# Patient Record
Sex: MALE | Race: WHITE | NOT HISPANIC OR LATINO | Employment: FULL TIME | URBAN - NONMETROPOLITAN AREA
[De-identification: names, ages, dates, MRNs, and addresses within clinical notes are randomized per-mention and may not be internally consistent; named-entity substitution may affect disease eponyms.]

---

## 2017-03-07 RX ORDER — ASPIRIN 81 MG/1
81 TABLET, CHEWABLE ORAL DAILY
COMMUNITY

## 2017-03-07 RX ORDER — INSULIN GLARGINE 100 [IU]/ML
35 INJECTION, SOLUTION SUBCUTANEOUS DAILY
COMMUNITY
End: 2017-07-24 | Stop reason: SDUPTHER

## 2017-03-07 RX ORDER — MAGNESIUM OXIDE 400 MG/1
400 TABLET ORAL 2 TIMES DAILY
COMMUNITY

## 2017-03-07 RX ORDER — ALLOPURINOL 100 MG/1
100 TABLET ORAL DAILY
Status: ON HOLD | COMMUNITY
End: 2017-03-14

## 2017-03-14 ENCOUNTER — ANESTHESIA EVENT (OUTPATIENT)
Dept: GASTROENTEROLOGY | Facility: HOSPITAL | Age: 57
End: 2017-03-14

## 2017-03-14 ENCOUNTER — ANESTHESIA (OUTPATIENT)
Dept: GASTROENTEROLOGY | Facility: HOSPITAL | Age: 57
End: 2017-03-14

## 2017-03-14 ENCOUNTER — HOSPITAL ENCOUNTER (OUTPATIENT)
Facility: HOSPITAL | Age: 57
Setting detail: HOSPITAL OUTPATIENT SURGERY
Discharge: HOME | End: 2017-03-14
Attending: INTERNAL MEDICINE | Admitting: INTERNAL MEDICINE

## 2017-03-14 VITALS
WEIGHT: 207.89 LBS | DIASTOLIC BLOOD PRESSURE: 65 MMHG | BODY MASS INDEX: 26.68 KG/M2 | OXYGEN SATURATION: 96 % | HEART RATE: 77 BPM | HEIGHT: 74 IN | RESPIRATION RATE: 20 BRPM | SYSTOLIC BLOOD PRESSURE: 105 MMHG | TEMPERATURE: 97.8 F

## 2017-03-14 DIAGNOSIS — K63.5 BENIGN COLON POLYP: ICD-10-CM

## 2017-03-14 LAB — GLUCOSE BLDC GLUCOMTR-MCNC: 193 MG/DL (ref 70–130)

## 2017-03-14 PROCEDURE — 25010000002 MIDAZOLAM PER 1 MG: Performed by: NURSE ANESTHETIST, CERTIFIED REGISTERED

## 2017-03-14 PROCEDURE — 25010000002 PROPOFOL 10 MG/ML EMULSION: Performed by: NURSE ANESTHETIST, CERTIFIED REGISTERED

## 2017-03-14 PROCEDURE — 82962 GLUCOSE BLOOD TEST: CPT

## 2017-03-14 PROCEDURE — 88305 TISSUE EXAM BY PATHOLOGIST: CPT | Performed by: PATHOLOGY

## 2017-03-14 PROCEDURE — 25010000002 FENTANYL CITRATE (PF) 100 MCG/2ML SOLUTION: Performed by: NURSE ANESTHETIST, CERTIFIED REGISTERED

## 2017-03-14 PROCEDURE — 88305 TISSUE EXAM BY PATHOLOGIST: CPT | Performed by: INTERNAL MEDICINE

## 2017-03-14 RX ORDER — MIDAZOLAM HYDROCHLORIDE 1 MG/ML
INJECTION INTRAMUSCULAR; INTRAVENOUS AS NEEDED
Status: DISCONTINUED | OUTPATIENT
Start: 2017-03-14 | End: 2017-03-14 | Stop reason: SURG

## 2017-03-14 RX ORDER — DEXAMETHASONE SODIUM PHOSPHATE 4 MG/ML
8 INJECTION, SOLUTION INTRA-ARTICULAR; INTRALESIONAL; INTRAMUSCULAR; INTRAVENOUS; SOFT TISSUE ONCE AS NEEDED
Status: DISCONTINUED | OUTPATIENT
Start: 2017-03-14 | End: 2017-03-14 | Stop reason: HOSPADM

## 2017-03-14 RX ORDER — SIMVASTATIN 10 MG
10 TABLET ORAL NIGHTLY
COMMUNITY
End: 2022-07-06 | Stop reason: SDUPTHER

## 2017-03-14 RX ORDER — FENTANYL CITRATE 50 UG/ML
INJECTION, SOLUTION INTRAMUSCULAR; INTRAVENOUS AS NEEDED
Status: DISCONTINUED | OUTPATIENT
Start: 2017-03-14 | End: 2017-03-14 | Stop reason: SURG

## 2017-03-14 RX ORDER — PROMETHAZINE HYDROCHLORIDE 25 MG/1
25 TABLET ORAL ONCE AS NEEDED
Status: DISCONTINUED | OUTPATIENT
Start: 2017-03-14 | End: 2017-03-14 | Stop reason: HOSPADM

## 2017-03-14 RX ORDER — PROPOFOL 10 MG/ML
VIAL (ML) INTRAVENOUS AS NEEDED
Status: DISCONTINUED | OUTPATIENT
Start: 2017-03-14 | End: 2017-03-14 | Stop reason: SURG

## 2017-03-14 RX ORDER — PROMETHAZINE HYDROCHLORIDE 25 MG/ML
12.5 INJECTION, SOLUTION INTRAMUSCULAR; INTRAVENOUS ONCE AS NEEDED
Status: DISCONTINUED | OUTPATIENT
Start: 2017-03-14 | End: 2017-03-14 | Stop reason: HOSPADM

## 2017-03-14 RX ORDER — ONDANSETRON 2 MG/ML
4 INJECTION INTRAMUSCULAR; INTRAVENOUS ONCE AS NEEDED
Status: DISCONTINUED | OUTPATIENT
Start: 2017-03-14 | End: 2017-03-14 | Stop reason: HOSPADM

## 2017-03-14 RX ORDER — PROMETHAZINE HYDROCHLORIDE 25 MG/1
25 SUPPOSITORY RECTAL ONCE AS NEEDED
Status: DISCONTINUED | OUTPATIENT
Start: 2017-03-14 | End: 2017-03-14 | Stop reason: HOSPADM

## 2017-03-14 RX ORDER — INSULIN GLARGINE 100 [IU]/ML
40 INJECTION, SOLUTION SUBCUTANEOUS NIGHTLY
COMMUNITY
End: 2017-07-24 | Stop reason: SDUPTHER

## 2017-03-14 RX ORDER — DEXTROSE AND SODIUM CHLORIDE 5; .45 G/100ML; G/100ML
30 INJECTION, SOLUTION INTRAVENOUS CONTINUOUS
Status: DISCONTINUED | OUTPATIENT
Start: 2017-03-14 | End: 2017-03-14 | Stop reason: HOSPADM

## 2017-03-14 RX ADMIN — PROPOFOL 50 MG: 10 INJECTION, EMULSION INTRAVENOUS at 08:55

## 2017-03-14 RX ADMIN — PROPOFOL 100 MG: 10 INJECTION, EMULSION INTRAVENOUS at 09:00

## 2017-03-14 RX ADMIN — PROPOFOL 50 MG: 10 INJECTION, EMULSION INTRAVENOUS at 09:05

## 2017-03-14 RX ADMIN — PROPOFOL 50 MG: 10 INJECTION, EMULSION INTRAVENOUS at 08:52

## 2017-03-14 RX ADMIN — MIDAZOLAM 2 MG: 1 INJECTION INTRAMUSCULAR; INTRAVENOUS at 08:52

## 2017-03-14 RX ADMIN — DEXTROSE AND SODIUM CHLORIDE 30 ML/HR: 5; 450 INJECTION, SOLUTION INTRAVENOUS at 08:40

## 2017-03-14 RX ADMIN — FENTANYL CITRATE 50 MCG: 50 INJECTION, SOLUTION INTRAMUSCULAR; INTRAVENOUS at 08:52

## 2017-03-14 NOTE — ANESTHESIA PREPROCEDURE EVALUATION
Anesthesia Evaluation     NPO Status: > 8 hours   Airway   Mallampati: II  TM distance: >3 FB  no difficulty expected  Dental    (+) edentulous    Pulmonary - normal exam   (+) a smoker Current,   Cardiovascular - normal exam  Exercise tolerance: good (4-7 METS)    (+) dysrhythmias,       Neuro/Psych- negative ROS  GI/Hepatic/Renal/Endo    (+)  diabetes mellitus type 2 poorly controlled,     Musculoskeletal     Abdominal    Substance History      OB/GYN          Other                                  Anesthesia Plan    ASA 3     MAC     Anesthetic plan and risks discussed with patient.    Plan discussed with CRNA.

## 2017-03-14 NOTE — PLAN OF CARE
Problem: Patient Care Overview (Adult)  Goal: Plan of Care Review    03/14/17 0911   Coping/Psychosocial Response Interventions   Plan Of Care Reviewed With patient   Patient Care Overview   Progress no change   Outcome Evaluation   Outcome Summary/Follow up Plan pt alert         Problem: GI Endoscopy (Adult)  Goal: Signs and Symptoms of Listed Potential Problems Will be Absent or Manageable (GI Endoscopy)    03/14/17 0911   GI Endoscopy   Problems Assessed (GI Endoscopy) all   Problems Present (GI Endoscopy) none

## 2017-03-14 NOTE — H&P
Arie Forde DO,[facility]  Gastroenterology  Hepatology  Endoscopy  Board Certified in Internal Medicine and gastroenterology  [address], [address]  [place]. [address]  - [phone]   F - [phone]     GASTROENTEROLOGY HISTORY AND PHYSICAL  NOTE   ARIE FORDE DO.         SUBJECTIVE:   3/14/2017    Name: Sandoval Navarro  DOD: 1960      Chief Complaint:       Subjective : Personal history of colon polyps 5 years ago    Patient is 56 y.o. male presents with desire for surveillance colonoscopy.  Personal history of colon polyps with last colonoscopy 5 years ago.  No subjective or objective symptoms.  No family history of colon cancer.      ROS/HISTORY/ CURRENT MEDICATIONS/OBJECTIVE/VS/PE:   Review of Systems:   Review of Systems    History:     Past Medical History   Diagnosis Date   • Diabetes mellitus    • Gout    • Hyperlipemia      Past Surgical History   Procedure Laterality Date   • Hand surgery     • Nose surgery     • Back surgery     • Cholecystectomy     • Pancreas surgery     • Splenectomy     • Leg surgery Right      History reviewed. No pertinent family history.  Social History   Substance Use Topics   • Smoking status: Current Every Day Smoker     Packs/day: 1.00     Types: Cigarettes   • Smokeless tobacco: None   • Alcohol use Yes     Prescriptions Prior to Admission   Medication Sig Dispense Refill Last Dose   • aspirin 81 MG chewable tablet Chew 81 mg Daily.   3/11/2017   • diltiaZEM (CARDIZEM) 60 MG tablet Take  by mouth 2 (Two) Times a Day. Patient will bring    3/13/2017 at Unknown time   • gabapentin (NEURONTIN) 100 MG capsule Take 100 mg by mouth Daily.   3/13/2017 at Unknown time   • insulin glargine (LANTUS) 100 UNIT/ML injection Inject 35 Units under the skin Daily.   3/13/2017 at Unknown time   • insulin glargine (LANTUS) 100 UNIT/ML injection Inject 40 Units under the skin Every Night.   3/13/2017 at Unknown time   • insulin lispro (humaLOG) 100 UNIT/ML  injection Inject  under the skin 3 (Three) Times a Day Before Meals. Sliding scale   3/13/2017 at Unknown time   • magnesium oxide (MAG-OX) 400 MG tablet Take 400 mg by mouth 2 (Two) Times a Day.   3/13/2017 at Unknown time   • simvastatin (ZOCOR) 10 MG tablet Take 10 mg by mouth Every Night.   3/13/2017 at Unknown time     Allergies:  Review of patient's allergies indicates no known allergies.    I have reviewed the patients medical history, surgical history and family history in the available medical record system.     Current Medications:     Current Facility-Administered Medications   Medication Dose Route Frequency Provider Last Rate Last Dose   • dextrose 5 % and sodium chloride 0.45 % infusion  30 mL/hr Intravenous Continuous Cachorro Perez,  30 mL/hr at 03/14/17 0840 30 mL/hr at 03/14/17 0840       Objective     Physical Exam:   Temp:  [98.6 °F (37 °C)] 98.6 °F (37 °C)  Heart Rate:  [80] 80  Resp:  [18] 18  BP: (127)/(76) 127/76    Physical Exam:  General Appearance:    Alert, cooperative, in no acute distress   Head:    Normocephalic, without obvious abnormality, atraumatic   Eyes:            Lids and lashes normal, conjunctivae and sclerae normal, no   icterus, no pallor, corneas clear, PERRLA   Ears:    Ears appear intact with no abnormalities noted   Throat:   No oral lesions, no thrush, oral mucosa moist   Neck:   No adenopathy, supple, trachea midline, no thyromegaly, no     carotid bruit, no JVD   Back:     No kyphosis present, no scoliosis present, no skin lesions,       erythema or scars, no tenderness to percussion or                   palpation,   range of motion normal   Lungs:     Clear to auscultation,respirations regular, even and                   unlabored    Heart:    Regular rhythm and normal rate, normal S1 and S2, no            murmur, no gallop, no rub, no click   Breast Exam:    Deferred   Abdomen:     Normal bowel sounds, no masses, no organomegaly, soft        non-tender,  non-distended, no guarding, no rebound                 tenderness   Genitalia:    Deferred   Extremities:   Moves all extremities well, no edema, no cyanosis, no              redness   Pulses:   Pulses palpable and equal bilaterally   Skin:   No bleeding, bruising or rash   Lymph nodes:   No palpable adenopathy   Neurologic:   Cranial nerves 2 - 12 grossly intact, sensation intact, DTR        present and equal bilaterally      Results Review:     Lab Results   Component Value Date    WBC 10.4 (H) 07/02/2016    WBC 13.6 (H) 07/01/2016    WBC 15.5 (H) 09/19/2014    HGB 13.6 (L) 07/02/2016    HGB 14.5 07/01/2016    HGB 14.0 09/19/2014    HCT 39.4 07/02/2016    HCT 41.7 07/01/2016    HCT 41.2 09/19/2014     07/02/2016     07/01/2016     09/19/2014             No results found for: LIPASE  Lab Results   Component Value Date    INR 0.9 07/02/2016    INR 1.0 09/19/2014          Radiology Review:  Imaging Results (last 72 hours)     ** No results found for the last 72 hours. **           I reviewed the patient's new clinical results.  I reviewed the patient's new imaging results and agree with the interpretation.     ASSESSMENT/PLAN:   ASSESSMENT:   1.  Personal history of colon polyps    PLAN:   1.  Colonoscopy, surveillance    Risk and benefits associated with the procedure are reviewed with the patient.  He wishes to proceed      Cachorro Perez DO  03/14/17  8:47 AM

## 2017-03-14 NOTE — PLAN OF CARE
Problem: Patient Care Overview (Adult)  Goal: Plan of Care Review  Outcome: Outcome(s) achieved Date Met:  03/14/17 03/14/17 0941   Coping/Psychosocial Response Interventions   Plan Of Care Reviewed With patient   Patient Care Overview   Progress no change   Outcome Evaluation   Outcome Summary/Follow up Plan ready for d/c         Problem: GI Endoscopy (Adult)  Goal: Signs and Symptoms of Listed Potential Problems Will be Absent or Manageable (GI Endoscopy)  Outcome: Outcome(s) achieved Date Met:  03/14/17 03/14/17 0941   GI Endoscopy   Problems Assessed (GI Endoscopy) all   Problems Present (GI Endoscopy) none

## 2017-03-14 NOTE — ANESTHESIA POSTPROCEDURE EVALUATION
Patient: Sandoval Navarro    Procedure Summary     Date Anesthesia Start Anesthesia Stop Room / Location    03/14/17 0851 0910 [facility] ENDOSCOPY 2 / [facility] ENDOSCOPY       Procedure Diagnosis Surgeon Provider    COLONOSCOPY (N/A ) Benign colon polyp  (BENIGN COLON POLYPS) DO Chelsey Cox CRNA          Anesthesia Type: MAC  Last vitals  /76 (03/14/17 0823)    Temp 98.6 °F (37 °C) (03/14/17 0823)    Pulse 80 (03/14/17 0823)   Resp 18 (03/14/17 0823)    SpO2 98 % (03/14/17 0823)      Post Anesthesia Care and Evaluation    Patient location during evaluation: bedside  Patient participation: complete - patient participated  Level of consciousness: awake  Pain score: 0  Pain management: adequate  Airway patency: patent  Anesthetic complications: No anesthetic complications  PONV Status: none  Cardiovascular status: acceptable and stable  Respiratory status: acceptable and spontaneous ventilation  Hydration status: acceptable

## 2017-03-15 LAB
LAB AP CASE REPORT: NORMAL
Lab: NORMAL
PATH REPORT.FINAL DX SPEC: NORMAL
PATH REPORT.GROSS SPEC: NORMAL

## 2017-04-24 ENCOUNTER — LAB (OUTPATIENT)
Dept: ONCOLOGY | Facility: HOSPITAL | Age: 57
End: 2017-04-24

## 2017-04-24 ENCOUNTER — CONSULT (OUTPATIENT)
Dept: ONCOLOGY | Facility: CLINIC | Age: 57
End: 2017-04-24

## 2017-04-24 VITALS
TEMPERATURE: 98.9 F | HEIGHT: 74 IN | WEIGHT: 211.9 LBS | HEART RATE: 71 BPM | SYSTOLIC BLOOD PRESSURE: 132 MMHG | BODY MASS INDEX: 27.2 KG/M2 | DIASTOLIC BLOOD PRESSURE: 74 MMHG

## 2017-04-24 DIAGNOSIS — D72.829 LEUKOCYTOSIS, UNSPECIFIED TYPE: Primary | ICD-10-CM

## 2017-04-24 DIAGNOSIS — D72.829 LEUKOCYTOSIS, UNSPECIFIED TYPE: ICD-10-CM

## 2017-04-24 LAB
ALBUMIN SERPL-MCNC: 5 G/DL (ref 3.4–4.8)
ALBUMIN/GLOB SERPL: 1.5 G/DL (ref 1.1–1.8)
ALP SERPL-CCNC: 95 U/L (ref 38–126)
ALT SERPL W P-5'-P-CCNC: 35 U/L (ref 21–72)
ANION GAP SERPL CALCULATED.3IONS-SCNC: 13 MMOL/L (ref 5–15)
AST SERPL-CCNC: 28 U/L (ref 17–59)
BASOPHILS # BLD AUTO: 0.04 10*3/MM3 (ref 0–0.2)
BASOPHILS NFR BLD AUTO: 0.4 % (ref 0–2)
BILIRUB SERPL-MCNC: 0.5 MG/DL (ref 0.2–1.3)
BUN BLD-MCNC: 12 MG/DL (ref 7–21)
BUN/CREAT SERPL: 15.2 (ref 7–25)
CALCIUM SPEC-SCNC: 9.8 MG/DL (ref 8.4–10.2)
CHLORIDE SERPL-SCNC: 98 MMOL/L (ref 95–110)
CO2 SERPL-SCNC: 27 MMOL/L (ref 22–31)
CREAT BLD-MCNC: 0.79 MG/DL (ref 0.7–1.3)
DEPRECATED RDW RBC AUTO: 41.1 FL (ref 35.1–43.9)
EOSINOPHIL # BLD AUTO: 0.23 10*3/MM3 (ref 0–0.7)
EOSINOPHIL NFR BLD AUTO: 2.2 % (ref 0–7)
ERYTHROCYTE [DISTWIDTH] IN BLOOD BY AUTOMATED COUNT: 13 % (ref 11.5–14.5)
GFR SERPL CREATININE-BSD FRML MDRD: 101 ML/MIN/1.73 (ref 56–130)
GLOBULIN UR ELPH-MCNC: 3.4 GM/DL (ref 2.3–3.5)
GLUCOSE BLD-MCNC: 178 MG/DL (ref 60–100)
HCT VFR BLD AUTO: 43.7 % (ref 39–49)
HGB BLD-MCNC: 14.7 G/DL (ref 13.7–17.3)
IMM GRANULOCYTES # BLD: 0.02 10*3/MM3 (ref 0–0.02)
IMM GRANULOCYTES NFR BLD: 0.2 % (ref 0–0.5)
LDH SERPL-CCNC: 534 U/L (ref 313–618)
LYMPHOCYTES # BLD AUTO: 4.48 10*3/MM3 (ref 0.6–4.2)
LYMPHOCYTES NFR BLD AUTO: 43.8 % (ref 10–50)
MCH RBC QN AUTO: 28.9 PG (ref 26.5–34)
MCHC RBC AUTO-ENTMCNC: 33.6 G/DL (ref 31.5–36.3)
MCV RBC AUTO: 85.9 FL (ref 80–98)
MONOCYTES # BLD AUTO: 0.65 10*3/MM3 (ref 0–0.9)
MONOCYTES NFR BLD AUTO: 6.3 % (ref 0–12)
NEUTROPHILS # BLD AUTO: 4.82 10*3/MM3 (ref 2–8.6)
NEUTROPHILS NFR BLD AUTO: 47.1 % (ref 37–80)
PLATELET # BLD AUTO: 258 10*3/MM3 (ref 150–450)
PMV BLD AUTO: 10.4 FL (ref 8–12)
POTASSIUM BLD-SCNC: 4.2 MMOL/L (ref 3.5–5.1)
PROT SERPL-MCNC: 8.4 G/DL (ref 6.3–8.6)
RBC # BLD AUTO: 5.09 10*6/MM3 (ref 4.37–5.74)
SODIUM BLD-SCNC: 138 MMOL/L (ref 137–145)
WBC NRBC COR # BLD: 10.24 10*3/MM3 (ref 3.2–9.8)

## 2017-04-24 PROCEDURE — 99203 OFFICE O/P NEW LOW 30 MIN: CPT | Performed by: INTERNAL MEDICINE

## 2017-04-24 PROCEDURE — 36415 COLL VENOUS BLD VENIPUNCTURE: CPT | Performed by: INTERNAL MEDICINE

## 2017-04-24 PROCEDURE — G0463 HOSPITAL OUTPT CLINIC VISIT: HCPCS | Performed by: INTERNAL MEDICINE

## 2017-04-24 PROCEDURE — 83615 LACTATE (LD) (LDH) ENZYME: CPT | Performed by: INTERNAL MEDICINE

## 2017-04-24 PROCEDURE — 80053 COMPREHEN METABOLIC PANEL: CPT | Performed by: INTERNAL MEDICINE

## 2017-04-24 PROCEDURE — 99406 BEHAV CHNG SMOKING 3-10 MIN: CPT | Performed by: INTERNAL MEDICINE

## 2017-04-24 PROCEDURE — 85025 COMPLETE CBC W/AUTO DIFF WBC: CPT | Performed by: INTERNAL MEDICINE

## 2017-04-24 NOTE — PROGRESS NOTES
DATE OF CONSULT: 4/24/2017    REQUESTING SOURCE:  Guanako Atkins MD      REASON FOR CONSULTATION:  Leukocytosis       HISTORY OF PRESENT ILLNESS:   56-year-old male with a past medical history significant for dyslipidemia, gout, type 2 diabetes mellitus history of splenectomy is being referred to [facility] for evaluation of leukocytosis.  Patient states he has known about elevated white blood cell count for last 1 year.  Denies any fevers or chills.  Complains of chronic night sweats.  Denies any recent weight loss.  Denies any abnormal swollen and anywhere in the body.    PAST MEDICAL HISTORY:    Past Medical History:   Diagnosis Date   • Diabetes mellitus    • Gout    • Hyperlipemia        PAST SURGICAL HISTORY:  Past Surgical History:   Procedure Laterality Date   • BACK SURGERY     • CHOLECYSTECTOMY     • COLONOSCOPY N/A 3/14/2017    Procedure: COLONOSCOPY;  Surgeon: Cachorro Perez DO;  Location: [facility] ENDOSCOPY;  Service:    • HAND SURGERY     • LEG SURGERY Right    • NOSE SURGERY     • PANCREAS SURGERY     • SPLENECTOMY         ALLERGIES:    No Known Allergies    SOCIAL HISTORY:   Social History   Substance Use Topics   • Smoking status: Current Every Day Smoker     Packs/day: 1.00     Types: Cigarettes   • Smokeless tobacco: None   • Alcohol use Yes       CURRENT MEDICATIONS:    Current Outpatient Prescriptions   Medication Sig Dispense Refill   • aspirin 81 MG chewable tablet Chew 81 mg Daily.     • diltiaZEM (CARDIZEM) 60 MG tablet Take  by mouth 2 (Two) Times a Day. Patient will bring      • gabapentin (NEURONTIN) 100 MG capsule Take 100 mg by mouth Daily.     • insulin glargine (LANTUS) 100 UNIT/ML injection Inject 35 Units under the skin Daily.     • insulin glargine (LANTUS) 100 UNIT/ML injection Inject 40 Units under the skin Every Night.     • insulin lispro (humaLOG) 100 UNIT/ML injection Inject  under the skin 3 (Three) Times a Day Before Meals. Sliding scale     • magnesium oxide  (MAG-OX) 400 MG tablet Take 400 mg by mouth 2 (Two) Times a Day.     • simvastatin (ZOCOR) 10 MG tablet Take 10 mg by mouth Every Night.       No current facility-administered medications for this visit.             FAMILY HISTORY:    Patient states his mother and father  in their 80s, denies any family history of cancer or blood disorder or blood clot.          REVIEW OF SYSTEMS:      CONSTITUTIONAL:  Positive for chronic night sweats.  Denies any fever, chills or weight loss.     HEENT:  No epistaxis, mouth sores or difficulty swallowing.    RESPIRATORY:  No new shortness of breath. No new cough or hemoptysis.    CARDIOVASCULAR:  No chest pain or palpitations.    GASTROINTESTINAL:  No abdominal pain nausea, vomiting or blood in the stool.    GENITOURINARY: No Dysuria or Hematuria.    MUSCULOSKELETAL:  Complains of chronic back pain.    LYMPHATICS:  Denies any abnormal swollen glands anywhere in the body.    NEUROLOGICAL : No tingling or numbness. No headache or dizziness. No seizures or balance problems.    SKIN: No new skin lesions.          PHYSICAL EXAMINATION:      VITAL SIGNS:  Temp:  [98.9 °F (37.2 °C)] 98.9 °F (37.2 °C)  Heart Rate:  [71] 71  BP: (132)/(74) 132/74    GENERAL:  Not in any distress.    HEENT:  Normocephalic, Atraumatic.Eyes  Shows mild pallor. No icterus. Extraocular Movements Intact. No Facial Asymmetry noted.    NECK:  No adenopathy. NO JVD.    RESPIRATORY:  Fair air entry bilateral. No rhonchi or wheezing.    CARDIOVASCULAR:  S1, S2. Regular rate and rhythm. No murmur or gallop appreciated.    ABDOMEN:  Soft,  nontender. Bowel sounds present in all four quadrants.  No organomegaly appreciated.  Well-healed surgical scar present.    EXTREMITIES:  No edema.No Calf Tenderness.    NEUROLOGIC:  Alert, awake and oriented ×3.  No  Motor or sensory deficit appreciated. Cranial Nerves 2-12 grossly intact.        DIAGNOSTIC DATA:    WBC   Date Value Ref Range Status   2016 10.4 (H) 3.2 -  9.8 x1000/uL Final     RBC   Date Value Ref Range Status   07/02/2016 4.66 4.37 - 5.74 chace/mm3 Final     Hemoglobin   Date Value Ref Range Status   07/02/2016 13.6 (L) 13.7 - 17.3 gm/dl Final     Hematocrit   Date Value Ref Range Status   07/02/2016 39.4 39.0 - 49.0 % Final     MCV   Date Value Ref Range Status   07/02/2016 84.5 80.0 - 98.0 fl Final     MCH   Date Value Ref Range Status   07/02/2016 29.2 26.0 - 34.0 pg Final     MCHC   Date Value Ref Range Status   07/02/2016 34.5 31.5 - 36.3 gm/dl Final     RDW   Date Value Ref Range Status   07/02/2016 12.8 11.5 - 14.5 % Final     MPV   Date Value Ref Range Status   07/02/2016 11.4 8.0 - 12.0 fl Final     Platelets   Date Value Ref Range Status   07/02/2016 236 150 - 450 x1000/mm3 Final     Neutrophil Rel %   Date Value Ref Range Status   07/02/2016 44.1 37.0 - 80.0 % Final     Lymphocyte Rel %   Date Value Ref Range Status   07/02/2016 43.1 10.0 - 50.0 % Final     Monocyte Rel %   Date Value Ref Range Status   07/02/2016 8.5 0.0 - 12.0 % Final     Eosinophil Rel %   Date Value Ref Range Status   07/02/2016 3.0 0.0 - 7.0 % Final     Basophil Rel %   Date Value Ref Range Status   07/02/2016 0.9 0.0 - 2.0 % Final     Immature Granulocyte Rel %   Date Value Ref Range Status   07/02/2016 0.40 0.00 - 0.50 % Final     Neutrophils Absolute   Date Value Ref Range Status   07/02/2016 4.60 2.00 - 8.60 x1000/uL Final     Lymphocytes Absolute   Date Value Ref Range Status   07/02/2016 4.49 (H) 0.60 - 4.20 x1000/uL Final     Monocytes Absolute   Date Value Ref Range Status   07/02/2016 0.89 0.00 - 0.90 x1000/uL Final     Eosinophils Absolute   Date Value Ref Range Status   07/02/2016 0.31 0.00 - 0.70 x1000/uL Final     Basophils Absolute   Date Value Ref Range Status   07/02/2016 0.09 0.00 - 0.20 x1000/uL Final     Immature Granulocytes Absolute   Date Value Ref Range Status   07/02/2016 0.040 (H) 0.005 - 0.022 x1000/uL Final     Glucose   Date Value Ref Range Status    07/02/2016 260 (H) 60 - 100 mg/dl Final     Sodium   Date Value Ref Range Status   07/02/2016 137 137 - 145 mmol/L Final     Potassium   Date Value Ref Range Status   07/02/2016 4.1 3.5 - 5.1 mmol/L Final     CO2   Date Value Ref Range Status   07/02/2016 25 22 - 31 mmol/L Final     Chloride   Date Value Ref Range Status   07/02/2016 100 95 - 110 mmol/L Final     Anion Gap   Date Value Ref Range Status   07/02/2016 12.0 5.0 - 15.0 mmol/L Final     Creatinine   Date Value Ref Range Status   07/02/2016 0.7 0.7 - 1.3 mg/dl Final     BUN   Date Value Ref Range Status   07/02/2016 16 7 - 21 mg/dl Final     Calcium   Date Value Ref Range Status   07/02/2016 8.8 8.4 - 10.2 mg/dl Final     Alkaline Phosphatase   Date Value Ref Range Status   07/02/2016 61 38 - 126 U/L Final     Total Protein   Date Value Ref Range Status   07/02/2016 6.3 6.3 - 8.6 gm/dl Final     ALT (SGPT)   Date Value Ref Range Status   07/02/2016 28 21 - 72 U/L Final     AST (SGOT)   Date Value Ref Range Status   07/02/2016 15 (L) 17 - 59 U/L Final     Total Bilirubin   Date Value Ref Range Status   07/02/2016 0.6 0.2 - 1.3 mg/dl Final     Albumin   Date Value Ref Range Status   07/02/2016 3.5 3.4 - 4.8 gm/dl Final         ASSESSMENT AND PLAN:      1.  Chronic leukocytosis: Differential diagnosis at this point includes secondary to splenectomy versus reactive causes like inflammation.  Unlikely bone marrow pathology like myeloproliferative neoplasm or lymphoma.  At this point we will monitored with CBC.  We will do CBC with differential along with CMP and LDH today.  If CBC shows very elevated white blood cell count then we will send out peripheral blood flow cytometry as well as JAK2 mutation which was discussed with patient.  Patient does have a chronic nicotine addiction and he smokes about pack per day for last 42 years.  It was explained to patient smoking can also cause elevation in white blood cell count.  He was counseled about the importance  of smoking cessation today.    2.  Diabetes mellitus    3.  Dyslipidemia    4.  Health maintenance: Patient unfortunately continues to smoke about pack per day, he was counseled about smoking cessation.  About 3 minutes were spent for smoking cessation counseling today.  He had a colonoscopy done recently which shows tubular adenoma.  He remains full code.      Thank you for this consultation.        Marcio Ortiz MD  4/24/2017  1:56 PM          EMR Dragon/Transcription disclaimer:   Much of this encounter note is an electronic transcription/translation of spoken language to printed text. The electronic translation of spoken language may permit erroneous, or at times, nonsensical words or phrases to be inadvertently transcribed; Although I have reviewed the note for such errors, some may still exist.

## 2017-07-24 ENCOUNTER — LAB (OUTPATIENT)
Dept: ONCOLOGY | Facility: HOSPITAL | Age: 57
End: 2017-07-24

## 2017-07-24 ENCOUNTER — OFFICE VISIT (OUTPATIENT)
Dept: ONCOLOGY | Facility: CLINIC | Age: 57
End: 2017-07-24

## 2017-07-24 VITALS
BODY MASS INDEX: 28 KG/M2 | SYSTOLIC BLOOD PRESSURE: 137 MMHG | WEIGHT: 218.1 LBS | TEMPERATURE: 98.1 F | DIASTOLIC BLOOD PRESSURE: 81 MMHG | HEART RATE: 97 BPM

## 2017-07-24 DIAGNOSIS — D72.829 LEUKOCYTOSIS, UNSPECIFIED TYPE: Primary | ICD-10-CM

## 2017-07-24 DIAGNOSIS — D72.829 LEUKOCYTOSIS, UNSPECIFIED TYPE: ICD-10-CM

## 2017-07-24 LAB
ALBUMIN SERPL-MCNC: 4.5 G/DL (ref 3.4–4.8)
ALBUMIN/GLOB SERPL: 1.3 G/DL (ref 1.1–1.8)
ALP SERPL-CCNC: 90 U/L (ref 38–126)
ALT SERPL W P-5'-P-CCNC: 38 U/L (ref 21–72)
ANION GAP SERPL CALCULATED.3IONS-SCNC: 11 MMOL/L (ref 5–15)
AST SERPL-CCNC: 24 U/L (ref 17–59)
BASOPHILS # BLD AUTO: 0.04 10*3/MM3 (ref 0–0.2)
BASOPHILS NFR BLD AUTO: 0.3 % (ref 0–2)
BILIRUB SERPL-MCNC: 0.5 MG/DL (ref 0.2–1.3)
BUN BLD-MCNC: 19 MG/DL (ref 7–21)
BUN/CREAT SERPL: 20.4 (ref 7–25)
CALCIUM SPEC-SCNC: 9.4 MG/DL (ref 8.4–10.2)
CHLORIDE SERPL-SCNC: 100 MMOL/L (ref 95–110)
CO2 SERPL-SCNC: 24 MMOL/L (ref 22–31)
CREAT BLD-MCNC: 0.93 MG/DL (ref 0.7–1.3)
DEPRECATED RDW RBC AUTO: 39.7 FL (ref 35.1–43.9)
EOSINOPHIL # BLD AUTO: 0.17 10*3/MM3 (ref 0–0.7)
EOSINOPHIL NFR BLD AUTO: 1.2 % (ref 0–7)
ERYTHROCYTE [DISTWIDTH] IN BLOOD BY AUTOMATED COUNT: 13 % (ref 11.5–14.5)
GFR SERPL CREATININE-BSD FRML MDRD: 83 ML/MIN/1.73 (ref 60–130)
GLOBULIN UR ELPH-MCNC: 3.5 GM/DL (ref 2.3–3.5)
GLUCOSE BLD-MCNC: 96 MG/DL (ref 60–100)
HCT VFR BLD AUTO: 44 % (ref 39–49)
HGB BLD-MCNC: 15.2 G/DL (ref 13.7–17.3)
IMM GRANULOCYTES # BLD: 0.03 10*3/MM3 (ref 0–0.02)
IMM GRANULOCYTES NFR BLD: 0.2 % (ref 0–0.5)
LDH SERPL-CCNC: 478 U/L (ref 313–618)
LYMPHOCYTES # BLD AUTO: 4.16 10*3/MM3 (ref 0.6–4.2)
LYMPHOCYTES NFR BLD AUTO: 28.7 % (ref 10–50)
MCH RBC QN AUTO: 29.3 PG (ref 26.5–34)
MCHC RBC AUTO-ENTMCNC: 34.5 G/DL (ref 31.5–36.3)
MCV RBC AUTO: 84.9 FL (ref 80–98)
MONOCYTES # BLD AUTO: 1.08 10*3/MM3 (ref 0–0.9)
MONOCYTES NFR BLD AUTO: 7.4 % (ref 0–12)
NEUTROPHILS # BLD AUTO: 9.04 10*3/MM3 (ref 2–8.6)
NEUTROPHILS NFR BLD AUTO: 62.2 % (ref 37–80)
PLATELET # BLD AUTO: 240 10*3/MM3 (ref 150–450)
PMV BLD AUTO: 10.9 FL (ref 8–12)
POTASSIUM BLD-SCNC: 4 MMOL/L (ref 3.5–5.1)
PROT SERPL-MCNC: 8 G/DL (ref 6.3–8.6)
RBC # BLD AUTO: 5.18 10*6/MM3 (ref 4.37–5.74)
SODIUM BLD-SCNC: 135 MMOL/L (ref 137–145)
WBC NRBC COR # BLD: 14.52 10*3/MM3 (ref 3.2–9.8)

## 2017-07-24 PROCEDURE — 80053 COMPREHEN METABOLIC PANEL: CPT

## 2017-07-24 PROCEDURE — G0463 HOSPITAL OUTPT CLINIC VISIT: HCPCS | Performed by: INTERNAL MEDICINE

## 2017-07-24 PROCEDURE — 85025 COMPLETE CBC W/AUTO DIFF WBC: CPT

## 2017-07-24 PROCEDURE — 83615 LACTATE (LD) (LDH) ENZYME: CPT

## 2017-07-24 PROCEDURE — 99214 OFFICE O/P EST MOD 30 MIN: CPT | Performed by: INTERNAL MEDICINE

## 2017-07-24 RX ORDER — INSULIN GLARGINE 100 [IU]/ML
53 INJECTION, SOLUTION SUBCUTANEOUS DAILY
Refills: 5 | Status: ON HOLD | COMMUNITY
Start: 2017-06-14 | End: 2023-04-05 | Stop reason: SDUPTHER

## 2017-07-24 RX ORDER — NICOTINE 21 MG/24HR
1 PATCH, TRANSDERMAL 24 HOURS TRANSDERMAL EVERY 24 HOURS
Qty: 15 PATCH | Refills: 2 | Status: SHIPPED | OUTPATIENT
Start: 2017-07-24 | End: 2022-07-06

## 2017-07-24 NOTE — PROGRESS NOTES
DATE OF VISIT: 7/24/2017    REASON FOR VISIT: leukocytosis    HISTORY OF PRESENT ILLNESS:    56-year-old male with a past medical history significant for dyslipidemia, gout, type 2 diabetes mellitus history of splenectomy was seen initially in consultation on April 24, 2017 is here for follow-up visit today.  Denies any abnormal swollen and anywhere in the body.  Denies any fever or chills or night sweats.  Denies any blood in the stool or urine.      PAST MEDICAL HISTORY:    Past Medical History:   Diagnosis Date   • Diabetes mellitus    • Gout    • Hyperlipemia        SOCIAL HISTORY:    Social History   Substance Use Topics   • Smoking status: Current Every Day Smoker     Packs/day: 1.00     Types: Cigarettes   • Smokeless tobacco: None   • Alcohol use Yes       Surgical History :  Past Surgical History:   Procedure Laterality Date   • BACK SURGERY     • CHOLECYSTECTOMY     • COLONOSCOPY N/A 3/14/2017    Procedure: COLONOSCOPY;  Surgeon: Cachorro Perez DO;  Location: [facility] ENDOSCOPY;  Service:    • HAND SURGERY     • LEG SURGERY Right    • NOSE SURGERY     • PANCREAS SURGERY     • SPLENECTOMY         ALLERGIES:    No Known Allergies    REVIEW OF SYSTEMS:      CONSTITUTIONAL:  No fever, chills, or night sweats.     HEENT:  No epistaxis, mouth sores, or difficulty swallowing.    RESPIRATORY:  No new shortness of breath or cough at present.    CARDIOVASCULAR:  No chest pain or palpitations.    GASTROINTESTINAL:  No abdominal pain, nausea, vomiting, or blood in the stool.    GENITOURINARY:  No dysuria or hematuria.    MUSCULOSKELETAL:  positive for chronic back pain. Complains of nodularity on left forearm.    NEUROLOGICAL:  No tingling or numbness. No new headache or dizziness.     LYMPHATICS:  Denies any abnormal swollen and anywhere in the body.    SKIN:  Denies any new skin rash.        PHYSICAL EXAMINATION:      VITAL SIGNS:  /81  Pulse 97  Temp 98.1 °F (36.7 °C)  Wt 218 lb 1.6 oz (98.9 kg)  BMI 28  kg/m2    GENERAL:  Not in any distress.    HEENT:  Normocephalic, Atraumatic.Mild Conjunctival pallor. No icterus. Extraocular Movements Intact. No Facial Asymmetry noted.    NECK:  No adenopathy. No JVD.    RESPIRATORY:  Fair air entry bilateral. No rhonchi or wheezing.    CARDIOVASCULAR:  S1, S2. Regular rate and rhythm. No murmur or gallop appreciated.    ABDOMEN:  Soft, obese, nontender. Bowel sounds present in all four quadrants.  No organomegaly appreciated.    EXTREMITIES:  No edema.No Calf Tenderness.positive for nodularity on left forearm.    NEUROLOGIC:  Alert, awake and oriented ×3.  No  Motor or sensory deficit appreciated. Cranial Nerves 2-12 grossly intact.        DIAGNOSTIC DATA:    Glucose   Date Value Ref Range Status   07/24/2017 96 60 - 100 mg/dL Final     Sodium   Date Value Ref Range Status   07/24/2017 135 (L) 137 - 145 mmol/L Final     Potassium   Date Value Ref Range Status   07/24/2017 4.0 3.5 - 5.1 mmol/L Final     CO2   Date Value Ref Range Status   07/24/2017 24.0 22.0 - 31.0 mmol/L Final     Chloride   Date Value Ref Range Status   07/24/2017 100 95 - 110 mmol/L Final     Anion Gap   Date Value Ref Range Status   07/24/2017 11.0 5.0 - 15.0 mmol/L Final     Creatinine   Date Value Ref Range Status   07/24/2017 0.93 0.70 - 1.30 mg/dL Final     BUN   Date Value Ref Range Status   07/24/2017 19 7 - 21 mg/dL Final     BUN/Creatinine Ratio   Date Value Ref Range Status   07/24/2017 20.4 7.0 - 25.0 Final     Calcium   Date Value Ref Range Status   07/24/2017 9.4 8.4 - 10.2 mg/dL Final     eGFR Non  Amer   Date Value Ref Range Status   07/24/2017 83 >60 mL/min/1.73 Final     Alkaline Phosphatase   Date Value Ref Range Status   07/24/2017 90 38 - 126 U/L Final     Total Protein   Date Value Ref Range Status   07/24/2017 8.0 6.3 - 8.6 g/dL Final     ALT (SGPT)   Date Value Ref Range Status   07/24/2017 38 21 - 72 U/L Final     AST (SGOT)   Date Value Ref Range Status   07/24/2017 24 17 - 59  U/L Final     Total Bilirubin   Date Value Ref Range Status   07/24/2017 0.5 0.2 - 1.3 mg/dL Final     Albumin   Date Value Ref Range Status   07/24/2017 4.50 3.40 - 4.80 g/dL Final     Globulin   Date Value Ref Range Status   07/24/2017 3.5 2.3 - 3.5 gm/dL Final     A/G Ratio   Date Value Ref Range Status   07/24/2017 1.3 1.1 - 1.8 g/dL Final     Lab Results   Component Value Date    WBC 14.52 (H) 07/24/2017    HGB 15.2 07/24/2017    HCT 44.0 07/24/2017    MCV 84.9 07/24/2017     07/24/2017     Lab Results   Component Value Date    NEUTROABS 9.04 (H) 07/24/2017         ASSESSMENT AND PLAN:      1.  Chronic leukocytosis: Differential diagnosis at this point includes secondary to splenectomy versus reactive causes like inflammation.  Unlikely bone marrow pathology like myeloproliferative neoplasm or lymphoma.  At this point we will monitored with CBC.  Patient does have a chronic nicotine addiction and he smokes about pack per day for last 42 years.  It was explained to patient smoking can also cause elevation in white blood cell count.  since his white blood cell count is elevated since last time we get a JAK2 mutation as well as peripheral blood flow cytometry done prior to next clinic visit in 3 months.     2.  Diabetes mellitus     3.  Dyslipidemia     4.  Health maintenance: Patient unfortunately continues to smoke about pack per day, he was counseled about smoking cessation.  About 3 minutes were spent for smoking cessation counseling today. patient is requesting a prescription for nicotine patch.  Prescription has been sent to his pharmacy today. He had a colonoscopy done in 03/17 which shows tubular adenoma.  He remains full code.     Marcio Ortiz MD  7/24/2017  3:04 PM        EMR Dragon/Transcription disclaimer:   Much of this encounter note is an electronic transcription/translation of spoken language to printed text. The electronic translation of spoken language may permit erroneous, or at times,  nonsensical words or phrases to be inadvertently transcribed; Although I have reviewed the note for such errors, some may still exist.

## 2017-10-16 ENCOUNTER — LAB (OUTPATIENT)
Dept: ONCOLOGY | Facility: HOSPITAL | Age: 57
End: 2017-10-16

## 2017-10-16 DIAGNOSIS — D72.829 LEUKOCYTOSIS, UNSPECIFIED TYPE: ICD-10-CM

## 2017-10-16 LAB
ALBUMIN SERPL-MCNC: 4.1 G/DL (ref 3.4–4.8)
ALBUMIN/GLOB SERPL: 1.5 G/DL (ref 1.1–1.8)
ALP SERPL-CCNC: 62 U/L (ref 38–126)
ALT SERPL W P-5'-P-CCNC: 33 U/L (ref 21–72)
ANION GAP SERPL CALCULATED.3IONS-SCNC: 9 MMOL/L (ref 5–15)
AST SERPL-CCNC: 20 U/L (ref 17–59)
BASOPHILS # BLD AUTO: 0.06 10*3/MM3 (ref 0–0.2)
BASOPHILS NFR BLD AUTO: 0.5 % (ref 0–2)
BILIRUB SERPL-MCNC: 0.5 MG/DL (ref 0.2–1.3)
BUN BLD-MCNC: 15 MG/DL (ref 7–21)
BUN/CREAT SERPL: 18.8 (ref 7–25)
CALCIUM SPEC-SCNC: 9.1 MG/DL (ref 8.4–10.2)
CHLORIDE SERPL-SCNC: 100 MMOL/L (ref 95–110)
CO2 SERPL-SCNC: 27 MMOL/L (ref 22–31)
CREAT BLD-MCNC: 0.8 MG/DL (ref 0.7–1.3)
DEPRECATED RDW RBC AUTO: 44 FL (ref 35.1–43.9)
EOSINOPHIL # BLD AUTO: 0.23 10*3/MM3 (ref 0–0.7)
EOSINOPHIL NFR BLD AUTO: 1.8 % (ref 0–7)
ERYTHROCYTE [DISTWIDTH] IN BLOOD BY AUTOMATED COUNT: 13.9 % (ref 11.5–14.5)
GFR SERPL CREATININE-BSD FRML MDRD: 99 ML/MIN/1.73 (ref 56–130)
GLOBULIN UR ELPH-MCNC: 2.7 GM/DL (ref 2.3–3.5)
GLUCOSE BLD-MCNC: 243 MG/DL (ref 60–100)
HCT VFR BLD AUTO: 41.1 % (ref 39–49)
HGB BLD-MCNC: 13.5 G/DL (ref 13.7–17.3)
IMM GRANULOCYTES # BLD: 0.02 10*3/MM3 (ref 0–0.02)
IMM GRANULOCYTES NFR BLD: 0.2 % (ref 0–0.5)
LYMPHOCYTES # BLD AUTO: 4.43 10*3/MM3 (ref 0.6–4.2)
LYMPHOCYTES NFR BLD AUTO: 34.9 % (ref 10–50)
MCH RBC QN AUTO: 28.5 PG (ref 26.5–34)
MCHC RBC AUTO-ENTMCNC: 32.8 G/DL (ref 31.5–36.3)
MCV RBC AUTO: 86.7 FL (ref 80–98)
MONOCYTES # BLD AUTO: 1.03 10*3/MM3 (ref 0–0.9)
MONOCYTES NFR BLD AUTO: 8.1 % (ref 0–12)
NEUTROPHILS # BLD AUTO: 6.93 10*3/MM3 (ref 2–8.6)
NEUTROPHILS NFR BLD AUTO: 54.5 % (ref 37–80)
PLATELET # BLD AUTO: 232 10*3/MM3 (ref 150–450)
PMV BLD AUTO: 12.3 FL (ref 8–12)
POTASSIUM BLD-SCNC: 4.3 MMOL/L (ref 3.5–5.1)
PROT SERPL-MCNC: 6.8 G/DL (ref 6.3–8.6)
RBC # BLD AUTO: 4.74 10*6/MM3 (ref 4.37–5.74)
SODIUM BLD-SCNC: 136 MMOL/L (ref 137–145)
WBC NRBC COR # BLD: 12.7 10*3/MM3 (ref 3.2–9.8)

## 2017-10-16 PROCEDURE — 88185 FLOWCYTOMETRY/TC ADD-ON: CPT

## 2017-10-16 PROCEDURE — 36415 COLL VENOUS BLD VENIPUNCTURE: CPT | Performed by: INTERNAL MEDICINE

## 2017-10-16 PROCEDURE — 88184 FLOWCYTOMETRY/ TC 1 MARKER: CPT

## 2017-10-16 PROCEDURE — 81270 JAK2 GENE: CPT

## 2017-10-16 PROCEDURE — 85025 COMPLETE CBC W/AUTO DIFF WBC: CPT

## 2017-10-16 PROCEDURE — 88189 FLOWCYTOMETRY/READ 16 & >: CPT

## 2017-10-16 PROCEDURE — 80053 COMPREHEN METABOLIC PANEL: CPT

## 2017-10-17 LAB — REF LAB TEST METHOD: NORMAL

## 2017-10-23 ENCOUNTER — OFFICE VISIT (OUTPATIENT)
Dept: ONCOLOGY | Facility: CLINIC | Age: 57
End: 2017-10-23

## 2017-10-23 VITALS
HEART RATE: 62 BPM | DIASTOLIC BLOOD PRESSURE: 73 MMHG | WEIGHT: 208 LBS | SYSTOLIC BLOOD PRESSURE: 133 MMHG | TEMPERATURE: 98.6 F | BODY MASS INDEX: 26.71 KG/M2 | RESPIRATION RATE: 18 BRPM

## 2017-10-23 DIAGNOSIS — D72.829 LEUKOCYTOSIS, UNSPECIFIED TYPE: Primary | ICD-10-CM

## 2017-10-23 PROCEDURE — 99406 BEHAV CHNG SMOKING 3-10 MIN: CPT | Performed by: INTERNAL MEDICINE

## 2017-10-23 PROCEDURE — 99213 OFFICE O/P EST LOW 20 MIN: CPT | Performed by: INTERNAL MEDICINE

## 2017-10-23 PROCEDURE — G0463 HOSPITAL OUTPT CLINIC VISIT: HCPCS | Performed by: INTERNAL MEDICINE

## 2017-10-23 NOTE — PROGRESS NOTES
DATE OF VISIT: 10/23/2017    REASON FOR VISIT: leukocytosis    HISTORY OF PRESENT ILLNESS:    56-year-old male with a past medical history significant for dyslipidemia, gout, type 2 diabetes mellitus history of splenectomy was seen initially in consultation on April 24, 2017 for leukocytosis is here for follow-up visit today.  He had a blood work done last week on my is here to discuss the result of blood work.  States he was seen in urgent care last week secondary to abdominal pain and was found to have constipation.  Denies any abnormal swollen and anywhere in the body.  Denies any fever or chills or night sweats.  Denies any blood in the stool or urine.      PAST MEDICAL HISTORY:    Past Medical History:   Diagnosis Date   • Diabetes mellitus    • Gout    • Hyperlipemia        SOCIAL HISTORY:    Social History   Substance Use Topics   • Smoking status: Current Every Day Smoker     Packs/day: 1.00     Types: Cigarettes   • Smokeless tobacco: None   • Alcohol use Yes       Surgical History :  Past Surgical History:   Procedure Laterality Date   • BACK SURGERY     • CHOLECYSTECTOMY     • COLONOSCOPY N/A 3/14/2017    Procedure: COLONOSCOPY;  Surgeon: Cachorro Perez DO;  Location: [facility] ENDOSCOPY;  Service:    • HAND SURGERY     • LEG SURGERY Right    • NOSE SURGERY     • PANCREAS SURGERY     • SPLENECTOMY         ALLERGIES:    No Known Allergies    REVIEW OF SYSTEMS:      CONSTITUTIONAL:  No fever, chills, or night sweats.     HEENT:  No epistaxis, mouth sores, or difficulty swallowing.    RESPIRATORY:  No new shortness of breath or cough at present.    CARDIOVASCULAR:  No chest pain or palpitations.    GASTROINTESTINAL: Positive for constipation. No abdominal pain, nausea, vomiting, or blood in the stool.    GENITOURINARY:  No dysuria or hematuria.    MUSCULOSKELETAL:  positive for chronic back pain. Complains of nodularity on left forearm.    NEUROLOGICAL:  No tingling or numbness. No new headache or dizziness.      LYMPHATICS:  Denies any abnormal swollen and anywhere in the body.    SKIN:  Denies any new skin rash.        PHYSICAL EXAMINATION:      VITAL SIGNS:  /73  Pulse 62  Temp 98.6 °F (37 °C)  Resp 18  Wt 208 lb (94.3 kg)  BMI 26.71 kg/m2    GENERAL:  Not in any distress.    HEENT:  Normocephalic, Atraumatic.Mild Conjunctival pallor. No icterus. Extraocular Movements Intact. No Facial Asymmetry noted.    NECK:  No adenopathy. No JVD.    RESPIRATORY:  Fair air entry bilateral. No rhonchi or wheezing.    CARDIOVASCULAR:  S1, S2. Regular rate and rhythm. No murmur or gallop appreciated.    ABDOMEN:  Soft, obese, nontender. Bowel sounds present in all four quadrants.  No organomegaly appreciated.    EXTREMITIES:  No edema.No Calf Tenderness.positive for nodularity on left forearm.    NEUROLOGIC:  Alert, awake and oriented ×3.  No  Motor or sensory deficit appreciated. Cranial Nerves 2-12 grossly intact.        DIAGNOSTIC DATA:    Glucose   Date Value Ref Range Status   10/16/2017 243 (H) 60 - 100 mg/dL Final     Sodium   Date Value Ref Range Status   10/16/2017 136 (L) 137 - 145 mmol/L Final     Potassium   Date Value Ref Range Status   10/16/2017 4.3 3.5 - 5.1 mmol/L Final     CO2   Date Value Ref Range Status   10/16/2017 27.0 22.0 - 31.0 mmol/L Final     Chloride   Date Value Ref Range Status   10/16/2017 100 95 - 110 mmol/L Final     Anion Gap   Date Value Ref Range Status   10/16/2017 9.0 5.0 - 15.0 mmol/L Final     Creatinine   Date Value Ref Range Status   10/16/2017 0.80 0.70 - 1.30 mg/dL Final     BUN   Date Value Ref Range Status   10/16/2017 15 7 - 21 mg/dL Final     BUN/Creatinine Ratio   Date Value Ref Range Status   10/16/2017 18.8 7.0 - 25.0 Final     Calcium   Date Value Ref Range Status   10/16/2017 9.1 8.4 - 10.2 mg/dL Final     eGFR Non  Amer   Date Value Ref Range Status   10/16/2017 99 56 - 130 mL/min/1.73 Final     Alkaline Phosphatase   Date Value Ref Range Status   10/16/2017 62  38 - 126 U/L Final     Total Protein   Date Value Ref Range Status   10/16/2017 6.8 6.3 - 8.6 g/dL Final     ALT (SGPT)   Date Value Ref Range Status   10/16/2017 33 21 - 72 U/L Final     AST (SGOT)   Date Value Ref Range Status   10/16/2017 20 17 - 59 U/L Final     Total Bilirubin   Date Value Ref Range Status   10/16/2017 0.5 0.2 - 1.3 mg/dL Final     Albumin   Date Value Ref Range Status   10/16/2017 4.10 3.40 - 4.80 g/dL Final     Globulin   Date Value Ref Range Status   10/16/2017 2.7 2.3 - 3.5 gm/dL Final     A/G Ratio   Date Value Ref Range Status   10/16/2017 1.5 1.1 - 1.8 g/dL Final     Lab Results   Component Value Date    WBC 12.70 (H) 10/16/2017    HGB 13.5 (L) 10/16/2017    HCT 41.1 10/16/2017    MCV 86.7 10/16/2017     10/16/2017     Lab Results   Component Value Date    NEUTROABS 6.93 10/16/2017     Peripheral blood flow cytometry done on October 16, 2017 was negative for any immunophenotypic abnormality.    JAK2 mutation done on peripheral blood on October 16, 2017 was negative.          ASSESSMENT AND PLAN:      1.  Chronic leukocytosis: Differential diagnosis at this point includes secondary to splenectomy versus reactive causes like inflammation.  Unlikely bone marrow pathology like myeloproliferative neoplasm or lymphoma.   his white blood cell count is still elevated at 12.7 most likely secondary to smoking plus splenectomy.  At this point we'll continue with a clinical monitoring.  We will see him back in about 4 months with a repeat CBC on that day.     2.  Diabetes mellitus     3.  Dyslipidemia     4.  Health maintenance: Patient unfortunately continues to smoke about 10 cigarettes per day, he was counseled about smoking cessation.  About 4 minutes were spent for smoking cessation counseling today.  He had a colonoscopy done in 03/17 which shows tubular adenoma.  He remains full code.     Marcio Ortiz MD  10/23/2017  11:19 AM        EMR Dragon/Transcription disclaimer:   Much of this  encounter note is an electronic transcription/translation of spoken language to printed text. The electronic translation of spoken language may permit erroneous, or at times, nonsensical words or phrases to be inadvertently transcribed; Although I have reviewed the note for such errors, some may still exist.

## 2017-11-06 LAB — REF LAB TEST METHOD: NORMAL

## 2018-02-23 ENCOUNTER — APPOINTMENT (OUTPATIENT)
Dept: ONCOLOGY | Facility: HOSPITAL | Age: 58
End: 2018-02-23

## 2018-03-02 ENCOUNTER — OFFICE VISIT (OUTPATIENT)
Dept: ONCOLOGY | Facility: CLINIC | Age: 58
End: 2018-03-02

## 2018-03-02 ENCOUNTER — LAB (OUTPATIENT)
Dept: ONCOLOGY | Facility: HOSPITAL | Age: 58
End: 2018-03-02

## 2018-03-02 VITALS
HEIGHT: 74 IN | HEART RATE: 70 BPM | RESPIRATION RATE: 16 BRPM | DIASTOLIC BLOOD PRESSURE: 72 MMHG | BODY MASS INDEX: 27.87 KG/M2 | SYSTOLIC BLOOD PRESSURE: 111 MMHG | WEIGHT: 217.15 LBS | TEMPERATURE: 97.8 F

## 2018-03-02 DIAGNOSIS — D72.829 LEUKOCYTOSIS, UNSPECIFIED TYPE: Primary | ICD-10-CM

## 2018-03-02 DIAGNOSIS — D72.829 LEUKOCYTOSIS, UNSPECIFIED TYPE: ICD-10-CM

## 2018-03-02 LAB
BASOPHILS # BLD AUTO: 0.07 10*3/MM3 (ref 0–0.2)
BASOPHILS NFR BLD AUTO: 0.5 % (ref 0–2)
DEPRECATED RDW RBC AUTO: 41.8 FL (ref 35.1–43.9)
EOSINOPHIL # BLD AUTO: 0.36 10*3/MM3 (ref 0–0.7)
EOSINOPHIL NFR BLD AUTO: 2.5 % (ref 0–7)
ERYTHROCYTE [DISTWIDTH] IN BLOOD BY AUTOMATED COUNT: 13.4 % (ref 11.5–14.5)
HCT VFR BLD AUTO: 42.2 % (ref 39–49)
HGB BLD-MCNC: 14.3 G/DL (ref 13.7–17.3)
IMM GRANULOCYTES # BLD: 0.06 10*3/MM3 (ref 0–0.02)
IMM GRANULOCYTES NFR BLD: 0.4 % (ref 0–0.5)
LYMPHOCYTES # BLD AUTO: 4.27 10*3/MM3 (ref 0.6–4.2)
LYMPHOCYTES NFR BLD AUTO: 29.4 % (ref 10–50)
MCH RBC QN AUTO: 29 PG (ref 26.5–34)
MCHC RBC AUTO-ENTMCNC: 33.9 G/DL (ref 31.5–36.3)
MCV RBC AUTO: 85.6 FL (ref 80–98)
MONOCYTES # BLD AUTO: 1.23 10*3/MM3 (ref 0–0.9)
MONOCYTES NFR BLD AUTO: 8.5 % (ref 0–12)
NEUTROPHILS # BLD AUTO: 8.51 10*3/MM3 (ref 2–8.6)
NEUTROPHILS NFR BLD AUTO: 58.7 % (ref 37–80)
PLATELET # BLD AUTO: 231 10*3/MM3 (ref 150–450)
PMV BLD AUTO: 11 FL (ref 8–12)
RBC # BLD AUTO: 4.93 10*6/MM3 (ref 4.37–5.74)
WBC NRBC COR # BLD: 14.5 10*3/MM3 (ref 3.2–9.8)

## 2018-03-02 PROCEDURE — 85025 COMPLETE CBC W/AUTO DIFF WBC: CPT | Performed by: INTERNAL MEDICINE

## 2018-03-02 PROCEDURE — 99214 OFFICE O/P EST MOD 30 MIN: CPT | Performed by: NURSE PRACTITIONER

## 2018-03-02 PROCEDURE — 36415 COLL VENOUS BLD VENIPUNCTURE: CPT | Performed by: NURSE PRACTITIONER

## 2018-03-02 NOTE — PROGRESS NOTES
DATE OF VISIT: 3/2/2018    REASON FOR VISIT:  4 month follow-up    HISTORY OF PRESENT ILLNESS:  57-year-old male with a past medical history significant for dyslipidemia, gout, type 2 diabetes mellitus history of splenectomy was seen initially in consultation on April 24, 2017 for leukocytosis; Work-up included ALICE 2 mutation that was negative and peripheral flow cytometry that was negative. It was felt that his elevated WBC was due to chronic inflammation, previous splenectomy and smoking history; highly unlikely for any underlying bone marrow abnormality or lymphoma.    Pt is here today for follow-up. Since he was here last he was seen by urologist in [place] for suspected bladder wall thickening, per pt cystoscopy was negative for any malignancy. He unfortunately continues to smoke about pack per day.    PAST MEDICAL HISTORY:    Past Medical History:   Diagnosis Date   • Diabetes mellitus    • Gout    • Hyperlipemia        SOCIAL HISTORY:    Social History   Substance Use Topics   • Smoking status: Current Every Day Smoker     Packs/day: 1.00     Types: Cigarettes   • Smokeless tobacco: None   • Alcohol use Yes       Surgical History :  Past Surgical History:   Procedure Laterality Date   • BACK SURGERY     • CHOLECYSTECTOMY     • COLONOSCOPY N/A 3/14/2017    Procedure: COLONOSCOPY;  Surgeon: Cachorro Perez DO;  Location: [facility] ENDOSCOPY;  Service:    • HAND SURGERY     • LEG SURGERY Right    • NOSE SURGERY     • PANCREAS SURGERY     • SPLENECTOMY         ALLERGIES:    No Known Allergies    REVIEW OF SYSTEMS:      CONSTITUTIONAL:  No fever, chills, or night sweats.     HEENT:  No epistaxis, mouth sores, or difficulty swallowing.    RESPIRATORY:  No new shortness of breath or cough at present.    CARDIOVASCULAR:  No chest pain or palpitations.    GASTROINTESTINAL:  No abdominal pain, nausea, vomiting, or blood in the stool.    GENITOURINARY:  No dysuria or hematuria.    MUSCULOSKELETAL:  No any new back pain  "or arthralgias.     NEUROLOGICAL:  No tingling or numbness. No new headache or dizziness.     LYMPHATICS:  Denies any abnormal swollen and anywhere in the body.    SKIN:  Denies any new skin rash.    PHYSICAL EXAMINATION:      VITAL SIGNS:  /72  Pulse 70  Temp 97.8 °F (36.6 °C) (Temporal Artery )   Resp 16  Ht 188 cm (74.02\")  Wt 98.5 kg (217 lb 2.5 oz)  BMI 27.87 kg/m2    GENERAL:  Not in any distress.    HEENT:  Normocephalic, Atraumatic.Mild Conjunctival pallor. No icterus.  No Facial Asymmetry noted.    NECK:  No adenopathy. No JVD.    RESPIRATORY:  Fair air entry bilateral. No rhonchi or wheezing.    CARDIOVASCULAR:  S1, S2. Regular rate and rhythm. No murmur or gallop appreciated.    ABDOMEN:  Soft, obese, nontender. Bowel sounds present in all four quadrants.  No organomegaly appreciated.    EXTREMITIES:  No edema.No Calf Tenderness.    NEUROLOGIC:  Alert, awake and oriented ×3.    SKIN : No new skin lesion identified  DIAGNOSTIC DATA:    Glucose   Date Value Ref Range Status   10/16/2017 243 (H) 60 - 100 mg/dL Final     Sodium   Date Value Ref Range Status   10/16/2017 136 (L) 137 - 145 mmol/L Final     Potassium   Date Value Ref Range Status   10/16/2017 4.3 3.5 - 5.1 mmol/L Final     CO2   Date Value Ref Range Status   10/16/2017 27.0 22.0 - 31.0 mmol/L Final     Chloride   Date Value Ref Range Status   10/16/2017 100 95 - 110 mmol/L Final     Anion Gap   Date Value Ref Range Status   10/16/2017 9.0 5.0 - 15.0 mmol/L Final     Creatinine   Date Value Ref Range Status   10/16/2017 0.80 0.70 - 1.30 mg/dL Final     BUN   Date Value Ref Range Status   10/16/2017 15 7 - 21 mg/dL Final     BUN/Creatinine Ratio   Date Value Ref Range Status   10/16/2017 18.8 7.0 - 25.0 Final     Calcium   Date Value Ref Range Status   10/16/2017 9.1 8.4 - 10.2 mg/dL Final     eGFR Non  Amer   Date Value Ref Range Status   10/16/2017 99 56 - 130 mL/min/1.73 Final     Alkaline Phosphatase   Date Value Ref Range " Status   10/16/2017 62 38 - 126 U/L Final     Total Protein   Date Value Ref Range Status   10/16/2017 6.8 6.3 - 8.6 g/dL Final     ALT (SGPT)   Date Value Ref Range Status   10/16/2017 33 21 - 72 U/L Final     AST (SGOT)   Date Value Ref Range Status   10/16/2017 20 17 - 59 U/L Final     Total Bilirubin   Date Value Ref Range Status   10/16/2017 0.5 0.2 - 1.3 mg/dL Final     Albumin   Date Value Ref Range Status   10/16/2017 4.10 3.40 - 4.80 g/dL Final     Globulin   Date Value Ref Range Status   10/16/2017 2.7 2.3 - 3.5 gm/dL Final     A/G Ratio   Date Value Ref Range Status   10/16/2017 1.5 1.1 - 1.8 g/dL Final     Lab Results   Component Value Date    WBC 14.50 (H) 03/02/2018    HGB 14.3 03/02/2018    HCT 42.2 03/02/2018    MCV 85.6 03/02/2018     03/02/2018     Lab Results   Component Value Date    NEUTROABS 8.51 03/02/2018     Lab Results   Component Value Date    REFLABREPO see attached report 10/16/2017    REFLABREPO SEE ATTACHED REPORT 10/16/2017   ]  Peripheral blood flow cytometry done on October 16, 2017 was negative for any immunophenotypic abnormality.     JAK2 mutation done on peripheral blood on October 16, 2017 was negative.        ASSESSMENT AND PLAN:      1. Chronic leukocytosis, most likely secondary to chronic smoking vs splenectomy vs chronic inflammation; unlikely that this is an underlying bone marrow issue. His WBC is stable at this time, will plan to continue observation and recheck CBC again in 6 mos.    2. Health maintenance, pt continues to smoke; spent about 4 minutes discussing the hazards of smoking; including increased risk for developing lung cancer, bladder cancer and renal cell cancer. Patient had a colonoscopy in 2017.    3. Diabetes, being managed by Dr. Atkins.       This document has been signed by NIKIA Feliciano on March 2, 2018 10:03 AM

## 2018-07-16 ENCOUNTER — CONSULT (OUTPATIENT)
Dept: SLEEP MEDICINE | Facility: HOSPITAL | Age: 58
End: 2018-07-16

## 2018-07-16 VITALS
WEIGHT: 222 LBS | HEIGHT: 74 IN | HEART RATE: 87 BPM | SYSTOLIC BLOOD PRESSURE: 113 MMHG | DIASTOLIC BLOOD PRESSURE: 82 MMHG | OXYGEN SATURATION: 98 % | BODY MASS INDEX: 28.49 KG/M2

## 2018-07-16 DIAGNOSIS — F51.04 PSYCHOPHYSIOLOGICAL INSOMNIA: Primary | ICD-10-CM

## 2018-07-16 DIAGNOSIS — G47.33 OBSTRUCTIVE SLEEP APNEA, ADULT: ICD-10-CM

## 2018-07-16 PROCEDURE — 99204 OFFICE O/P NEW MOD 45 MIN: CPT | Performed by: INTERNAL MEDICINE

## 2018-07-16 NOTE — PROGRESS NOTES
New Patient Sleep Medicine Consultation    Encounter Date: 2018         Patient's PCP: Guanako Atkins MD  Referring provider: No ref. provider found  Reason for consultation: excessive daytime sleepiness and insomnia    Sandoval Navarro is a 57 y.o. male who admits to disturbed sleep for the past 6-8 months. He has been helping to care for his father-in-law for that time. He passed away 3 days ago. He snoring, unrestful sleep, decreased libido, excessive daytime sleepiness, sleeping less than 6 hours per night, Disturbed or restless sleep, Up to the bathroom at night, night sweats, teeth grinding, restless legs at night, sleepwalking or sleeptalking and difficulty falling asleep.  He denies cataplexy, sleep paralysis, or hypnagogic hallucinations. His bedtime varies because his in law was up and down all night. He tries to go at [id number]. He retired last year and things have changed. He  falls asleep after  minutes, and is up 2-6 times per night. He wakes up ~ 0800. He endorses 4-6 hours of sleep. He drinks 0 cups of coffee, 0 teas, and 5 sodas per day. He drinks 6 alcoholic beverages per week. He is a 1.5 ppd smoker. He does not take sedatives or hypnotics. He has no sleepiness with driving. He rarely naps.    [place] - 2    Past Medical History:   Diagnosis Date   • Diabetes mellitus (CMS/HCC)    • Gout    • Hyperlipemia      Social History     Social History   • Marital status: Single     Spouse name: N/A   • Number of children: N/A   • Years of education: N/A     Occupational History   • Not on file.     Social History Main Topics   • Smoking status: Current Every Day Smoker     Packs/day: 1.00     Types: Cigarettes   • Smokeless tobacco: Not on file   • Alcohol use Yes   • Drug use: No   • Sexual activity: Defer     Other Topics Concern   • Not on file     Social History Narrative   • No narrative on file     No family history on file.  7 brothers and 3 sisters - 1st one  in the begging of  "the year  Other family history + for: heart disease  Smoking history: smoked 1.5 ppds from age 10 until present  FH of sleep disorders: brother with ALICE    Review of Systems:  Constitutional: negative  Eyes: negative  Ears, nose, mouth, throat, and face: negative  Respiratory: negative  Cardiovascular: positive for irregular heart beat - has heart workup next week  Gastrointestinal: negative  Genitourinary:negative  Integument/breast: negative  Hematologic/lymphatic: negative  Musculoskeletal:negative  Neurological: negative  Behavioral/Psych: negative  Endocrine: negative  Allergic/Immunologic: negative Patient advised to discuss any positive ROS with PCP.      Vitals:    07/16/18 1544   BP: 113/82   Pulse: 87   SpO2: 98%     Body mass index is 28.5 kg/m². Patient's Body mass index is 28.5 kg/m². BMI is above normal parameters. Recommendations include: no follow-up required.    Neck circumference: 15\"            General: Alert. Cooperative. Well developed. No acute distress.             Head:  Normocephalic. Symmetrical. Atraumatic.              Eyes: Sclera clear. No icterus. PERRLA. Normal EOM.             Ears: No deformities. Normal hearing.             Nose: No septal deviation. No drainage.          Throat: No oral lesions. No thrush. Moist mucous membranes.    Tongue is enlarged    Dentition is upper and lower dentures       Pharynx: Posterior pharyngeal pillars are narrow    Mallampati score of IV (only hard palate visible)    Pharynx is normal with unrermarkable tonsils   Chest Wall:  Normal shape. Symmetric expansion with respiration. No tenderness.             Neck:  Trachea midline.           Lungs:  Clear to auscultation bilaterally. No wheezes. No rhonchi. No rales. Respirations regular, even and unlabored.            Heart:  Regular rhythm and normal rate. Normal S1 and S2. No murmur.     Abdomen:  Soft, non-tender and non-distended. Normal bowel sounds. No masses.  Extremities:  Moves all " extremities well. No edema.           Pulses: Pulses palpable and equal bilaterally.               Skin: Dry. Intact. No bleeding. No rash.           Neuro: Moves all 4 extremities and cranial nerves grossly intact.  Psychiatric: Normal mood and affect.      Current Outpatient Prescriptions:   •  aspirin 81 MG chewable tablet, Chew 81 mg Daily., Disp: , Rfl:   •  diltiaZEM (CARDIZEM) 60 MG tablet, Take 30 mg by mouth 2 (Two) Times a Day. Patient will bring , Disp: , Rfl:   •  gabapentin (NEURONTIN) 100 MG capsule, Take 100 mg by mouth Daily., Disp: , Rfl:   •  glucose blood test strip, USE TO TEST BLOOD SUGAR 3 TIMES A DAY, Disp: , Rfl:   •  insulin aspart (novoLOG FLEXPEN) 100 UNIT/ML solution pen-injector sc pen, Up 15u sq tid, Disp: , Rfl:   •  Insulin Glargine (BASAGLAR KWIKPEN) 100 UNIT/ML injection pen, INJECT 35 UNITS EVERY MORNING AND 40 UNITS EVERY EVENING, Disp: , Rfl: 5  •  magnesium oxide (MAG-OX) 400 MG tablet, Take 400 mg by mouth 2 (Two) Times a Day., Disp: , Rfl:   •  nicotine (NICODERM CQ) 14 MG/24HR patch, Place 1 patch on the skin Daily., Disp: 15 patch, Rfl: 2  •  simvastatin (ZOCOR) 10 MG tablet, Take 10 mg by mouth Every Night., Disp: , Rfl:     Contraindications to home sleep test: none    ASSESSMENT:  1. Obstructive sleep apnea   1. Check home sleep study  2. Insomnia  1. Related to care and grief- consider sleep aide  2. Trial of melatonin at 9 pm  3. DM  1. Per pcp    RTC in 3 months         This document has been electronically signed by Quang Corbin MD on July 16, 2018         CC: Guanako Atkins MD          No ref. provider found

## 2018-08-08 ENCOUNTER — APPOINTMENT (OUTPATIENT)
Dept: SLEEP MEDICINE | Facility: HOSPITAL | Age: 58
End: 2018-08-08

## 2018-08-08 ENCOUNTER — HOSPITAL ENCOUNTER (OUTPATIENT)
Facility: HOSPITAL | Age: 58
Setting detail: OBSERVATION
Discharge: HOME | End: 2018-08-10
Attending: EMERGENCY MEDICINE | Admitting: EMERGENCY MEDICINE

## 2018-08-08 ENCOUNTER — APPOINTMENT (OUTPATIENT)
Dept: GENERAL RADIOLOGY | Facility: HOSPITAL | Age: 58
End: 2018-08-08

## 2018-08-08 DIAGNOSIS — R06.09 DYSPNEA ON EXERTION: ICD-10-CM

## 2018-08-08 DIAGNOSIS — I73.9 PVD (PERIPHERAL VASCULAR DISEASE) WITH CLAUDICATION (HCC): ICD-10-CM

## 2018-08-08 DIAGNOSIS — R07.9 CHEST PAIN, UNSPECIFIED TYPE: Primary | ICD-10-CM

## 2018-08-08 LAB
ALBUMIN SERPL-MCNC: 4.3 G/DL (ref 3.4–4.8)
ALBUMIN/GLOB SERPL: 1.4 G/DL (ref 1.1–1.8)
ALP SERPL-CCNC: 70 U/L (ref 38–126)
ALT SERPL W P-5'-P-CCNC: 32 U/L (ref 21–72)
ANION GAP SERPL CALCULATED.3IONS-SCNC: 7 MMOL/L (ref 5–15)
AST SERPL-CCNC: 34 U/L (ref 17–59)
BASOPHILS # BLD AUTO: 0.08 10*3/MM3 (ref 0–0.2)
BASOPHILS NFR BLD AUTO: 0.8 % (ref 0–2)
BILIRUB SERPL-MCNC: 0.3 MG/DL (ref 0.2–1.3)
BUN BLD-MCNC: 15 MG/DL (ref 7–21)
BUN/CREAT SERPL: 17 (ref 7–25)
CALCIUM SPEC-SCNC: 9 MG/DL (ref 8.4–10.2)
CHLORIDE SERPL-SCNC: 105 MMOL/L (ref 95–110)
CO2 SERPL-SCNC: 27 MMOL/L (ref 22–31)
CREAT BLD-MCNC: 0.88 MG/DL (ref 0.7–1.3)
DEPRECATED RDW RBC AUTO: 41.9 FL (ref 35.1–43.9)
EOSINOPHIL # BLD AUTO: 0.46 10*3/MM3 (ref 0–0.7)
EOSINOPHIL NFR BLD AUTO: 4.5 % (ref 0–7)
ERYTHROCYTE [DISTWIDTH] IN BLOOD BY AUTOMATED COUNT: 13.4 % (ref 11.5–14.5)
GFR SERPL CREATININE-BSD FRML MDRD: 89 ML/MIN/1.73 (ref 56–130)
GLOBULIN UR ELPH-MCNC: 3.1 GM/DL (ref 2.3–3.5)
GLUCOSE BLD-MCNC: 137 MG/DL (ref 60–100)
GLUCOSE BLDC GLUCOMTR-MCNC: 106 MG/DL (ref 70–130)
GLUCOSE BLDC GLUCOMTR-MCNC: 350 MG/DL (ref 70–130)
HCT VFR BLD AUTO: 41.5 % (ref 39–49)
HGB BLD-MCNC: 14 G/DL (ref 13.7–17.3)
HOLD SPECIMEN: NORMAL
HOLD SPECIMEN: NORMAL
IMM GRANULOCYTES # BLD: 0.03 10*3/MM3 (ref 0–0.02)
IMM GRANULOCYTES NFR BLD: 0.3 % (ref 0–0.5)
INR PPP: 0.96 (ref 0.8–1.2)
LYMPHOCYTES # BLD AUTO: 4.45 10*3/MM3 (ref 0.6–4.2)
LYMPHOCYTES NFR BLD AUTO: 44 % (ref 10–50)
MCH RBC QN AUTO: 28.7 PG (ref 26.5–34)
MCHC RBC AUTO-ENTMCNC: 33.7 G/DL (ref 31.5–36.3)
MCV RBC AUTO: 85.2 FL (ref 80–98)
MONOCYTES # BLD AUTO: 0.89 10*3/MM3 (ref 0–0.9)
MONOCYTES NFR BLD AUTO: 8.8 % (ref 0–12)
NEUTROPHILS # BLD AUTO: 4.2 10*3/MM3 (ref 2–8.6)
NEUTROPHILS NFR BLD AUTO: 41.6 % (ref 37–80)
NT-PROBNP SERPL-MCNC: 33.5 PG/ML (ref 0–900)
PLATELET # BLD AUTO: 236 10*3/MM3 (ref 150–450)
PMV BLD AUTO: 11.5 FL (ref 8–12)
POTASSIUM BLD-SCNC: 4.6 MMOL/L (ref 3.5–5.1)
PROT SERPL-MCNC: 7.4 G/DL (ref 6.3–8.6)
PROTHROMBIN TIME: 12.6 SECONDS (ref 11.1–15.3)
RBC # BLD AUTO: 4.87 10*6/MM3 (ref 4.37–5.74)
SODIUM BLD-SCNC: 139 MMOL/L (ref 137–145)
TROPONIN I SERPL-MCNC: <0.012 NG/ML
TROPONIN I SERPL-MCNC: <0.012 NG/ML
WBC NRBC COR # BLD: 10.11 10*3/MM3 (ref 3.2–9.8)
WHOLE BLOOD HOLD SPECIMEN: NORMAL
WHOLE BLOOD HOLD SPECIMEN: NORMAL

## 2018-08-08 PROCEDURE — 63710000001 INSULIN ASPART PER 5 UNITS: Performed by: INTERNAL MEDICINE

## 2018-08-08 PROCEDURE — 85025 COMPLETE CBC W/AUTO DIFF WBC: CPT | Performed by: PHYSICIAN ASSISTANT

## 2018-08-08 PROCEDURE — G0378 HOSPITAL OBSERVATION PER HR: HCPCS

## 2018-08-08 PROCEDURE — 83880 ASSAY OF NATRIURETIC PEPTIDE: CPT | Performed by: PHYSICIAN ASSISTANT

## 2018-08-08 PROCEDURE — 93010 ELECTROCARDIOGRAM REPORT: CPT | Performed by: INTERNAL MEDICINE

## 2018-08-08 PROCEDURE — 96361 HYDRATE IV INFUSION ADD-ON: CPT

## 2018-08-08 PROCEDURE — 85610 PROTHROMBIN TIME: CPT | Performed by: PHYSICIAN ASSISTANT

## 2018-08-08 PROCEDURE — 25010000002 ENOXAPARIN PER 10 MG: Performed by: INTERNAL MEDICINE

## 2018-08-08 PROCEDURE — 71045 X-RAY EXAM CHEST 1 VIEW: CPT

## 2018-08-08 PROCEDURE — 93005 ELECTROCARDIOGRAM TRACING: CPT | Performed by: PHYSICIAN ASSISTANT

## 2018-08-08 PROCEDURE — 84484 ASSAY OF TROPONIN QUANT: CPT | Performed by: PHYSICIAN ASSISTANT

## 2018-08-08 PROCEDURE — 80053 COMPREHEN METABOLIC PANEL: CPT | Performed by: PHYSICIAN ASSISTANT

## 2018-08-08 PROCEDURE — 82962 GLUCOSE BLOOD TEST: CPT

## 2018-08-08 PROCEDURE — 84484 ASSAY OF TROPONIN QUANT: CPT | Performed by: INTERNAL MEDICINE

## 2018-08-08 PROCEDURE — 93005 ELECTROCARDIOGRAM TRACING: CPT | Performed by: INTERNAL MEDICINE

## 2018-08-08 PROCEDURE — 63710000001 INSULIN DETEMIR PER 5 UNITS: Performed by: INTERNAL MEDICINE

## 2018-08-08 PROCEDURE — 93005 ELECTROCARDIOGRAM TRACING: CPT | Performed by: EMERGENCY MEDICINE

## 2018-08-08 PROCEDURE — 83550 IRON BINDING TEST: CPT | Performed by: NURSE PRACTITIONER

## 2018-08-08 PROCEDURE — 96372 THER/PROPH/DIAG INJ SC/IM: CPT

## 2018-08-08 PROCEDURE — 99285 EMERGENCY DEPT VISIT HI MDM: CPT

## 2018-08-08 PROCEDURE — 96360 HYDRATION IV INFUSION INIT: CPT

## 2018-08-08 PROCEDURE — 83540 ASSAY OF IRON: CPT | Performed by: NURSE PRACTITIONER

## 2018-08-08 RX ORDER — GABAPENTIN 100 MG/1
100 CAPSULE ORAL DAILY
Status: DISCONTINUED | OUTPATIENT
Start: 2018-08-08 | End: 2018-08-10 | Stop reason: HOSPADM

## 2018-08-08 RX ORDER — ATORVASTATIN CALCIUM 10 MG/1
10 TABLET, FILM COATED ORAL DAILY
Status: DISCONTINUED | OUTPATIENT
Start: 2018-08-08 | End: 2018-08-10 | Stop reason: HOSPADM

## 2018-08-08 RX ORDER — IPRATROPIUM BROMIDE AND ALBUTEROL SULFATE 2.5; .5 MG/3ML; MG/3ML
3 SOLUTION RESPIRATORY (INHALATION) EVERY 6 HOURS PRN
Status: DISCONTINUED | OUTPATIENT
Start: 2018-08-08 | End: 2018-08-10 | Stop reason: HOSPADM

## 2018-08-08 RX ORDER — NICOTINE 21 MG/24HR
1 PATCH, TRANSDERMAL 24 HOURS TRANSDERMAL EVERY 24 HOURS
Status: DISCONTINUED | OUTPATIENT
Start: 2018-08-08 | End: 2018-08-10 | Stop reason: HOSPADM

## 2018-08-08 RX ORDER — TAMSULOSIN HYDROCHLORIDE 0.4 MG/1
1 CAPSULE ORAL DAILY
Status: ON HOLD | COMMUNITY

## 2018-08-08 RX ORDER — ACETAMINOPHEN 325 MG/1
650 TABLET ORAL EVERY 4 HOURS PRN
Status: DISCONTINUED | OUTPATIENT
Start: 2018-08-08 | End: 2018-08-10 | Stop reason: HOSPADM

## 2018-08-08 RX ORDER — DEXTROSE MONOHYDRATE 25 G/50ML
25 INJECTION, SOLUTION INTRAVENOUS
Status: DISCONTINUED | OUTPATIENT
Start: 2018-08-08 | End: 2018-08-10 | Stop reason: HOSPADM

## 2018-08-08 RX ORDER — MORPHINE SULFATE 2 MG/ML
1 INJECTION, SOLUTION INTRAMUSCULAR; INTRAVENOUS EVERY 4 HOURS PRN
Status: DISCONTINUED | OUTPATIENT
Start: 2018-08-08 | End: 2018-08-10 | Stop reason: HOSPADM

## 2018-08-08 RX ORDER — ASPIRIN 81 MG/1
81 TABLET, CHEWABLE ORAL DAILY
Status: DISCONTINUED | OUTPATIENT
Start: 2018-08-09 | End: 2018-08-10 | Stop reason: HOSPADM

## 2018-08-08 RX ORDER — ASPIRIN 81 MG/1
324 TABLET, CHEWABLE ORAL ONCE
Status: COMPLETED | OUTPATIENT
Start: 2018-08-08 | End: 2018-08-08

## 2018-08-08 RX ORDER — BISACODYL 5 MG/1
5 TABLET, DELAYED RELEASE ORAL DAILY PRN
Status: DISCONTINUED | OUTPATIENT
Start: 2018-08-08 | End: 2018-08-10 | Stop reason: HOSPADM

## 2018-08-08 RX ORDER — ONDANSETRON 2 MG/ML
4 INJECTION INTRAMUSCULAR; INTRAVENOUS EVERY 6 HOURS PRN
Status: DISCONTINUED | OUTPATIENT
Start: 2018-08-08 | End: 2018-08-10 | Stop reason: HOSPADM

## 2018-08-08 RX ORDER — SODIUM CHLORIDE 9 MG/ML
100 INJECTION, SOLUTION INTRAVENOUS CONTINUOUS
Status: DISCONTINUED | OUTPATIENT
Start: 2018-08-08 | End: 2018-08-09

## 2018-08-08 RX ORDER — SODIUM CHLORIDE 0.9 % (FLUSH) 0.9 %
10 SYRINGE (ML) INJECTION AS NEEDED
Status: DISCONTINUED | OUTPATIENT
Start: 2018-08-08 | End: 2018-08-10 | Stop reason: HOSPADM

## 2018-08-08 RX ORDER — SODIUM CHLORIDE 0.9 % (FLUSH) 0.9 %
1-10 SYRINGE (ML) INJECTION AS NEEDED
Status: DISCONTINUED | OUTPATIENT
Start: 2018-08-08 | End: 2018-08-10 | Stop reason: HOSPADM

## 2018-08-08 RX ORDER — NALOXONE HCL 0.4 MG/ML
0.4 VIAL (ML) INJECTION
Status: DISCONTINUED | OUTPATIENT
Start: 2018-08-08 | End: 2018-08-10 | Stop reason: HOSPADM

## 2018-08-08 RX ORDER — NICOTINE POLACRILEX 4 MG
15 LOZENGE BUCCAL
Status: DISCONTINUED | OUTPATIENT
Start: 2018-08-08 | End: 2018-08-10 | Stop reason: HOSPADM

## 2018-08-08 RX ORDER — MELOXICAM 15 MG/1
15 TABLET ORAL DAILY
Status: ON HOLD | COMMUNITY
End: 2023-04-04

## 2018-08-08 RX ORDER — ALUMINA, MAGNESIA, AND SIMETHICONE 2400; 2400; 240 MG/30ML; MG/30ML; MG/30ML
15 SUSPENSION ORAL EVERY 6 HOURS PRN
Status: DISCONTINUED | OUTPATIENT
Start: 2018-08-08 | End: 2018-08-10 | Stop reason: HOSPADM

## 2018-08-08 RX ADMIN — INSULIN DETEMIR 35 UNITS: 100 INJECTION, SOLUTION SUBCUTANEOUS at 20:52

## 2018-08-08 RX ADMIN — ASPIRIN 81 MG 324 MG: 81 TABLET ORAL at 12:37

## 2018-08-08 RX ADMIN — DILTIAZEM HYDROCHLORIDE 30 MG: 30 TABLET, FILM COATED ORAL at 20:51

## 2018-08-08 RX ADMIN — GABAPENTIN 100 MG: 100 CAPSULE ORAL at 20:51

## 2018-08-08 RX ADMIN — NICOTINE 1 PATCH: 14 PATCH, EXTENDED RELEASE TRANSDERMAL at 17:38

## 2018-08-08 RX ADMIN — SODIUM CHLORIDE 100 ML/HR: 9 INJECTION, SOLUTION INTRAVENOUS at 16:58

## 2018-08-08 RX ADMIN — INSULIN ASPART 12 UNITS: 100 INJECTION, SOLUTION INTRAVENOUS; SUBCUTANEOUS at 20:51

## 2018-08-08 RX ADMIN — ENOXAPARIN SODIUM 40 MG: 100 INJECTION SUBCUTANEOUS at 17:37

## 2018-08-08 RX ADMIN — ATORVASTATIN CALCIUM 10 MG: 10 TABLET, FILM COATED ORAL at 20:51

## 2018-08-08 NOTE — PLAN OF CARE
Problem: Patient Care Overview  Goal: Plan of Care Review  Outcome: Ongoing (interventions implemented as appropriate)   08/08/18 1703   Coping/Psychosocial   Plan of Care Reviewed With patient   Plan of Care Review   Progress no change   OTHER   Outcome Summary new admit; no CP     Goal: Individualization and Mutuality  Outcome: Ongoing (interventions implemented as appropriate)    Goal: Discharge Needs Assessment  Outcome: Ongoing (interventions implemented as appropriate)    Goal: Interprofessional Rounds/Family Conf  Outcome: Ongoing (interventions implemented as appropriate)      Problem: Cardiac: ACS (Acute Coronary Syndrome) (Adult)  Goal: Signs and Symptoms of Listed Potential Problems Will be Absent, Minimized or Managed (Cardiac: ACS)  Outcome: Ongoing (interventions implemented as appropriate)

## 2018-08-08 NOTE — H&P
[facility] Medicine Services  INPATIENT HISTORY AND PHYSICAL       Patient Care Team:  Guanako Atkins MD as PCP - General  Marcio Ortiz MD as Consulting Physician (Hematology and Oncology)  Tresa Yo APRN as Nurse Practitioner (Oncology)    Chief complaint   Chief Complaint   Patient presents with   • Chest Pain       Subjective     Patient is a 57 y.o. male history of hypertension, dyslipidemia, diabetes mellitus, nicotine dependence presents with one-week history of intermittent left-sided chest pain.  Pain does not radiate and typically lasts about 10 minutes before it resolves.  There is no specific aggravating or relieving factors.  Patient denies associated shortness of air, nausea, vomiting, palpitation, diaphoresis, syncope or presyncope.  He had seen his cardiologist at [place] as an outpatient yesterday but left before his assessment could be completed.    He had blood work in the emergency department and noted to have normal troponin, normal chest x-ray and EKG that showed normal sinus rhythm with no acute changes.    Family and social history: Patient lives with his spouse and able to take care of his activities of daily living.  He has been smoking an average of one pack of cigarettes a day for more than 40 years.  He drinks alcohol on social occasions.  There is family history of hypertension and heart disease.    Review of Systems   Constitutional: Negative for activity change, appetite change, chills, diaphoresis, fatigue and fever.   HENT: Negative for trouble swallowing and voice change.    Eyes: Negative for photophobia and visual disturbance.   Respiratory: Negative for cough, choking, chest tightness, shortness of breath, wheezing and stridor.    Cardiovascular: Positive for chest pain. Negative for palpitations and leg swelling.   Gastrointestinal: Negative for abdominal distention, abdominal pain, blood in stool, constipation, diarrhea,  nausea and vomiting.   Endocrine: Negative for cold intolerance, heat intolerance, polydipsia, polyphagia and polyuria.   Genitourinary: Negative for decreased urine volume, difficulty urinating, dysuria, enuresis, flank pain, frequency, hematuria and urgency.   Musculoskeletal: Negative for arthralgias, gait problem, myalgias, neck pain and neck stiffness.   Skin: Negative for pallor, rash and wound.   Neurological: Negative for dizziness, tremors, seizures, syncope, facial asymmetry, speech difficulty, weakness, light-headedness, numbness and headaches.   Hematological: Does not bruise/bleed easily.   Psychiatric/Behavioral: Negative for agitation, behavioral problems and confusion.         History  Past Medical History:   Diagnosis Date   • Diabetes mellitus (CMS/HCC)    • Gout    • Hyperlipemia      Past Surgical History:   Procedure Laterality Date   • BACK SURGERY     • CHOLECYSTECTOMY     • COLONOSCOPY N/A 3/14/2017    Procedure: COLONOSCOPY;  Surgeon: Cachorro Perez DO;  Location: [facility] ENDOSCOPY;  Service:    • HAND SURGERY     • LEG SURGERY Right    • NOSE SURGERY     • PANCREAS SURGERY     • SPLENECTOMY       History reviewed. No pertinent family history.  Social History   Substance Use Topics   • Smoking status: Current Every Day Smoker     Packs/day: 1.00     Types: Cigarettes   • Smokeless tobacco: Not on file   • Alcohol use Yes       (Not in a hospital admission)  Allergies:  Patient has no known allergies.  Prior to Admission medications    Medication Sig Start Date End Date Taking? Authorizing Provider   aspirin 81 MG chewable tablet Chew 81 mg Daily.    Nicola Vega MD   diltiaZEM (CARDIZEM) 60 MG tablet Take 30 mg by mouth 2 (Two) Times a Day. Patient will bring     Nicola Vega MD   gabapentin (NEURONTIN) 100 MG capsule Take 100 mg by mouth Daily.    Nicola Vega MD   glucose blood test strip USE TO TEST BLOOD SUGAR 3 TIMES A DAY 6/14/17   Nicola Vega MD    insulin aspart (novoLOG FLEXPEN) 100 UNIT/ML solution pen-injector sc pen Up 15u sq tid 2/23/17   Nicola Vega MD   Insulin Glargine (BASAGLAR KWIKPEN) 100 UNIT/ML injection pen INJECT 35 UNITS EVERY MORNING AND 40 UNITS EVERY EVENING 6/14/17   Nicola Vega MD   magnesium oxide (MAG-OX) 400 MG tablet Take 400 mg by mouth 2 (Two) Times a Day.    Nicola Vega MD   nicotine (NICODERM CQ) 14 MG/24HR patch Place 1 patch on the skin Daily. 7/24/17   Marcio Ortiz MD   simvastatin (ZOCOR) 10 MG tablet Take 10 mg by mouth Every Night.    Nicola Vega MD       Objective        Vital Signs  Temp:  [97.9 °F (36.6 °C)] 97.9 °F (36.6 °C)  Heart Rate:  [50-62] 50  Resp:  [16-20] 16  BP: (103-123)/(64-67) 109/64      Physical Exam   Constitutional: He is oriented to person, place, and time. He appears well-developed and well-nourished. No distress.   HENT:   Head: Normocephalic and atraumatic.   Eyes: Pupils are equal, round, and reactive to light. EOM are normal. No scleral icterus.   Neck: Normal range of motion. Neck supple. No JVD present. No thyromegaly present.   Cardiovascular: Normal rate, regular rhythm and normal heart sounds.  Exam reveals no gallop and no friction rub.    No murmur heard.  Pulmonary/Chest: Effort normal and breath sounds normal. He has no wheezes. He has no rales. He exhibits no tenderness.   He has reproducible pain on the left anterior chest wall.   Abdominal: Soft. Bowel sounds are normal. He exhibits no distension and no mass. There is no tenderness. There is no rebound and no guarding.   Musculoskeletal: He exhibits no edema, tenderness or deformity.   Neurological: He is alert and oriented to person, place, and time. No cranial nerve deficit. He exhibits normal muscle tone. Coordination normal.   Skin: Skin is warm and dry. No rash noted. He is not diaphoretic. No erythema. No pallor.   Psychiatric: He has a normal mood and affect. His behavior is normal.  Judgment and thought content normal.   Nursing note and vitals reviewed.        Results Review:       Results from last 7 days  Lab Units 08/08/18  1238   SODIUM mmol/L 139   POTASSIUM mmol/L 4.6   CHLORIDE mmol/L 105   CO2 mmol/L 27.0   BUN mg/dL 15   CREATININE mg/dL 0.88   GLUCOSE mg/dL 137*   CALCIUM mg/dL 9.0   BILIRUBIN mg/dL 0.3   ALK PHOS U/L 70   ALT (SGPT) U/L 32   AST (SGOT) U/L 34               Results from last 7 days  Lab Units 08/08/18  1238   WBC 10*3/mm3 10.11*   HEMOGLOBIN g/dL 14.0   HEMATOCRIT % 41.5   PLATELETS 10*3/mm3 236         Results from last 7 days  Lab Units 08/08/18  1238   INR  0.96     Imaging Results (last 7 days)     Procedure Component Value Units Date/Time    XR Chest 1 View [[id number]] Collected:  08/08/18 1220     Updated:  08/08/18 1238    Narrative:         PORTABLE CHEST    HISTORY: Chest pain    Portable AP upright film of the chest was obtained at 12:06 PM.  COMPARISON: July 1, 2016    EKG leads.  The lungs are clear of an acute process.  The heart is not enlarged.  The pulmonary vasculature is not increased.  No pleural effusion.  No pneumothorax.  No acute osseous abnormality.  Prior resection far lateral aspect of the right clavicle.  Surgical clips left upper quadrant of the abdomen.      Impression:       CONCLUSION:  No Acute Disease    [id number]    Electronically signed by:  Du Helton MD  8/8/2018 12:37 PM CDT  Workstation: "[email or web address]"          Assessment / Plan       Hospital Problem List:  Active Problems:   - Chest pain (likely atypical): Admit to telemetry to rule out MI due to the patient's risk factors and begin guidelines directed medical therapy.  Trend troponin, ECG, check lipid profile, echocardiogram and consult cardiologist.  - Diabetes mellitus: Continue anti-glycemic with Accu-Cheks and sliding scale insulin.  Check hemoglobin A1c.  - Nicotine dependence: Begin nicotine patch. Nicotine cessation counseling has been discussed with patient.  -  Hypertension: Continue her medications.  - Begin GI and DVT prophylaxis.  - Anticipate discharge in next 24-36 hours.      I discussed the patient's findings and my recommendations with patient.     Joseph Flores MD  08/08/18  2:51 PM        EMR Dragon/Transcription disclaimer:   Much of this encounter note is an electronic transcription/translation of spoken language to printed text. The electronic translation of spoken language may permit erroneous, or at times, nonsensical words or phrases to be inadvertently transcribed; Although I have reviewed the note for such errors, some may still exist.

## 2018-08-08 NOTE — ED PROVIDER NOTES
"Subjective   Patient presents to emergency department for chest pain x 1 week.  States pain feels like \"alot of pressure\" and recently worsening.  Patient states he left Dr Barnes's office due to \"being irritated with him\" after he had an echocardiogram performed in [place].  EF 60-65%.          History provided by:  Patient   used: No    Chest Pain   Pain location:  L chest  Pain quality: pressure    Pain radiates to:  Does not radiate  Pain severity:  Moderate  Onset quality:  Gradual  Duration:  7 days  Timing:  Constant  Progression:  Waxing and waning  Chronicity:  Recurrent  Context: at rest    Associated symptoms: fatigue and palpitations    Associated symptoms: no abdominal pain, no altered mental status, no anxiety, no back pain, no claudication, no cough, no diaphoresis, no dizziness, no dysphagia, no fever, no heartburn, no lower extremity edema, no nausea, no near-syncope, no numbness, no orthopnea, no PND, no shortness of breath, no syncope, no vomiting and no weakness    Risk factors: diabetes mellitus, high cholesterol, male sex and smoking    Risk factors: no coronary artery disease, no hypertension and no prior DVT/PE        Review of Systems   Constitutional: Positive for fatigue. Negative for chills, diaphoresis and fever.   HENT: Negative for sore throat and trouble swallowing.    Respiratory: Negative for cough and shortness of breath.    Cardiovascular: Positive for chest pain and palpitations. Negative for orthopnea, claudication, syncope, PND and near-syncope.   Gastrointestinal: Negative for abdominal pain, heartburn, nausea and vomiting.   Genitourinary: Negative for dysuria and flank pain.   Musculoskeletal: Negative for back pain.   Skin: Negative for color change.   Allergic/Immunologic: Negative for immunocompromised state.   Neurological: Negative for dizziness, weakness and numbness.   Hematological: Does not bruise/bleed easily.   Psychiatric/Behavioral: Negative " "for confusion.       Past Medical History:   Diagnosis Date   • Diabetes mellitus (CMS/HCC)    • Gout    • Hyperlipemia        No Known Allergies    Past Surgical History:   Procedure Laterality Date   • BACK SURGERY     • CHOLECYSTECTOMY     • COLONOSCOPY N/A 3/14/2017    Procedure: COLONOSCOPY;  Surgeon: Cachorro Perez DO;  Location: [facility] ENDOSCOPY;  Service:    • HAND SURGERY     • LEG SURGERY Right    • NOSE SURGERY     • PANCREAS SURGERY     • SPLENECTOMY         History reviewed. No pertinent family history.    Social History     Social History   • Marital status: Single     Social History Main Topics   • Smoking status: Current Every Day Smoker     Packs/day: 1.00     Types: Cigarettes   • Alcohol use Yes   • Drug use: No   • Sexual activity: Defer     Other Topics Concern   • Not on file           Objective      /65 (BP Location: Left arm, Patient Position: Lying)   Pulse 54   Temp 97.9 °F (36.6 °C) (Oral)   Resp 16   Ht 188 cm (74\")   Wt 102 kg (225 lb)   SpO2 98%   BMI 28.89 kg/m²     Physical Exam   Constitutional: He is oriented to person, place, and time. He appears well-nourished. No distress.   HENT:   Head: Normocephalic and atraumatic.   Eyes: Conjunctivae are normal.   Cardiovascular: Normal rate, regular rhythm and normal heart sounds.    Pulmonary/Chest: Effort normal and breath sounds normal. No respiratory distress. He has no wheezes.   Abdominal: Soft. He exhibits no distension. There is no tenderness.   Musculoskeletal: He exhibits no edema.   Neurological: He is alert and oriented to person, place, and time.   Skin: Skin is warm. Capillary refill takes less than 2 seconds.   Psychiatric: He has a normal mood and affect. His behavior is normal. Thought content normal.   Nursing note and vitals reviewed.      ECG 12 Lead    Date/Time: 8/8/2018 12:19 PM  Performed by: ANDREA RAMÍREZ  Authorized by: ANDREA RAMÍREZ   Interpreted by physician  Comparison: compared " with previous ECG from 7/1/2016  Similar to previous ECG  Rhythm: sinus rhythm  Rate: normal  BPM: 62  Conduction: incomplete RBBB  ST Segments: ST segments normal  Clinical impression: non-specific ECG                 ED Course  ED Course as of Aug 08 1428   Wed Aug 08, 2018   1428 Patient admitted to hospitalist.  [MARIS]      ED Course User Index  [MARIS] Ji Rebolledo PA-C      Results for orders placed or performed during the hospital encounter of 08/08/18   Troponin   Result Value Ref Range    Troponin I <0.012 <=0.034 ng/mL   Comprehensive Metabolic Panel   Result Value Ref Range    Glucose 137 (H) 60 - 100 mg/dL    BUN 15 7 - 21 mg/dL    Creatinine 0.88 0.70 - 1.30 mg/dL    Sodium 139 137 - 145 mmol/L    Potassium 4.6 3.5 - 5.1 mmol/L    Chloride 105 95 - 110 mmol/L    CO2 27.0 22.0 - 31.0 mmol/L    Calcium 9.0 8.4 - 10.2 mg/dL    Total Protein 7.4 6.3 - 8.6 g/dL    Albumin 4.30 3.40 - 4.80 g/dL    ALT (SGPT) 32 21 - 72 U/L    AST (SGOT) 34 17 - 59 U/L    Alkaline Phosphatase 70 38 - 126 U/L    Total Bilirubin 0.3 0.2 - 1.3 mg/dL    eGFR Non  Amer 89 56 - 130 mL/min/1.73    Globulin 3.1 2.3 - 3.5 gm/dL    A/G Ratio 1.4 1.1 - 1.8 g/dL    BUN/Creatinine Ratio 17.0 7.0 - 25.0    Anion Gap 7.0 5.0 - 15.0 mmol/L   BNP   Result Value Ref Range    proBNP 33.5 0.0 - 900.0 pg/mL   Protime-INR   Result Value Ref Range    Protime 12.6 11.1 - 15.3 Seconds    INR 0.96 0.80 - 1.20   CBC Auto Differential   Result Value Ref Range    WBC 10.11 (H) 3.20 - 9.80 10*3/mm3    RBC 4.87 4.37 - 5.74 10*6/mm3    Hemoglobin 14.0 13.7 - 17.3 g/dL    Hematocrit 41.5 39.0 - 49.0 %    MCV 85.2 80.0 - 98.0 fL    MCH 28.7 26.5 - 34.0 pg    MCHC 33.7 31.5 - 36.3 g/dL    RDW 13.4 11.5 - 14.5 %    RDW-SD 41.9 35.1 - 43.9 fl    MPV 11.5 8.0 - 12.0 fL    Platelets 236 150 - 450 10*3/mm3    Neutrophil % 41.6 37.0 - 80.0 %    Lymphocyte % 44.0 10.0 - 50.0 %    Monocyte % 8.8 0.0 - 12.0 %    Eosinophil % 4.5 0.0 - 7.0 %    Basophil % 0.8  0.0 - 2.0 %    Immature Grans % 0.3 0.0 - 0.5 %    Neutrophils, Absolute 4.20 2.00 - 8.60 10*3/mm3    Lymphocytes, Absolute 4.45 (H) 0.60 - 4.20 10*3/mm3    Monocytes, Absolute 0.89 0.00 - 0.90 10*3/mm3    Eosinophils, Absolute 0.46 0.00 - 0.70 10*3/mm3    Basophils, Absolute 0.08 0.00 - 0.20 10*3/mm3    Immature Grans, Absolute 0.03 (H) 0.00 - 0.02 10*3/mm3   Light Blue Top   Result Value Ref Range    Extra Tube hold for add-on    Green Top (Gel)   Result Value Ref Range    Extra Tube Hold for add-ons.    Lavender Top   Result Value Ref Range    Extra Tube hold for add-on    Gold Top - SST   Result Value Ref Range    Extra Tube Hold for add-ons.      Xr Chest 1 View    Result Date: 8/8/2018  Narrative: PORTABLE CHEST HISTORY: Chest pain Portable AP upright film of the chest was obtained at 12:06 PM. COMPARISON: July 1, 2016 EKG leads. The lungs are clear of an acute process. The heart is not enlarged. The pulmonary vasculature is not increased. No pleural effusion. No pneumothorax. No acute osseous abnormality. Prior resection far lateral aspect of the right clavicle. Surgical clips left upper quadrant of the abdomen.     Impression: CONCLUSION: No Acute Disease [id number] Electronically signed by:  Du Helton MD  8/8/2018 12:37 PM CDT Workstation: [employer]              HEART Score (for prediction of 6-week risk of major adverse cardiac event) reviewed and/or performed as part of the patient evaluation and treatment planning process.  The result associated with this review/performance is: 4       MDM      Final diagnoses:   Chest pain, unspecified type            Ji Rebolledo PA-C  08/08/18 1423

## 2018-08-09 ENCOUNTER — APPOINTMENT (OUTPATIENT)
Dept: ULTRASOUND IMAGING | Facility: HOSPITAL | Age: 58
End: 2018-08-09

## 2018-08-09 ENCOUNTER — APPOINTMENT (OUTPATIENT)
Dept: CARDIOLOGY | Facility: HOSPITAL | Age: 58
End: 2018-08-09
Attending: INTERNAL MEDICINE

## 2018-08-09 PROBLEM — R53.82 CHRONIC FATIGUE: Status: ACTIVE | Noted: 2018-08-09

## 2018-08-09 PROBLEM — R06.09 DYSPNEA ON EXERTION: Status: ACTIVE | Noted: 2018-08-09

## 2018-08-09 PROBLEM — I73.9 PVD (PERIPHERAL VASCULAR DISEASE) WITH CLAUDICATION: Status: ACTIVE | Noted: 2018-08-09

## 2018-08-09 LAB
ANION GAP SERPL CALCULATED.3IONS-SCNC: 5 MMOL/L (ref 5–15)
ARTICHOKE IGE QN: 66 MG/DL (ref 1–129)
BUN BLD-MCNC: 17 MG/DL (ref 7–21)
BUN/CREAT SERPL: 21.5 (ref 7–25)
CALCIUM SPEC-SCNC: 8.8 MG/DL (ref 8.4–10.2)
CHLORIDE SERPL-SCNC: 109 MMOL/L (ref 95–110)
CHOLEST SERPL-MCNC: 126 MG/DL (ref 0–199)
CO2 SERPL-SCNC: 28 MMOL/L (ref 22–31)
CREAT BLD-MCNC: 0.79 MG/DL (ref 0.7–1.3)
DEPRECATED RDW RBC AUTO: 43.4 FL (ref 35.1–43.9)
EOSINOPHIL # BLD MANUAL: 0.63 10*3/MM3 (ref 0–0.7)
EOSINOPHIL NFR BLD MANUAL: 5 % (ref 0–7)
ERYTHROCYTE [DISTWIDTH] IN BLOOD BY AUTOMATED COUNT: 13.6 % (ref 11.5–14.5)
GFR SERPL CREATININE-BSD FRML MDRD: 100 ML/MIN/1.73 (ref 56–130)
GLUCOSE BLD-MCNC: 69 MG/DL (ref 60–100)
GLUCOSE BLDC GLUCOMTR-MCNC: 160 MG/DL (ref 70–130)
GLUCOSE BLDC GLUCOMTR-MCNC: 201 MG/DL (ref 70–130)
GLUCOSE BLDC GLUCOMTR-MCNC: 50 MG/DL (ref 70–130)
GLUCOSE BLDC GLUCOMTR-MCNC: 65 MG/DL (ref 70–130)
GLUCOSE BLDC GLUCOMTR-MCNC: 85 MG/DL (ref 70–130)
HCT VFR BLD AUTO: 38.3 % (ref 39–49)
HDLC SERPL-MCNC: 41 MG/DL (ref 60–200)
HGB BLD-MCNC: 12.9 G/DL (ref 13.7–17.3)
IRON 24H UR-MRATE: 74 MCG/DL (ref 49–181)
IRON SATN MFR SERPL: 21 % (ref 20–55)
LDLC/HDLC SERPL: 1.71 {RATIO} (ref 0–3.55)
LYMPHOCYTES # BLD MANUAL: 5.4 10*3/MM3 (ref 0.6–4.2)
LYMPHOCYTES NFR BLD MANUAL: 43 % (ref 10–50)
LYMPHOCYTES NFR BLD MANUAL: 9 % (ref 0–12)
MCH RBC QN AUTO: 29.2 PG (ref 26.5–34)
MCHC RBC AUTO-ENTMCNC: 33.7 G/DL (ref 31.5–36.3)
MCV RBC AUTO: 86.7 FL (ref 80–98)
MONOCYTES # BLD AUTO: 1.13 10*3/MM3 (ref 0–0.9)
NEUTROPHILS # BLD AUTO: 5.4 10*3/MM3 (ref 2–8.6)
NEUTROPHILS NFR BLD MANUAL: 42 % (ref 37–80)
NEUTS BAND NFR BLD MANUAL: 1 % (ref 0–5)
PLATELET # BLD AUTO: 232 10*3/MM3 (ref 150–450)
PMV BLD AUTO: 11.8 FL (ref 8–12)
POTASSIUM BLD-SCNC: 4 MMOL/L (ref 3.5–5.1)
RBC # BLD AUTO: 4.42 10*6/MM3 (ref 4.37–5.74)
RBC MORPH BLD: NORMAL
SMALL PLATELETS BLD QL SMEAR: ADEQUATE
SODIUM BLD-SCNC: 142 MMOL/L (ref 137–145)
TIBC SERPL-MCNC: 349 MCG/DL (ref 261–462)
TRIGL SERPL-MCNC: 75 MG/DL (ref 20–199)
TROPONIN I SERPL-MCNC: <0.012 NG/ML
TROPONIN I SERPL-MCNC: <0.012 NG/ML
WBC MORPH BLD: NORMAL
WBC NRBC COR # BLD: 12.55 10*3/MM3 (ref 3.2–9.8)

## 2018-08-09 PROCEDURE — G0378 HOSPITAL OBSERVATION PER HR: HCPCS

## 2018-08-09 PROCEDURE — 84484 ASSAY OF TROPONIN QUANT: CPT | Performed by: INTERNAL MEDICINE

## 2018-08-09 PROCEDURE — 63710000001 INSULIN DETEMIR PER 5 UNITS: Performed by: INTERNAL MEDICINE

## 2018-08-09 PROCEDURE — 82962 GLUCOSE BLOOD TEST: CPT

## 2018-08-09 PROCEDURE — 63710000001 INSULIN ASPART PER 5 UNITS: Performed by: INTERNAL MEDICINE

## 2018-08-09 PROCEDURE — 80048 BASIC METABOLIC PNL TOTAL CA: CPT | Performed by: INTERNAL MEDICINE

## 2018-08-09 PROCEDURE — 80061 LIPID PANEL: CPT | Performed by: INTERNAL MEDICINE

## 2018-08-09 PROCEDURE — 25010000002 ENOXAPARIN PER 10 MG: Performed by: INTERNAL MEDICINE

## 2018-08-09 PROCEDURE — 93880 EXTRACRANIAL BILAT STUDY: CPT

## 2018-08-09 PROCEDURE — 99243 OFF/OP CNSLTJ NEW/EST LOW 30: CPT | Performed by: NURSE PRACTITIONER

## 2018-08-09 PROCEDURE — 85007 BL SMEAR W/DIFF WBC COUNT: CPT | Performed by: INTERNAL MEDICINE

## 2018-08-09 PROCEDURE — 96372 THER/PROPH/DIAG INJ SC/IM: CPT

## 2018-08-09 PROCEDURE — 96361 HYDRATE IV INFUSION ADD-ON: CPT

## 2018-08-09 PROCEDURE — 85027 COMPLETE CBC AUTOMATED: CPT | Performed by: INTERNAL MEDICINE

## 2018-08-09 RX ORDER — LOSARTAN POTASSIUM 25 MG/1
12.5 TABLET ORAL
Status: DISCONTINUED | OUTPATIENT
Start: 2018-08-10 | End: 2018-08-10 | Stop reason: HOSPADM

## 2018-08-09 RX ADMIN — GABAPENTIN 100 MG: 100 CAPSULE ORAL at 09:36

## 2018-08-09 RX ADMIN — INSULIN DETEMIR 35 UNITS: 100 INJECTION, SOLUTION SUBCUTANEOUS at 21:03

## 2018-08-09 RX ADMIN — NICOTINE 1 PATCH: 14 PATCH, EXTENDED RELEASE TRANSDERMAL at 17:12

## 2018-08-09 RX ADMIN — ASPIRIN 81 MG 81 MG: 81 TABLET ORAL at 09:36

## 2018-08-09 RX ADMIN — INSULIN DETEMIR 35 UNITS: 100 INJECTION, SOLUTION SUBCUTANEOUS at 09:38

## 2018-08-09 RX ADMIN — ENOXAPARIN SODIUM 40 MG: 100 INJECTION SUBCUTANEOUS at 17:13

## 2018-08-09 RX ADMIN — ATORVASTATIN CALCIUM 10 MG: 10 TABLET, FILM COATED ORAL at 09:36

## 2018-08-09 RX ADMIN — INSULIN ASPART 3 UNITS: 100 INJECTION, SOLUTION INTRAVENOUS; SUBCUTANEOUS at 13:12

## 2018-08-09 RX ADMIN — SODIUM CHLORIDE 100 ML/HR: 9 INJECTION, SOLUTION INTRAVENOUS at 02:53

## 2018-08-09 NOTE — NURSING NOTE
Patients blood sugar was 50. Patient is alert and oriented. Gave patient orange juice, peanut butter and crackers, and ice cream. FSBS recheck at 1705 is 65.

## 2018-08-09 NOTE — PLAN OF CARE
Problem: Patient Care Overview  Goal: Plan of Care Review  Outcome: Ongoing (interventions implemented as appropriate)   08/09/18 0434   Coping/Psychosocial   Plan of Care Reviewed With patient   Plan of Care Review   Progress improving   OTHER   Outcome Summary No episodes of chest pain, VSS, NPO for lipid panel in AM, resting throughout the night, continue to monitor     Goal: Individualization and Mutuality  Outcome: Ongoing (interventions implemented as appropriate)      Problem: Cardiac: ACS (Acute Coronary Syndrome) (Adult)  Goal: Signs and Symptoms of Listed Potential Problems Will be Absent, Minimized or Managed (Cardiac: ACS)  Outcome: Ongoing (interventions implemented as appropriate)

## 2018-08-09 NOTE — CONSULTS
"    [facility] [place] [facility] [place]    Referring Provider: Nelson Brand MD    Reason for Consultation: CP     Patient Care Team:  Guanako Atkins MD as PCP - General  Marcio Ortiz MD as Consulting Physician (Hematology and Oncology)  Tresa Yo APRN as Nurse Practitioner (Oncology)    Chief complaint   Chief Complaint   Patient presents with   • Chest Pain       Subjective .     History of present illness:   Patient presents to emergency department for chest pain x 1 week.  States pain feels like \"alot of pressure\" and recently worsening.  Patient states he left Dr Barnes's office 8/7/18 without being seen due to the long wait time. He is interested in establishing with Dr. Little, who his wife sees.   Last office visit was 4/27/18 for which chest pain was a complaint. Echo and EKG was preformed. Plan was to consider TST should symptoms continue.     Chest Pain   Pain location:  L chest  Pain quality: pressure    Pain radiates to:  Does not radiate  Pain severity:  Moderate  Onset quality:  Gradual  Duration:  7 days  Timing:  Constant  Progression:  Waxing and waning  Chronicity:  Recurrent  Context: at rest    Associated symptoms: fatigue and palpitations    Associated symptoms: no abdominal pain, no altered mental status, no anxiety, no back pain, no claudication, no cough, no diaphoresis, no dizziness, no dysphagia, no fever, no heartburn, no lower extremity edema, no nausea, no near-syncope, no numbness, no orthopnea, no PND, no shortness of breath, no syncope, no vomiting and no weakness    Risk factors: diabetes mellitus, high cholesterol, male sex and smoking    Risk factors: no coronary artery disease, no hypertension and no prior DVT/PE      Ongoing chest pressure and fatigue. He does have claudication symptoms worse in RLE and hip. This limits his activity. His breathing also limits his activity. He \"just wants to feel better\". He has not had a stress recently. He had left heart cath in 2014 with " Dr. Navarro that did not show obstructive CAD. He is a smoker and diabetic on insulin. I am concerned for PVD and COPD. Echo reviewed, there is no evidence of abnormality or CHF.       The 10-year CVD risk score (OSVALDO'Agostino, et al., 2008) is: 21.3%    Values used to calculate the score:      Age: 57 years      Sex: Male      Diabetic: Yes      Tobacco smoker: Yes      Systolic Blood Pressure: 106 mmHg      Is BP treated: No      HDL Cholesterol: 41 mg/dL      Total Cholesterol: 126 mg/dL  The ASCVD Risk score ([place]jeanne DOBSON Jr., et al., 2013) failed to calculate for the following reasons:    The valid total cholesterol range is 130 to 320 mg/dL    Review of Systems  Review of Systems   Constitutional: Positive for fatigue. Negative for diaphoresis, fever and unexpected weight change.   Respiratory: Positive for chest tightness and shortness of breath. Negative for cough and wheezing.    Cardiovascular: Negative for chest pain, palpitations and leg swelling.   Gastrointestinal: Positive for blood in stool and diarrhea. Negative for abdominal distention.   Genitourinary: Negative for hematuria.   Musculoskeletal: Negative for arthralgias and myalgias.   Skin: Negative for pallor and rash.   Neurological: Negative for dizziness, syncope and weakness.   Hematological: Does not bruise/bleed easily.   Psychiatric/Behavioral: Negative for confusion. The patient is not nervous/anxious.        History  Past Medical History:   Diagnosis Date   • Diabetes mellitus (CMS/HCC)    • Gout    • Hyperlipemia    , Past Surgical History:   Procedure Laterality Date   • BACK SURGERY     • CHOLECYSTECTOMY     • COLONOSCOPY N/A 3/14/2017    Procedure: COLONOSCOPY;  Surgeon: Cachorro Perez DO;  Location: [facility] ENDOSCOPY;  Service:    • HAND SURGERY     • LEG SURGERY Right    • NOSE SURGERY     • PANCREAS SURGERY     • SPLENECTOMY     , History reviewed. No pertinent family history., Social History   Substance Use Topics   • Smoking status:  "Current Every Day Smoker     Packs/day: 1.00     Types: Cigarettes   • Smokeless tobacco: Not on file   • Alcohol use Yes   , Prescriptions Prior to Admission   Medication Sig Dispense Refill Last Dose   • aspirin 81 MG chewable tablet Chew 81 mg Daily.   Taking   • diltiaZEM (CARDIZEM) 30 MG tablet Take 30 mg by mouth 2 (Two) Times a Day. Patient will bring    Taking   • GABAPENTIN PO Take 200 mg by mouth Every Night.   Taking   • Insulin Glargine (BASAGLAR KWIKPEN) 100 UNIT/ML injection pen INJECT 40 UNITS EVERY MORNING AND 45 UNITS EVERY EVENING  5 Taking   • magnesium oxide (MAG-OX) 400 MG tablet Take 400 mg by mouth 2 (Two) Times a Day.   Taking   • meloxicam (MOBIC) 15 MG tablet Take 15 mg by mouth Daily.      • nicotine (NICODERM CQ) 14 MG/24HR patch Place 1 patch on the skin Daily. 15 patch 2 Not Taking   • simvastatin (ZOCOR) 10 MG tablet Take 10 mg by mouth Every Night.   Taking   • tamsulosin (FLOMAX) 0.4 MG capsule 24 hr capsule Take 1 capsule by mouth Daily.      • glucose blood test strip USE TO TEST BLOOD SUGAR 3 TIMES A DAY   Taking   • insulin aspart (novoLOG FLEXPEN) 100 UNIT/ML solution pen-injector sc pen Up 15u sq tid   Taking      ALLERGIES  Patient has no known allergies.    Objective     Vital Sign Min/Max for last 24 hours  Temp  Min: 96.7 °F (35.9 °C)  Max: 97.9 °F (36.6 °C)   BP  Min: 103/65  Max: 123/67   Pulse  Min: 50  Max: 86   Resp  Min: 16  Max: 20   SpO2  Min: 94 %  Max: 100 %   No Data Recorded   Weight  Min: 102 kg (225 lb)  Max: 102 kg (225 lb)     Flowsheet Rows      First Filed Value   Admission Height  188 cm (74\") Documented at 08/08/2018 1209   Admission Weight  102 kg (225 lb) Documented at 08/08/2018 1209           Physical Exam:  Physical Exam   Constitutional: He is oriented to person, place, and time. He appears well-developed and well-nourished. No distress.   HENT:   Head: Normocephalic.   Eyes: Conjunctivae are normal.   Neck: No JVD present.   Cardiovascular: " Regular rhythm, S1 normal, S2 normal and normal heart sounds.  Bradycardia present.  Exam reveals decreased pulses. Exam reveals no gallop and no friction rub.    No murmur heard.  Pulses:       Dorsalis pedis pulses are 1+ on the right side, and 1+ on the left side.        Posterior tibial pulses are 1+ on the right side, and 3+ on the left side.   Pulmonary/Chest: Effort normal. No respiratory distress. He has decreased breath sounds in the right lower field and the left lower field. He has no wheezes. He has no rales.   Abdominal: Soft. Bowel sounds are normal. He exhibits no distension.   Musculoskeletal: Normal range of motion. He exhibits no edema.   Neurological: He is alert and oriented to person, place, and time.   Skin: Skin is warm and dry. He is not diaphoretic. No erythema.   Psychiatric: He has a normal mood and affect. His behavior is normal. Judgment and thought content normal.   Nursing note and vitals reviewed.       Results Review:     Results from last 7 days  Lab Units 08/09/18  0507 08/08/18  1238   SODIUM mmol/L 142 139   POTASSIUM mmol/L 4.0 4.6   CHLORIDE mmol/L 109 105   CO2 mmol/L 28.0 27.0   BUN mg/dL 17 15   CREATININE mg/dL 0.79 0.88   CALCIUM mg/dL 8.8 9.0   BILIRUBIN mg/dL  --  0.3   ALK PHOS U/L  --  70   ALT (SGPT) U/L  --  32   AST (SGOT) U/L  --  34   GLUCOSE mg/dL 69 137*     Estimated Creatinine Clearance: 128.3 mL/min (by C-G formula based on SCr of 0.79 mg/dL).      Results from last 7 days  Lab Units 08/09/18  0507 08/08/18  1238   WBC 10*3/mm3 12.55* 10.11*   HEMOGLOBIN g/dL 12.9* 14.0   PLATELETS 10*3/mm3 232 236       Results from last 7 days  Lab Units 08/08/18  1238   INR  0.96       Lab Results   Component Value Date    CKTOTAL 52 (L) 07/02/2016    CKMB 0.6 07/02/2016    TROPONINI <0.012 08/09/2018     Lab Results   Component Value Date    PROBNP 33.5 08/08/2018       No intake/output data recorded.    Cardiographics  ECHO 7/26/18  Findings       Study Quality:  The  study quality is good.      Left Ventricle:  The left ventricular chamber size is normal. There is no left  ventricular hypertrophy.   There is normal left ventricular systolic  function. The estimated ejection fraction is 60-65%. Normal left  ventricular diastolic filling is observed.     Left Atrium:  The left atrial chamber size is normal.     Right Ventricle:  The right ventricular cavity size is normal. The right ventricular  global systolic function is normal.     Right Atrium:  The right atrial cavity size is normal.     Aortic Valve:  The aortic valve leaflets are mildly thickened. There is no evidence of  aortic stenosis. There is no evidence of aortic regurgitation.     Mitral Valve:  The mitral valve leaflets are mildly thickened. There is mild mitral  regurgitation.      Tricuspid Valve:  The tricuspid valve leaflets are mildly thickened. There is mild  tricuspid regurgitation.     Pulmonic Valve:  The pulmonic valve appears normal. There is trace pulmonic regurgitation  present.     Pericardium:  There is no pericardial effusion.     Aorta:  There is no dilatation of the ascending aorta.     Venous:  The inferior vena cava appears normal in size.     Conclusions  There is normal left ventricular systolic function.  The estimated ejection fraction is 60-65%.  Normal left ventricular diastolic filling is observed.      [facility] [place] 9/19/2014  HEMODYNAMICS: 1. Aortic pressure 118/74 with mean of 93. 2. Left ventricular pressure 118/18. LEFT VENTRICULOGRAM: Showed preserved left ventricular systolic function with an estimated ejection fraction of 55% to 60%. CORONARIES: Right dominant system - 1. LEFT MAIN coronary artery was free of any disease. 2. LEFT ANTERIOR DESCENDING artery was a medium caliber vessel. Towards the ostium had minimal plaquing with luminal irregularity. The diagonal branch had evidence of luminal irregularity. There was minimal plaquing in the midportion of the left anterior descending artery.  There was evidence of good STEVEN-3 flow down the distal vessel. 3. CIRCUMFLEX artery was a medium caliber vessel which was free of any obstructive stenosis with good STEVEN-3 flow. 4. RIGHT CORONARY artery was a medium caliber vessel, which was free of any obstructive stenosis with good STEVEN-3 flow. FINAL IMPRESSION: Nonobstructive disease in the ostium of the left anterior descending artery with preserved left ventricular systolic function with an ejection fraction of 55%. RECOMMENDATIONS: To work up noncardiac etiology of chest pain and aggressive risk factor modification. The arterial sheaths were pulled and closed with an Angio-Seal closure device. The patient was transferred to the floor in stable condition. AILYN NAVARRO MD     EKG 4/27/18  EXAM DATE/TIME:  Apr 27 2018 11:20:57  Test Reason :   Blood Pressure : Not Indicated mmHG  Vent. Rate : 077 BPM     Atrial Rate : 077 BPM     P-R Int : 152 ms          QRS Dur : 098 ms      QT Int : 366 ms       P-R-T Axes : 061 028 039 degrees     QTc Int : 414 ms    Normal sinus rhythm  Normal ECG  No previous ECGs available  Confirmed by Felix Rodrigues MD ([id number]) on 4/27/2018 11:46:45 AM    Referred By: BARI MCALLISTER           Confirmed By:Felix Rodrigues MD    Xr Chest 1 View    Result Date: 8/8/2018  CONCLUSION: No Acute Disease [id number] Electronically signed by:  Du Helton MD  8/8/2018 12:37 PM CDT Workstation: [email or web address]      Intake/Output     None          Assessment/Plan     Principal Problem:    Chest pain  Chest pain syndrome. Pain characteristic: atypical angina   Patient is Moderate Risk.   Ischemic evaluation:ordered.  The patient is not able to exercise. EKG is Normal. Troponin negative x 4  Risks/Benefits discussed of intermediate risk of nuclear and the possibility of LHC.   Ischemia evaluation with Nuclear stress test in the AM. Normal LHC in 9/19/14 with Dr. Navarro.     - Stop Cardizem and begin Losartan for renal protection and HTN treatment.     Active  "Problems:      Dyspnea on exertion  - PFTs  - Most likely undiagnosed COPD. He had \"lung testing\" before and had to retire from work because they were poor. He has never been treated or had inhalers  - BNP negative and echo WNL      Chronic fatigue  - Iron Profile  - discontinue Cardizem as he is bradycardic. The cardizem was started many years ago due to his chest pressure      PVD (peripheral vascular disease) with claudication (CMS/HCC)  - diminished pulses in RLE. Monophasic DP on doppler.  - strong left PT pulse. Diminished DP.   - ABIs as outpatient  - Carotid ultrasound   - ASA, Statin      I discussed the patients findings and my recommendations with patient and primary care team and Dr. Pierson          This document has been electronically signed by NIKIA Stokes on August 9, 2018 8:29 AM                "

## 2018-08-09 NOTE — PROGRESS NOTES
[facility] Services  INPATIENT PROGRESS NOTE    Length of Stay: 0  Date of Admission: 8/8/2018  Primary Care Physician: Guanako Atkins MD    Subjective   Chief Complaint: No new complaints.  HPI:  Patient is seen for follow-up today.  He is chest pain-free and has had serial negative cardiac markers.  Periodic claudication but legs have been reported.    Review of Systems   Constitutional: Negative for activity change, appetite change, chills, diaphoresis, fatigue and fever.   HENT: Negative for trouble swallowing and voice change.    Eyes: Negative for photophobia and visual disturbance.   Respiratory: Negative for cough, choking, chest tightness, shortness of breath, wheezing and stridor.    Cardiovascular: Negative for chest pain, palpitations and leg swelling.   Gastrointestinal: Negative for abdominal distention, abdominal pain, blood in stool, constipation, diarrhea, nausea and vomiting.   Endocrine: Negative for cold intolerance, heat intolerance, polydipsia, polyphagia and polyuria.   Genitourinary: Negative for decreased urine volume, difficulty urinating, dysuria, enuresis, flank pain, frequency, hematuria and urgency.   Musculoskeletal: Negative for arthralgias, gait problem, myalgias, neck pain and neck stiffness.   Skin: Negative for pallor, rash and wound.   Neurological: Negative for dizziness, tremors, seizures, syncope, facial asymmetry, speech difficulty, weakness, light-headedness, numbness and headaches.   Hematological: Does not bruise/bleed easily.   Psychiatric/Behavioral: Negative for agitation, behavioral problems and confusion.       Objective    Temp:  [96.7 °F (35.9 °C)-97.9 °F (36.6 °C)] 97.6 °F (36.4 °C)  Heart Rate:  [50-86] 66  Resp:  [16-20] 18  BP: (103-123)/(59-74) 121/74    Physical Exam   Constitutional: He is oriented to person, place, and time. He appears well-developed and well-nourished. No distress.   HENT:   Head:  Normocephalic and atraumatic.   Eyes: Pupils are equal, round, and reactive to light. EOM are normal. No scleral icterus.   Neck: Normal range of motion. Neck supple. No JVD present. No thyromegaly present.   Cardiovascular: Normal rate, regular rhythm and normal heart sounds.  Exam reveals no gallop and no friction rub.    No murmur heard.  Pulmonary/Chest: Effort normal and breath sounds normal. He has no wheezes. He has no rales. He exhibits no tenderness.   Abdominal: Soft. Bowel sounds are normal. He exhibits no distension and no mass. There is no tenderness. There is no rebound and no guarding.   Musculoskeletal: He exhibits no edema, tenderness or deformity.   Neurological: He is alert and oriented to person, place, and time. No cranial nerve deficit. He exhibits normal muscle tone. Coordination normal.   Skin: Skin is warm and dry. No rash noted. He is not diaphoretic. No erythema. No pallor.   Psychiatric: He has a normal mood and affect. His behavior is normal. Judgment and thought content normal.   Nursing note and vitals reviewed.        Medication Review:    Current Facility-Administered Medications:   •  acetaminophen (TYLENOL) tablet 650 mg, 650 mg, Oral, Q4H PRN, Joseph Flores MD  •  aluminum-magnesium hydroxide-simethicone (MAALOX MAX) 400-400-40 MG/5ML suspension 15 mL, 15 mL, Oral, Q6H PRN, Joseph Flores MD  •  aspirin chewable tablet 81 mg, 81 mg, Oral, Daily, Joseph Flores MD, 81 mg at 08/09/18 0936  •  atorvastatin (LIPITOR) tablet 10 mg, 10 mg, Oral, Daily, Joseph Flores MD, 10 mg at 08/09/18 0936  •  bisacodyl (DULCOLAX) EC tablet 5 mg, 5 mg, Oral, Daily PRN, Joseph Flores MD  •  dextrose (D50W) solution 25 g, 25 g, Intravenous, Q15 Min PRN, Joseph Flores MD  •  dextrose (GLUTOSE) oral gel 15 g, 15 g, Oral, Q15 Min PRN, Joseph Flores MD  •  enoxaparin (LOVENOX) syringe 40 mg, 40 mg, Subcutaneous, Q24H, Joseph Flores MD, 40 mg at 08/08/18 1736  •   gabapentin (NEURONTIN) capsule 100 mg, 100 mg, Oral, Daily, Joseph Flores MD, 100 mg at 08/09/18 0936  •  glucagon (human recombinant) (GLUCAGEN DIAGNOSTIC) injection 1 mg, 1 mg, Subcutaneous, PRN, Joseph Flores MD  •  insulin aspart (novoLOG) injection 0-14 Units, 0-14 Units, Subcutaneous, 4x Daily AC & at Bedtime, Joseph Flores MD, 12 Units at 08/08/18 2051  •  insulin aspart (novoLOG) injection 15 Units, 15 Units, Subcutaneous, TID With Meals, Joseph Flores MD  •  insulin detemir (LEVEMIR) injection 35 Units, 35 Units, Subcutaneous, Q12H, Joseph Flores MD, 35 Units at 08/09/18 0938  •  ipratropium-albuterol (DUO-NEB) nebulizer solution 3 mL, 3 mL, Nebulization, Q6H PRN, Joseph Flores MD  •  [START ON 8/10/2018] losartan (COZAAR) half tablet 12.5 mg, 12.5 mg, Oral, Q24H, Ana Sandy APRN  •  morphine injection 1 mg, 1 mg, Intravenous, Q4H PRN **AND** naloxone (NARCAN) injection 0.4 mg, 0.4 mg, Intravenous, Q5 Min PRN, Joseph Flores MD  •  nicotine (NICODERM CQ) 14 MG/24HR patch 1 patch, 1 patch, Transdermal, Q24H, Joseph Flores MD, 1 patch at 08/08/18 1738  •  ondansetron (ZOFRAN) injection 4 mg, 4 mg, Intravenous, Q6H PRN, Joseph Flores MD  •  sodium chloride 0.9 % flush 1-10 mL, 1-10 mL, Intravenous, PRN, Joseph Flores MD  •  sodium chloride 0.9 % flush 10 mL, 10 mL, Intravenous, PRN, Ji Rebolledo PA-C    Results Review:  I have reviewed the labs, radiology results, and diagnostic studies.    Laboratory Data:     Results from last 7 days  Lab Units 08/09/18  0507 08/08/18  1238   SODIUM mmol/L 142 139   POTASSIUM mmol/L 4.0 4.6   CHLORIDE mmol/L 109 105   CO2 mmol/L 28.0 27.0   BUN mg/dL 17 15   CREATININE mg/dL 0.79 0.88   GLUCOSE mg/dL 69 137*   CALCIUM mg/dL 8.8 9.0   BILIRUBIN mg/dL  --  0.3   ALK PHOS U/L  --  70   ALT (SGPT) U/L  --  32   AST (SGOT) U/L  --  34   ANION GAP mmol/L 5.0 7.0     Estimated Creatinine Clearance: 128.3  mL/min (by C-G formula based on SCr of 0.79 mg/dL).            Results from last 7 days  Lab Units 08/09/18  0507 08/08/18  1238   WBC 10*3/mm3 12.55* 10.11*   HEMOGLOBIN g/dL 12.9* 14.0   HEMATOCRIT % 38.3* 41.5   PLATELETS 10*3/mm3 232 236       Results from last 7 days  Lab Units 08/08/18  1238   INR  0.96       Culture Data:   No results found for: BLOODCX  No results found for: URINECX  No results found for: RESPCX  No results found for: WOUNDCX  No results found for: STOOLCX  No components found for: BODYFLD    Radiology Data:   Imaging Results (last 24 hours)     Procedure Component Value Units Date/Time    XR Chest 1 View [[id number]] Collected:  08/08/18 1220     Updated:  08/08/18 1238    Narrative:         PORTABLE CHEST    HISTORY: Chest pain    Portable AP upright film of the chest was obtained at 12:06 PM.  COMPARISON: July 1, 2016    EKG leads.  The lungs are clear of an acute process.  The heart is not enlarged.  The pulmonary vasculature is not increased.  No pleural effusion.  No pneumothorax.  No acute osseous abnormality.  Prior resection far lateral aspect of the right clavicle.  Surgical clips left upper quadrant of the abdomen.      Impression:       CONCLUSION:  No Acute Disease    [id number]    Electronically signed by:  Du Helton MD  8/8/2018 12:37 PM CDT  Workstation: [email or web address]          I have reviewed the patient's current medications.     Assessment/Plan     Hospital Problem List:  Principal Problem:   - Chest pain: Continue current guidelines directed medical therapy.  Patient did have echocardiogram in [place] about 3 weeks ago that showed an ejection fraction of greater than 60%.  He is scheduled for stress testing in the a.m.  Cardiologist is following.    - Diabetes mellitus: Stable.  Continue anti-glycemic with Accu-Cheks and sliding scale insulin.  - Hypertension: Stable.  - Nicotine dependence/ possible COPD: Continue bronchodilators.  Patient is maintaining O2 saturation of  greater than 92% on room air.  Nicotine cessation counseling has been discussed with the patient.  He is to have PFTs as outpatient.  - PVD (peripheral vascular disease) with claudication: Workup is in progress.    - Continue GI and DVT prophylaxis.    Discharge Planning: Likely discharge in a.m.    Joseph Flores MD   08/09/18   11:42 AM

## 2018-08-10 ENCOUNTER — APPOINTMENT (OUTPATIENT)
Dept: CARDIOLOGY | Facility: HOSPITAL | Age: 58
End: 2018-08-10

## 2018-08-10 ENCOUNTER — APPOINTMENT (OUTPATIENT)
Dept: PULMONOLOGY | Facility: HOSPITAL | Age: 58
End: 2018-08-10

## 2018-08-10 ENCOUNTER — APPOINTMENT (OUTPATIENT)
Dept: NUCLEAR MEDICINE | Facility: HOSPITAL | Age: 58
End: 2018-08-10

## 2018-08-10 VITALS
BODY MASS INDEX: 28.88 KG/M2 | RESPIRATION RATE: 18 BRPM | OXYGEN SATURATION: 95 % | WEIGHT: 225 LBS | SYSTOLIC BLOOD PRESSURE: 128 MMHG | HEART RATE: 59 BPM | DIASTOLIC BLOOD PRESSURE: 70 MMHG | TEMPERATURE: 97.8 F | HEIGHT: 74 IN

## 2018-08-10 LAB
BH CV STRESS BP STAGE 1: NORMAL
BH CV STRESS COMMENTS STAGE 1: NORMAL
BH CV STRESS DOSE REGADENOSON STAGE 1: 0.4
BH CV STRESS DURATION MIN STAGE 1: 0
BH CV STRESS DURATION SEC STAGE 1: 10
BH CV STRESS HR STAGE 1: 63
BH CV STRESS PROTOCOL 1: NORMAL
BH CV STRESS RECOVERY BP: NORMAL MMHG
BH CV STRESS RECOVERY HR: 68 BPM
BH CV STRESS STAGE 1: 1
GLUCOSE BLDC GLUCOMTR-MCNC: 171 MG/DL (ref 70–130)
GLUCOSE BLDC GLUCOMTR-MCNC: 72 MG/DL (ref 70–130)
GLUCOSE BLDC GLUCOMTR-MCNC: 88 MG/DL (ref 70–130)
LV EF NUC BP: 59 %
MAXIMAL PREDICTED HEART RATE: 161 BPM
PERCENT MAX PREDICTED HR: 60.87 %
STRESS BASELINE BP: NORMAL MMHG
STRESS BASELINE HR: 54 BPM
STRESS PERCENT HR: 72 %
STRESS POST ESTIMATED WORKLOAD: 1 METS
STRESS POST PEAK BP: NORMAL MMHG
STRESS POST PEAK HR: 98 BPM
STRESS TARGET HR: 137 BPM

## 2018-08-10 PROCEDURE — 78452 HT MUSCLE IMAGE SPECT MULT: CPT | Performed by: INTERNAL MEDICINE

## 2018-08-10 PROCEDURE — 94060 EVALUATION OF WHEEZING: CPT

## 2018-08-10 PROCEDURE — 63710000001 INSULIN DETEMIR PER 5 UNITS: Performed by: INTERNAL MEDICINE

## 2018-08-10 PROCEDURE — 94727 GAS DIL/WSHOT DETER LNG VOL: CPT

## 2018-08-10 PROCEDURE — 94060 EVALUATION OF WHEEZING: CPT | Performed by: INTERNAL MEDICINE

## 2018-08-10 PROCEDURE — G0378 HOSPITAL OBSERVATION PER HR: HCPCS

## 2018-08-10 PROCEDURE — 93017 CV STRESS TEST TRACING ONLY: CPT

## 2018-08-10 PROCEDURE — 93018 CV STRESS TEST I&R ONLY: CPT | Performed by: INTERNAL MEDICINE

## 2018-08-10 PROCEDURE — A9500 TC99M SESTAMIBI: HCPCS | Performed by: INTERNAL MEDICINE

## 2018-08-10 PROCEDURE — 93016 CV STRESS TEST SUPVJ ONLY: CPT | Performed by: INTERNAL MEDICINE

## 2018-08-10 PROCEDURE — 99214 OFFICE O/P EST MOD 30 MIN: CPT | Performed by: INTERNAL MEDICINE

## 2018-08-10 PROCEDURE — 82962 GLUCOSE BLOOD TEST: CPT

## 2018-08-10 PROCEDURE — 94729 DIFFUSING CAPACITY: CPT | Performed by: INTERNAL MEDICINE

## 2018-08-10 PROCEDURE — 25010000002 REGADENOSON 0.4 MG/5ML SOLUTION: Performed by: NURSE PRACTITIONER

## 2018-08-10 PROCEDURE — 94729 DIFFUSING CAPACITY: CPT

## 2018-08-10 PROCEDURE — 78452 HT MUSCLE IMAGE SPECT MULT: CPT

## 2018-08-10 PROCEDURE — 0 TECHNETIUM SESTAMIBI: Performed by: INTERNAL MEDICINE

## 2018-08-10 PROCEDURE — 94727 GAS DIL/WSHOT DETER LNG VOL: CPT | Performed by: INTERNAL MEDICINE

## 2018-08-10 RX ORDER — LOSARTAN POTASSIUM 25 MG/1
12.5 TABLET ORAL
Qty: 15 TABLET | Refills: 1 | Status: SHIPPED | OUTPATIENT
Start: 2018-08-11 | End: 2018-09-25 | Stop reason: ALTCHOICE

## 2018-08-10 RX ORDER — 0.9 % SODIUM CHLORIDE 0.9 %
10 VIAL (ML) INJECTION AS NEEDED
Status: DISCONTINUED | OUTPATIENT
Start: 2018-08-10 | End: 2018-08-10 | Stop reason: HOSPADM

## 2018-08-10 RX ADMIN — GABAPENTIN 100 MG: 100 CAPSULE ORAL at 12:20

## 2018-08-10 RX ADMIN — REGADENOSON 0.4 MG: 0.08 INJECTION, SOLUTION INTRAVENOUS at 10:51

## 2018-08-10 RX ADMIN — ATORVASTATIN CALCIUM 10 MG: 10 TABLET, FILM COATED ORAL at 12:14

## 2018-08-10 RX ADMIN — INSULIN DETEMIR 35 UNITS: 100 INJECTION, SOLUTION SUBCUTANEOUS at 12:14

## 2018-08-10 RX ADMIN — TECHNETIUM TC 99M SESTAMIBI 1 DOSE: 1 INJECTION INTRAVENOUS at 09:50

## 2018-08-10 RX ADMIN — SODIUM CHLORIDE, PRESERVATIVE FREE 10 ML: 5 INJECTION INTRAVENOUS at 10:51

## 2018-08-10 RX ADMIN — Medication 12.5 MG: at 12:14

## 2018-08-10 RX ADMIN — TECHNETIUM TC 99M SESTAMIBI 1 DOSE: 1 INJECTION INTRAVENOUS at 10:52

## 2018-08-10 NOTE — PLAN OF CARE
Problem: Patient Care Overview  Goal: Plan of Care Review  Outcome: Ongoing (interventions implemented as appropriate)   08/10/18 6999   Coping/Psychosocial   Plan of Care Reviewed With patient   OTHER   Outcome Summary VSS, bradycardic, no c/o chest pain, resting throughout the night, continue to monitor     Goal: Individualization and Mutuality  Outcome: Ongoing (interventions implemented as appropriate)

## 2018-08-10 NOTE — DISCHARGE SUMMARY
[facility] Medicine Services  DISCHARGE SUMMARY       Date of Admission: 8/8/2018  Date of Discharge:  8/10/2018  Primary Care Physician: Guanako Atkins MD    Presenting Problem/History of Present Illness:  Chest pain, unspecified type [R07.9]         Patient is a 57 y.o. male history of hypertension, dyslipidemia, diabetes mellitus, nicotine dependence presents with one-week history of intermittent left-sided chest pain.  Pain does not radiate and typically lasts about 10 minutes before it resolves.  There is no specific aggravating or relieving factors.  Patient denies associated shortness of air, nausea, vomiting, palpitation, diaphoresis, syncope or presyncope.  He had seen his cardiologist at [place] as an outpatient yesterday but left before his assessment could be completed.     He had blood work in the emergency department and noted to have normal troponin, normal chest x-ray and EKG that showed normal sinus rhythm with no acute changes.       Final Discharge Diagnoses:  Hospital Problem List     * (Principal)Chest pain    Dyspnea on exertion    Chronic fatigue    PVD (peripheral vascular disease) with claudication (CMS/[facility])          Consults:   Consults     Date and Time Order Name Status Description    8/8/2018 1627 Inpatient Cardiology Consult Completed     8/8/2018 1433 Hospitalist (on-call MD unless specified)            Procedures Performed: None                Pertinent Test Results:   Lab Results (last 72 hours)     Procedure Component Value Units Date/Time    POC Glucose Once [[id number]]  (Normal) Collected:  08/10/18 1116    Specimen:  Blood Updated:  08/10/18 1127     Glucose 88 mg/dL      Comment: Meter: [id number]: [id number] [facility]       POC Glucose Once [[id number]]  (Normal) Collected:  08/10/18 0722    Specimen:  Blood Updated:  08/10/18 0743     Glucose 72 mg/dL      Comment: RN NotifiedMeter: [id number]:  [id number] CONSTANTINO NEWMAN       POC Glucose Once [[id number]]  (Abnormal) Collected:  08/09/18 2009    Specimen:  Blood Updated:  08/10/18 0651     Glucose 171 (H) mg/dL      Comment: RN NotifiedMeter: [id number]: [id number] CRISTO NELSON       POC Glucose Once [[id number]]  (Abnormal) Collected:  08/09/18 1842    Specimen:  Blood Updated:  08/09/18 1852     Glucose 201 (H) mg/dL      Comment: RN NotifiedMeter: [id number]: [id number] JENNIFER CASE       POC Glucose Once [[id number]]  (Abnormal) Collected:  08/09/18 1707    Specimen:  Blood Updated:  08/09/18 1851     Glucose 65 (L) mg/dL      Comment: Meter: [id number]: [id number] DOUG YUAN       POC Glucose Once [[id number]]  (Abnormal) Collected:  08/09/18 1631    Specimen:  Blood Updated:  08/09/18 1643     Glucose 50 (L) mg/dL      Comment: RN NotifiedMeter: [id number]: [id number] JENNIFER CASE       POC Glucose Once [[id number]]  (Abnormal) Collected:  08/09/18 1107    Specimen:  Blood Updated:  08/09/18 1128     Glucose 160 (H) mg/dL      Comment: RN NotifiedMeter: [id number]: [id number] JENNIFER CASE       Iron Profile [[id number]]  (Normal) Collected:  08/08/18 1238    Specimen:  Blood Updated:  08/09/18 0948     Iron 74 mcg/dL      TIBC 349 mcg/dL      Iron Saturation 21 %     POC Glucose Once [[id number]]  (Normal) Collected:  08/09/18 0730    Specimen:  Blood Updated:  08/09/18 0754     Glucose 85 mg/dL      Comment: Meter: [id number]: [id number] JENNIFER CASE       CBC & Differential [[id number]] Collected:  08/09/18 0507    Specimen:  Blood Updated:  08/09/18 0721    Narrative:       The following orders were created for panel order CBC & Differential.  Procedure                               Abnormality         Status                     ---------                               -----------         ------                     Manual Differential[[id number]]          Abnormal            Final result                CBC Auto Differential[[id number]]        Abnormal            Final result                 Please view results for these tests on the individual orders.    Manual Differential [[id number]]  (Abnormal) Collected:  08/09/18 0507    Specimen:  Blood Updated:  08/09/18 0721     Neutrophil % 42.0 %      Lymphocyte % 43.0 %      Monocyte % 9.0 %      Eosinophil % 5.0 %      Bands %  1.0 %      Neutrophils Absolute 5.40 10*3/mm3      Lymphocytes Absolute 5.40 (H) 10*3/mm3      Monocytes Absolute 1.13 (H) 10*3/mm3      Eosinophils Absolute 0.63 10*3/mm3      RBC Morphology Normal     WBC Morphology Normal     Platelet Estimate Adequate    Lipid Panel [[id number]]  (Abnormal) Collected:  08/09/18 0507    Specimen:  Blood Updated:  08/09/18 0650     Total Cholesterol 126 mg/dL      Triglycerides 75 mg/dL      HDL Cholesterol 41 (L) mg/dL      LDL Cholesterol  66 mg/dL      LDL/HDL Ratio 1.71    Troponin [[id number]]  (Normal) Collected:  08/09/18 0507    Specimen:  Blood Updated:  08/09/18 0650     Troponin I <0.012 ng/mL     Basic Metabolic Panel [[id number]]  (Normal) Collected:  08/09/18 0507    Specimen:  Blood Updated:  08/09/18 0639     Glucose 69 mg/dL      BUN 17 mg/dL      Creatinine 0.79 mg/dL      Sodium 142 mmol/L      Potassium 4.0 mmol/L      Chloride 109 mmol/L      CO2 28.0 mmol/L      Calcium 8.8 mg/dL      eGFR Non African Amer 100 mL/min/1.73      BUN/Creatinine Ratio 21.5     Anion Gap 5.0 mmol/L     CBC Auto Differential [[id number]]  (Abnormal) Collected:  08/09/18 0507    Specimen:  Blood Updated:  08/09/18 0633     WBC 12.55 (H) 10*3/mm3      RBC 4.42 10*6/mm3      Hemoglobin 12.9 (L) g/dL      Hematocrit 38.3 (L) %      MCV 86.7 fL      MCH 29.2 pg      MCHC 33.7 g/dL      RDW 13.6 %      RDW-SD 43.4 fl      MPV 11.8 fL      Platelets 232 10*3/mm3     Troponin [[id number]]  (Normal) Collected:  08/08/18 2333    Specimen:  Blood Updated:  08/09/18 0012     Troponin I <0.012 ng/mL     POC Glucose  Once [[id number]]  (Abnormal) Collected:  08/08/18 2032    Specimen:  Blood Updated:  08/08/18 2053     Glucose 350 (H) mg/dL      Comment: RN NotifiedMeter: [id number]: [id number] TIM GIVENS       Troponin [[id number]]  (Normal) Collected:  08/08/18 1641    Specimen:  Blood Updated:  08/08/18 1709     Troponin I <0.012 ng/mL     POC Glucose Once [[id number]]  (Normal) Collected:  08/08/18 1635    Specimen:  Blood Updated:  08/08/18 1707     Glucose 106 mg/dL      Comment: Meter: [id number]: [id number] GONZALO COTE       [place] Draw [[id number]] Collected:  08/08/18 1238    Specimen:  Blood Updated:  08/08/18 1345    Narrative:       The following orders were created for panel order [place] Draw.  Procedure                               Abnormality         Status                     ---------                               -----------         ------                     Light Blue Top[[id number]]                                   Final result               Green Top (Gel)[[id number]]                                  Final result               Lavender Top[[id number]]                                     Final result               Gold Top - SST[[id number]]                                   Final result                 Please view results for these tests on the individual orders.    Light Blue Top [[id number]] Collected:  08/08/18 1238    Specimen:  Blood Updated:  08/08/18 1345     Extra Tube hold for add-on     Comment: Auto resulted       Green Top (Gel) [[id number]] Collected:  08/08/18 1238    Specimen:  Blood Updated:  08/08/18 1345     Extra Tube Hold for add-ons.     Comment: Auto resulted.       Lavender Top [[id number]] Collected:  08/08/18 1238    Specimen:  Blood Updated:  08/08/18 1345     Extra Tube hold for add-on     Comment: Auto resulted       Gold Top - SST [[id number]] Collected:  08/08/18 1238    Specimen:  Blood Updated:  08/08/18 1345     Extra Tube Hold for add-ons.     Comment: Auto  resulted.       BNP [[id number]]  (Normal) Collected:  08/08/18 1238    Specimen:  Blood Updated:  08/08/18 1319     proBNP 33.5 pg/mL     Troponin [[id number]]  (Normal) Collected:  08/08/18 1238    Specimen:  Blood Updated:  08/08/18 1319     Troponin I <0.012 ng/mL     Protime-INR [[id number]]  (Normal) Collected:  08/08/18 1238    Specimen:  Blood Updated:  08/08/18 1317     Protime 12.6 Seconds      INR 0.96    Narrative:       Therapeutic range for most indications is 2.0-3.0 INR,  or 2.5-3.5 for mechanical heart valves.    Comprehensive Metabolic Panel [[id number]]  (Abnormal) Collected:  08/08/18 1238    Specimen:  Blood Updated:  08/08/18 1307     Glucose 137 (H) mg/dL      BUN 15 mg/dL      Creatinine 0.88 mg/dL      Sodium 139 mmol/L      Potassium 4.6 mmol/L      Chloride 105 mmol/L      CO2 27.0 mmol/L      Calcium 9.0 mg/dL      Total Protein 7.4 g/dL      Albumin 4.30 g/dL      ALT (SGPT) 32 U/L      AST (SGOT) 34 U/L      Alkaline Phosphatase 70 U/L      Total Bilirubin 0.3 mg/dL      eGFR Non African Amer 89 mL/min/1.73      Globulin 3.1 gm/dL      A/G Ratio 1.4 g/dL      BUN/Creatinine Ratio 17.0     Anion Gap 7.0 mmol/L     CBC & Differential [[id number]] Collected:  08/08/18 1238    Specimen:  Blood Updated:  08/08/18 1247    Narrative:       The following orders were created for panel order CBC & Differential.  Procedure                               Abnormality         Status                     ---------                               -----------         ------                     CBC Auto Differential[[id number]]        Abnormal            Final result                 Please view results for these tests on the individual orders.    CBC Auto Differential [[id number]]  (Abnormal) Collected:  08/08/18 1238    Specimen:  Blood Updated:  08/08/18 1247     WBC 10.11 (H) 10*3/mm3      RBC 4.87 10*6/mm3      Hemoglobin 14.0 g/dL      Hematocrit 41.5 %      MCV 85.2 fL      MCH 28.7 pg      MCHC 33.7 g/dL       RDW 13.4 %      RDW-SD 41.9 fl      MPV 11.5 fL      Platelets 236 10*3/mm3      Neutrophil % 41.6 %      Lymphocyte % 44.0 %      Monocyte % 8.8 %      Eosinophil % 4.5 %      Basophil % 0.8 %      Immature Grans % 0.3 %      Neutrophils, Absolute 4.20 10*3/mm3      Lymphocytes, Absolute 4.45 (H) 10*3/mm3      Monocytes, Absolute 0.89 10*3/mm3      Eosinophils, Absolute 0.46 10*3/mm3      Basophils, Absolute 0.08 10*3/mm3      Immature Grans, Absolute 0.03 (H) 10*3/mm3         Imaging Results (last 7 days)     Procedure Component Value Units Date/Time    US Carotid Bilateral [[id number]] Collected:  08/09/18 1413     Updated:  08/09/18 1638    Narrative:         ULTRASOUND CAROTID DUPLEX SCAN    HISTORY: Carotid bruit. Dizziness. Chest pain.    COMPARISON: None.    Duplex ultrasound of the carotid bifurcations was performed.    Real time and color flow images demonstrate minimal right-sided  hard plaque formation.  No Doppler evidence of significant stenosis.  The peak systolic velocity in the right internal carotid artery  is 101.6 cm/s.  End-diastolic velocity 33.8 cm/s.  Ratio peak systolic velocity right internal carotid artery to  right common carotid artery 0.9.  The peak systolic velocity in left internal carotid artery is  91.8 cm/s.  End-diastolic velocity 30.9 cm/s.  Ratio peak systolic velocity left internal carotid artery to left  common carotid artery 0.8.  Antegrade flow is present in each vertebral artery.  Peak systolic velocity right external carotid artery 69.0 cm/s.  Peak systolic velocity left external carotid artery 78.8 cm/s.      Impression:       CONCLUSION:  Less than 50% diameter reduction stenosis right internal carotid  artery.  Normal left internal carotid artery.    [id number]    Electronically signed by:  Du Helton MD  8/9/2018 2:36 PM CDT  Workstation: [employer] Chest 1 View [[id number]] Collected:  08/08/18 1220     Updated:  08/08/18 1238    Narrative:         PORTABLE  "CHEST    HISTORY: Chest pain    Portable AP upright film of the chest was obtained at 12:06 PM.  COMPARISON: July 1, 2016    EKG leads.  The lungs are clear of an acute process.  The heart is not enlarged.  The pulmonary vasculature is not increased.  No pleural effusion.  No pneumothorax.  No acute osseous abnormality.  Prior resection far lateral aspect of the right clavicle.  Surgical clips left upper quadrant of the abdomen.      Impression:       CONCLUSION:  No Acute Disease    [id number]    Electronically signed by:  Du Helton MD  8/8/2018 12:37 PM CDT  Workstation: [employer]            Chief Complaint on Day of Discharge: None.    Hospital Course:  The patient was admitted to monitored bed to rule out myocardial infarction.  His serial cardiac markers were negative.  He was seen by cardiologist and underwent a nuclear stress test which was unremarkable.  He was however placed on aspirin and statins due to his risk factors.  - Hypertension: His medication was changed to losartan for renal protective purposes.  - Diabetes mellitus: He was continued on anti-glycemic with Accu-Cheks and sliding scale insulin and his blood sugar remained stable.   - Nicotine dependence: He was placed on nicotine patch.  Nicotine cessation counseling was discussed with the patient.  He is to be scheduled for PFT as an outpatient.  - Peripheral vascular disease with claudication.  Patient will be scheduled for ROSA as outpatient.     Condition on Discharge:  Stable and improved    Physical Exam on Discharge:  /70 (BP Location: Left arm, Patient Position: Lying)   Pulse 59   Temp 97.8 °F (36.6 °C) (Oral)   Resp 18   Ht 188 cm (74\")   Wt 102 kg (225 lb)   SpO2 95%   BMI 28.89 kg/m²   Physical Exam   Constitutional: He is oriented to person, place, and time. He appears well-developed and well-nourished. No distress.   HENT:   Head: Normocephalic and atraumatic.   Eyes: Pupils are equal, round, and reactive to light. " EOM are normal. No scleral icterus.   Neck: Normal range of motion. Neck supple. No JVD present. No thyromegaly present.   Cardiovascular: Normal rate, regular rhythm and normal heart sounds.  Exam reveals no gallop and no friction rub.    No murmur heard.  Pulmonary/Chest: Effort normal and breath sounds normal. He has no wheezes. He has no rales. He exhibits no tenderness.   Abdominal: Soft. Bowel sounds are normal. He exhibits no distension and no mass. There is no tenderness. There is no rebound and no guarding.   Musculoskeletal: He exhibits no edema, tenderness or deformity.   Neurological: He is alert and oriented to person, place, and time. No cranial nerve deficit. He exhibits normal muscle tone. Coordination normal.   Skin: Skin is warm and dry. No rash noted. He is not diaphoretic. No erythema. No pallor.   Psychiatric: He has a normal mood and affect. His behavior is normal. Judgment and thought content normal.   Nursing note and vitals reviewed.        Discharge Disposition:  Home or Self Care    Discharge Medications:     Discharge Medications      New Medications      Instructions Start Date   losartan 25 MG tablet  Commonly known as:  COZAAR   12.5 mg, Oral, Every 24 Hours Scheduled         Continue These Medications      Instructions Start Date   aspirin 81 MG chewable tablet   81 mg, Oral, Daily      GABAPENTIN PO   200 mg, Oral, Nightly      glucose blood test strip   USE TO TEST BLOOD SUGAR 3 TIMES A DAY      insulin aspart 100 UNIT/ML solution pen-injector sc pen  Commonly known as:  novoLOG FLEXPEN   Up 15u sq tid      Insulin Glargine 100 UNIT/ML injection pen  Commonly known as:  BASAGLAR KWIKPEN   INJECT 40 UNITS EVERY MORNING AND 45 UNITS EVERY EVENING      magnesium oxide 400 MG tablet  Commonly known as:  MAG-OX   400 mg, Oral, 2 Times Daily      meloxicam 15 MG tablet  Commonly known as:  MOBIC   15 mg, Oral, Daily      nicotine 14 MG/24HR patch  Commonly known as:  NICODERM CQ   1  patch, Transdermal, Every 24 Hours      simvastatin 10 MG tablet  Commonly known as:  ZOCOR   10 mg, Oral, Nightly      tamsulosin 0.4 MG capsule 24 hr capsule  Commonly known as:  FLOMAX   1 capsule, Oral, Daily         Stop These Medications    diltiaZEM 30 MG tablet  Commonly known as:  CARDIZEM            Discharge Diet:  cardiac and diabetic    Activity at Discharge:  as tolerated    Discharge Care Plan/Instructions: Patient has been instructed to take his medications as prescribed and to return to the emergency department in the event of any worsening symptoms.  He will be scheduled for PFT and ROSA as outpatient.  Nicotine cessation counseling has been discussed.    Follow-up Appointments:   Future Appointments  Date Time Provider Department Center   9/4/2018 10:15 AM NURSE [facility] OPI [facility]   9/4/2018 10:45 AM rTesa Yo APRN [facility] ONC [facility]   9/25/2018 1:30 PM Nano Delarosa APRN [facility] CTV [facility] None       Test Results Pending at Discharge:     Joseph Flores MD  08/10/18  2:26 PM    Time: 35 minutes

## 2018-08-10 NOTE — PLAN OF CARE
Problem: Patient Care Overview  Goal: Plan of Care Review   08/10/18 1552   Coping/Psychosocial   Plan of Care Reviewed With patient;spouse   Plan of Care Review   Progress improving   OTHER   Outcome Summary stress test today

## 2018-08-10 NOTE — PROGRESS NOTES
"  Cardiovascular Daily Inpatient Progress Note  Shaan Pierson M.D., Ph.D., [facility]      Subjective     Interval History:   No CP    Review of Systems   Cardiovascular: Negative.    Respiratory: Negative.        Objective     Vital Sign Min/Max for last 24 hours  Temp  Min: 96.7 °F (35.9 °C)  Max: 98.4 °F (36.9 °C)   BP  Min: 110/67  Max: 128/75   Pulse  Min: 56  Max: 69   Resp  Min: 18  Max: 18   SpO2  Min: 96 %  Max: 98 %   No Data Recorded   No Data Recorded     Flowsheet Rows      First Filed Value   Admission Height  188 cm (74\") Documented at 08/08/2018 1209   Admission Weight  102 kg (225 lb) Documented at 08/08/2018 1209        1    08/08/18  1209   Weight: 102 kg (225 lb)     Body mass index is 28.89 kg/m².  Physical Exam:  Vitals:    08/10/18 0316   BP: 118/69   Pulse: 69   Resp: 18   Temp: 97 °F (36.1 °C)   SpO2: 97%     Body mass index is 28.89 kg/m².   Last Weights:   Wt Readings from Last 3 Encounters:   08/08/18 102 kg (225 lb)   07/16/18 101 kg (222 lb)   03/02/18 98.5 kg (217 lb 2.5 oz)     Pulse Ox: Normal   General: alert, appears stated age and cooperative  Body Habitus: overweight  HEENT: Head: Normocephalic, no lesions, without obvious abnormality. No arcus senilis, xanthelasma or xanthomas.  JVP: 6 cm of water at 45 degrees   Volume/Pulsation: Normal  Heart rate: normal    Heart Rhythm: regular     Heart Sounds: S1: normal intensity  S2: normal intensity  S3: absent   S4: absent  Opening Snap: absent  A2-OS:  absent.   Pericardial rub: absent    Ejection click: None      Murmurs:  absent   Extremity: moves all extremities equally.        DATA REVIEWED:         --------------------------------------------------------------------------------------------------  LABS:   Lab Results   Component Value Date    GLUCOSE 69 08/09/2018    BUN 17 08/09/2018    CREATININE 0.79 08/09/2018    EGFRIFNONA 100 08/09/2018    BCR 21.5 08/09/2018    K 4.0 08/09/2018    CO2 28.0 08/09/2018    CALCIUM 8.8 " 08/09/2018    ALBUMIN 4.30 08/08/2018    AST 34 08/08/2018    ALT 32 08/08/2018       Lab Results   Component Value Date    WBC 12.55 (H) 08/09/2018    HGB 12.9 (L) 08/09/2018    HCT 38.3 (L) 08/09/2018    MCV 86.7 08/09/2018     08/09/2018       Lab Results   Component Value Date    CHOL 126 08/09/2018    CHLPL 182 07/02/2016    TRIG 75 08/09/2018    HDL 41 (L) 08/09/2018    LDL 66 08/09/2018       Lab Results   Component Value Date    TSH 5.16 (H) 07/02/2016       Lab Results   Component Value Date    CKTOTAL 52 (L) 07/02/2016    CKMB 0.6 07/02/2016    TROPONINI <0.012 08/09/2018       No results found for: HGBA1C  No results found for: DDIMER  Lab Results   Component Value Date    ALT 32 08/08/2018     No results found for: HGBA1C  Lab Results   Component Value Date    CREATININE 0.79 08/09/2018     Lab Results   Component Value Date    IRON 74 08/08/2018    TIBC 349 08/08/2018     Lab Results   Component Value Date    INR 0.96 08/08/2018    INR 0.9 07/02/2016    INR 1.0 09/19/2014    PROTIME 12.6 08/08/2018    PROTIME 12.4 07/02/2016    PROTIME 13.3 09/19/2014     Lab Results (last 24 hours)     Procedure Component Value Units Date/Time    POC Glucose Once [[id number]]  (Abnormal) Collected:  08/09/18 1842    Specimen:  Blood Updated:  08/09/18 1852     Glucose 201 (H) mg/dL      Comment: RN NotifiedMeter: [id number]: [id number] JENNIFER CASE       POC Glucose Once [[id number]]  (Abnormal) Collected:  08/09/18 1707    Specimen:  Blood Updated:  08/09/18 1851     Glucose 65 (L) mg/dL      Comment: Meter: [id number]: [id number] DOUG YUAN       POC Glucose Once [[id number]]  (Abnormal) Collected:  08/09/18 1631    Specimen:  Blood Updated:  08/09/18 1643     Glucose 50 (L) mg/dL      Comment: RN NotifiedMeter: [id number]: [id number] JENNIFER CASE       POC Glucose Once [[id number]]  (Abnormal) Collected:  08/09/18 1107    Specimen:  Blood Updated:  08/09/18 1128     Glucose 160  (H) mg/dL      Comment: RN NotifiedMeter: [id number]: [id number] JENNIFER MANPREET       Iron Profile [[id number]]  (Normal) Collected:  08/08/18 1238    Specimen:  Blood Updated:  08/09/18 0948     Iron 74 mcg/dL      TIBC 349 mcg/dL      Iron Saturation 21 %     POC Glucose Once [[id number]]  (Normal) Collected:  08/09/18 0730    Specimen:  Blood Updated:  08/09/18 0754     Glucose 85 mg/dL      Comment: Meter: [id number]: [id number] JENNIFER MANPREET       CBC & Differential [[id number]] Collected:  08/09/18 0507    Specimen:  Blood Updated:  08/09/18 0721    Narrative:       The following orders were created for panel order CBC & Differential.  Procedure                               Abnormality         Status                     ---------                               -----------         ------                     Manual Differential[[id number]]          Abnormal            Final result               CBC Auto Differential[[id number]]        Abnormal            Final result                 Please view results for these tests on the individual orders.    Manual Differential [[id number]]  (Abnormal) Collected:  08/09/18 0507    Specimen:  Blood Updated:  08/09/18 0721     Neutrophil % 42.0 %      Lymphocyte % 43.0 %      Monocyte % 9.0 %      Eosinophil % 5.0 %      Bands %  1.0 %      Neutrophils Absolute 5.40 10*3/mm3      Lymphocytes Absolute 5.40 (H) 10*3/mm3      Monocytes Absolute 1.13 (H) 10*3/mm3      Eosinophils Absolute 0.63 10*3/mm3      RBC Morphology Normal     WBC Morphology Normal     Platelet Estimate Adequate    Lipid Panel [[id number]]  (Abnormal) Collected:  08/09/18 0507    Specimen:  Blood Updated:  08/09/18 0650     Total Cholesterol 126 mg/dL      Triglycerides 75 mg/dL      HDL Cholesterol 41 (L) mg/dL      LDL Cholesterol  66 mg/dL      LDL/HDL Ratio 1.71    Troponin [[id number]]  (Normal) Collected:  08/09/18 0507    Specimen:  Blood Updated:  08/09/18 0650     Troponin I <0.012 ng/mL      Basic Metabolic Panel [[id number]]  (Normal) Collected:  08/09/18 0507    Specimen:  Blood Updated:  08/09/18 0639     Glucose 69 mg/dL      BUN 17 mg/dL      Creatinine 0.79 mg/dL      Sodium 142 mmol/L      Potassium 4.0 mmol/L      Chloride 109 mmol/L      CO2 28.0 mmol/L      Calcium 8.8 mg/dL      eGFR Non African Amer 100 mL/min/1.73      BUN/Creatinine Ratio 21.5     Anion Gap 5.0 mmol/L     CBC Auto Differential [[id number]]  (Abnormal) Collected:  08/09/18 0507    Specimen:  Blood Updated:  08/09/18 0633     WBC 12.55 (H) 10*3/mm3      RBC 4.42 10*6/mm3      Hemoglobin 12.9 (L) g/dL      Hematocrit 38.3 (L) %      MCV 86.7 fL      MCH 29.2 pg      MCHC 33.7 g/dL      RDW 13.6 %      RDW-SD 43.4 fl      MPV 11.8 fL      Platelets 232 10*3/mm3         Imaging Results (last 24 hours)     Procedure Component Value Units Date/Time    US Carotid Bilateral [[id number]] Collected:  08/09/18 1413     Updated:  08/09/18 1638    Narrative:         ULTRASOUND CAROTID DUPLEX SCAN    HISTORY: Carotid bruit. Dizziness. Chest pain.    COMPARISON: None.    Duplex ultrasound of the carotid bifurcations was performed.    Real time and color flow images demonstrate minimal right-sided  hard plaque formation.  No Doppler evidence of significant stenosis.  The peak systolic velocity in the right internal carotid artery  is 101.6 cm/s.  End-diastolic velocity 33.8 cm/s.  Ratio peak systolic velocity right internal carotid artery to  right common carotid artery 0.9.  The peak systolic velocity in left internal carotid artery is  91.8 cm/s.  End-diastolic velocity 30.9 cm/s.  Ratio peak systolic velocity left internal carotid artery to left  common carotid artery 0.8.  Antegrade flow is present in each vertebral artery.  Peak systolic velocity right external carotid artery 69.0 cm/s.  Peak systolic velocity left external carotid artery 78.8 cm/s.      Impression:       CONCLUSION:  Less than 50% diameter reduction stenosis right  internal carotid  artery.  Normal left internal carotid artery.    [id number]    Electronically signed by:  Du Helton MD  8/9/2018 2:36 PM CDT  Workstation: [id number]            Assessment/Plan      1.  Atypical chest pain with risk factors and an intermediate-risk male.  Agree with ischemia evaluation.  -Nothing by mouth, nuclear stress today  -2-D TTE  -ASA/Statin    2.  Dyspnea, likely undiagnosed COPD.  -Agree with PFTs    3.  Likely PAD.  -ABIs as an outpatient          This document has been electronically signed by Shaan Pierson MD PhD on August 10, 2018 6:29 AM

## 2018-08-10 NOTE — TREATMENT PLAN
CARDS plan    - Nuclear low risk    - Carotids ok    - Iron profile WNL    - ABIs and CVTS outpatient referral. This order was placed.     - PFTs completed today.     - Outpatient pulm referral made to Dr. Helton    - [facility]     - Can follow up with cardiology as needed as outpatient.             This document has been electronically signed by NIKIA Stokes on August 10, 2018 3:59 PM

## 2018-09-04 ENCOUNTER — LAB (OUTPATIENT)
Dept: ONCOLOGY | Facility: HOSPITAL | Age: 58
End: 2018-09-04

## 2018-09-04 ENCOUNTER — OFFICE VISIT (OUTPATIENT)
Dept: ONCOLOGY | Facility: CLINIC | Age: 58
End: 2018-09-04

## 2018-09-04 VITALS
BODY MASS INDEX: 28.45 KG/M2 | HEART RATE: 69 BPM | TEMPERATURE: 98 F | DIASTOLIC BLOOD PRESSURE: 70 MMHG | SYSTOLIC BLOOD PRESSURE: 109 MMHG | RESPIRATION RATE: 18 BRPM | WEIGHT: 221.6 LBS

## 2018-09-04 DIAGNOSIS — Z86.2 HISTORY OF LEUKOCYTOSIS: Primary | ICD-10-CM

## 2018-09-04 DIAGNOSIS — D72.829 LEUKOCYTOSIS, UNSPECIFIED TYPE: ICD-10-CM

## 2018-09-04 LAB
ALBUMIN SERPL-MCNC: 4.3 G/DL (ref 3.4–4.8)
ALBUMIN/GLOB SERPL: 1.3 G/DL (ref 1.1–1.8)
ALP SERPL-CCNC: 91 U/L (ref 38–126)
ALT SERPL W P-5'-P-CCNC: 33 U/L (ref 21–72)
ANION GAP SERPL CALCULATED.3IONS-SCNC: 10 MMOL/L (ref 5–15)
AST SERPL-CCNC: 31 U/L (ref 17–59)
BASOPHILS # BLD AUTO: 0.07 10*3/MM3 (ref 0–0.2)
BASOPHILS NFR BLD AUTO: 0.6 % (ref 0–2)
BILIRUB SERPL-MCNC: 0.4 MG/DL (ref 0.2–1.3)
BUN BLD-MCNC: 17 MG/DL (ref 7–21)
BUN/CREAT SERPL: 17 (ref 7–25)
CALCIUM SPEC-SCNC: 8.9 MG/DL (ref 8.4–10.2)
CHLORIDE SERPL-SCNC: 102 MMOL/L (ref 95–110)
CO2 SERPL-SCNC: 25 MMOL/L (ref 22–31)
CREAT BLD-MCNC: 1 MG/DL (ref 0.7–1.3)
DEPRECATED RDW RBC AUTO: 41.2 FL (ref 35.1–43.9)
EOSINOPHIL # BLD AUTO: 0.65 10*3/MM3 (ref 0–0.7)
EOSINOPHIL NFR BLD AUTO: 5.2 % (ref 0–7)
ERYTHROCYTE [DISTWIDTH] IN BLOOD BY AUTOMATED COUNT: 13.4 % (ref 11.5–14.5)
GFR SERPL CREATININE-BSD FRML MDRD: 76 ML/MIN/1.73 (ref 56–130)
GLOBULIN UR ELPH-MCNC: 3.3 GM/DL (ref 2.3–3.5)
GLUCOSE BLD-MCNC: 266 MG/DL (ref 60–100)
HCT VFR BLD AUTO: 43 % (ref 39–49)
HGB BLD-MCNC: 15.1 G/DL (ref 13.7–17.3)
IMM GRANULOCYTES # BLD: 0.03 10*3/MM3 (ref 0–0.02)
IMM GRANULOCYTES NFR BLD: 0.2 % (ref 0–0.5)
LYMPHOCYTES # BLD AUTO: 3.93 10*3/MM3 (ref 0.6–4.2)
LYMPHOCYTES NFR BLD AUTO: 31.4 % (ref 10–50)
MCH RBC QN AUTO: 29.7 PG (ref 26.5–34)
MCHC RBC AUTO-ENTMCNC: 35.1 G/DL (ref 31.5–36.3)
MCV RBC AUTO: 84.5 FL (ref 80–98)
MONOCYTES # BLD AUTO: 0.96 10*3/MM3 (ref 0–0.9)
MONOCYTES NFR BLD AUTO: 7.7 % (ref 0–12)
NEUTROPHILS # BLD AUTO: 6.87 10*3/MM3 (ref 2–8.6)
NEUTROPHILS NFR BLD AUTO: 54.9 % (ref 37–80)
PLATELET # BLD AUTO: 226 10*3/MM3 (ref 150–450)
PMV BLD AUTO: 10.8 FL (ref 8–12)
POTASSIUM BLD-SCNC: 4.4 MMOL/L (ref 3.5–5.1)
PROT SERPL-MCNC: 7.6 G/DL (ref 6.3–8.6)
RBC # BLD AUTO: 5.09 10*6/MM3 (ref 4.37–5.74)
SODIUM BLD-SCNC: 137 MMOL/L (ref 137–145)
WBC NRBC COR # BLD: 12.51 10*3/MM3 (ref 3.2–9.8)

## 2018-09-04 PROCEDURE — 80053 COMPREHEN METABOLIC PANEL: CPT | Performed by: NURSE PRACTITIONER

## 2018-09-04 PROCEDURE — 99214 OFFICE O/P EST MOD 30 MIN: CPT | Performed by: NURSE PRACTITIONER

## 2018-09-04 PROCEDURE — 36415 COLL VENOUS BLD VENIPUNCTURE: CPT | Performed by: NURSE PRACTITIONER

## 2018-09-04 PROCEDURE — G0463 HOSPITAL OUTPT CLINIC VISIT: HCPCS | Performed by: NURSE PRACTITIONER

## 2018-09-04 PROCEDURE — 85025 COMPLETE CBC W/AUTO DIFF WBC: CPT | Performed by: NURSE PRACTITIONER

## 2018-09-04 PROCEDURE — 99406 BEHAV CHNG SMOKING 3-10 MIN: CPT | Performed by: NURSE PRACTITIONER

## 2018-09-04 NOTE — PROGRESS NOTES
DATE OF VISIT: 9/4/2018    REASON FOR VISIT:  6 month follow-up for history of leukocytosis, reactive    HISTORY OF PRESENT ILLNESS: 57-year-old male with a past medical history significant for dyslipidemia, gout, type 2 diabetes mellitus history of splenectomy was seen initially in consultation on April 24, 2017 for leukocytosis; Work-up included ALICE 2 mutation that was negative and peripheral flow cytometry that was negative. It was felt that his elevated WBC was due to chronic inflammation, previous splenectomy and smoking history; highly unlikely for any underlying bone marrow abnormality or lymphoma.     Pt is here today for follow-up. He was recently hospitialized for non cardiac related chest pain. Has appt with pulmonary and GI.    PAST MEDICAL HISTORY:    Past Medical History:   Diagnosis Date   • Diabetes mellitus (CMS/HCC)    • Gout    • Hyperlipemia        SOCIAL HISTORY:    Social History   Substance Use Topics   • Smoking status: Current Every Day Smoker     Packs/day: 1.00     Types: Cigarettes   • Smokeless tobacco: Not on file   • Alcohol use Yes       Surgical History :  Past Surgical History:   Procedure Laterality Date   • BACK SURGERY     • CHOLECYSTECTOMY     • COLONOSCOPY N/A 3/14/2017    Procedure: COLONOSCOPY;  Surgeon: Cachorro Perez DO;  Location: [facility] ENDOSCOPY;  Service:    • HAND SURGERY     • LEG SURGERY Right    • NOSE SURGERY     • PANCREAS SURGERY     • SPLENECTOMY         ALLERGIES:    No Known Allergies    REVIEW OF SYSTEMS:      CONSTITUTIONAL:  No fever, chills, or night sweats.     HEENT:  No epistaxis, mouth sores, or difficulty swallowing.    RESPIRATORY:  No new shortness of breath or cough at present.    CARDIOVASCULAR:  No chest pain or palpitations.    GASTROINTESTINAL:  No abdominal pain, nausea, vomiting, or blood in the stool.    GENITOURINARY:  No dysuria or hematuria.    MUSCULOSKELETAL:  No any new back pain or arthralgias.     NEUROLOGICAL:  No tingling or  numbness. No new headache or dizziness.     LYMPHATICS:  Denies any abnormal swollen and anywhere in the body.    SKIN:  Denies any new skin rash.    PHYSICAL EXAMINATION:      VITAL SIGNS:  /70   Pulse 69   Temp 98 °F (36.7 °C)   Resp 18   Wt 101 kg (221 lb 9.6 oz)   BMI 28.45 kg/m²     GENERAL:  Not in any distress.    HEENT:  Normocephalic, Atraumatic.Mild Conjunctival pallor. No icterus.  No Facial Asymmetry noted.    NECK:  No adenopathy. No JVD.    RESPIRATORY:  Fair air entry bilateral. No rhonchi or wheezing.    CARDIOVASCULAR:  S1, S2. Regular rate and rhythm. No murmur or gallop appreciated.    ABDOMEN:  Soft, obese, nontender. Bowel sounds present in all four quadrants.  No organomegaly appreciated.    EXTREMITIES:  No edema.No Calf Tenderness.    NEUROLOGIC:  Alert, awake and oriented ×3.      SKIN : No new skin lesion identified    DIAGNOSTIC DATA:    Glucose   Date Value Ref Range Status   09/04/2018 266 (H) 60 - 100 mg/dL Final     Sodium   Date Value Ref Range Status   09/04/2018 137 137 - 145 mmol/L Final     Potassium   Date Value Ref Range Status   09/04/2018 4.4 3.5 - 5.1 mmol/L Final     CO2   Date Value Ref Range Status   09/04/2018 25.0 22.0 - 31.0 mmol/L Final     Chloride   Date Value Ref Range Status   09/04/2018 102 95 - 110 mmol/L Final     Anion Gap   Date Value Ref Range Status   09/04/2018 10.0 5.0 - 15.0 mmol/L Final     Creatinine   Date Value Ref Range Status   09/04/2018 1.00 0.70 - 1.30 mg/dL Final     BUN   Date Value Ref Range Status   09/04/2018 17 7 - 21 mg/dL Final     BUN/Creatinine Ratio   Date Value Ref Range Status   09/04/2018 17.0 7.0 - 25.0 Final     Calcium   Date Value Ref Range Status   09/04/2018 8.9 8.4 - 10.2 mg/dL Final     eGFR Non  Amer   Date Value Ref Range Status   09/04/2018 76 56 - 130 mL/min/1.73 Final     Alkaline Phosphatase   Date Value Ref Range Status   09/04/2018 91 38 - 126 U/L Final     Total Protein   Date Value Ref Range  Status   09/04/2018 7.6 6.3 - 8.6 g/dL Final     ALT (SGPT)   Date Value Ref Range Status   09/04/2018 33 21 - 72 U/L Final     AST (SGOT)   Date Value Ref Range Status   09/04/2018 31 17 - 59 U/L Final     Total Bilirubin   Date Value Ref Range Status   09/04/2018 0.4 0.2 - 1.3 mg/dL Final     Albumin   Date Value Ref Range Status   09/04/2018 4.30 3.40 - 4.80 g/dL Final     Globulin   Date Value Ref Range Status   09/04/2018 3.3 2.3 - 3.5 gm/dL Final     Lab Results   Component Value Date    WBC 12.51 (H) 09/04/2018    HGB 15.1 09/04/2018    HCT 43.0 09/04/2018    MCV 84.5 09/04/2018     09/04/2018     Lab Results   Component Value Date    NEUTROABS 6.87 09/04/2018    IRON 74 08/08/2018    TIBC 349 08/08/2018    LABIRON 21 08/08/2018     Lab Results   Component Value Date    REFLABREPO see attached report 10/16/2017    REFLABREPO SEE ATTACHED REPORT 10/16/2017   ]    Peripheral blood flow cytometry done on October 16, 2017 was negative for any immunophenotypic abnormality.     JAK2 mutation done on peripheral blood on October 16, 2017 was negative.           ASSESSMENT AND PLAN:       1. Chronic leukocytosis, most likely secondary to chronic smoking vs splenectomy vs chronic inflammation; unlikely that this is an underlying bone marrow issue. His WBC is stable at this time, will plan to continue observation and recheck CBC again in 6 mos.     2. Health maintenance, pt continues to smoke; spent about 4 minutes discussing the hazards of smoking; including increased risk for developing lung cancer, bladder cancer and renal cell cancer. Patient had a colonoscopy in 2017.     3. Diabetes, being managed by Dr. Atkins.      4. Hypertension, BP is 109/70    Patient's Body mass index is 28.45 kg/m². BMI is above normal parameters. Recommendations include: nutrition counseling.    This document has been signed by NIKIA Feliciano on September 4, 2018 12:41 PM

## 2018-09-14 ENCOUNTER — OFFICE VISIT (OUTPATIENT)
Dept: PULMONOLOGY | Facility: CLINIC | Age: 58
End: 2018-09-14

## 2018-09-14 VITALS
HEART RATE: 101 BPM | HEIGHT: 74 IN | WEIGHT: 225.5 LBS | DIASTOLIC BLOOD PRESSURE: 76 MMHG | OXYGEN SATURATION: 98 % | BODY MASS INDEX: 28.94 KG/M2 | SYSTOLIC BLOOD PRESSURE: 112 MMHG

## 2018-09-14 DIAGNOSIS — E66.3 OVERWEIGHT (BMI 25.0-29.9): ICD-10-CM

## 2018-09-14 DIAGNOSIS — F17.210 CIGARETTE NICOTINE DEPENDENCE, UNCOMPLICATED: ICD-10-CM

## 2018-09-14 DIAGNOSIS — J44.9 CHRONIC OBSTRUCTIVE PULMONARY DISEASE, UNSPECIFIED COPD TYPE (HCC): ICD-10-CM

## 2018-09-14 DIAGNOSIS — R06.09 DYSPNEA ON EXERTION: Primary | ICD-10-CM

## 2018-09-14 DIAGNOSIS — G47.33 OSA (OBSTRUCTIVE SLEEP APNEA): ICD-10-CM

## 2018-09-14 PROCEDURE — 99204 OFFICE O/P NEW MOD 45 MIN: CPT | Performed by: INTERNAL MEDICINE

## 2018-09-14 PROCEDURE — 99406 BEHAV CHNG SMOKING 3-10 MIN: CPT | Performed by: INTERNAL MEDICINE

## 2018-09-14 PROCEDURE — G0296 VISIT TO DETERM LDCT ELIG: HCPCS | Performed by: INTERNAL MEDICINE

## 2018-09-14 RX ORDER — ALBUTEROL SULFATE 90 UG/1
2 AEROSOL, METERED RESPIRATORY (INHALATION) EVERY 4 HOURS PRN
Qty: 18 G | Refills: 11 | Status: SHIPPED | OUTPATIENT
Start: 2018-09-14 | End: 2022-07-06

## 2018-09-14 NOTE — PROGRESS NOTES
Pulmonary Consultation    Ana Sandy, NIKIA,    Thank you for asking me to see Sandoval Navarro for   Chief Complaint   Patient presents with   • Breathing Difficulty   .    Subjective     History of Present Illness  Sandoval Navarro is a 57 y.o. male with a PMH significant for COPD, tobacco use, ALICE, PAD, congenital pancreatic deficiency, and IDDM who presents for evaluation of dyspnea. Pt states he was hospitalized for chest pain a month ago after he was having a lot of issues with palpitations. Ana Sandy saw him during his hospitalization and she recommended he have a pulmonary evaluation. His wife states he has been having increased issues with dyspnea over the past few weeks so she had him take some of her ventolin. Pt continues to have chest pressure which waxes/ wanes and is relieved with rest. He states he has BYRD, occasional wheeze, and chronic cough productive of yellow brown sputum. His wife states he snores loudly and she has noticed him stop breathing in his sleep. Pt was supposed to have a HST but his insurance would not cover it. He has not been back to Dr. Corbin for follow-up. Pt smokes 1-2ppd for the past 45yrs, but he is not ready to quit. He has used the patch but it did not help. 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 There is no lung disease or known lung cancer in the family.     Review of Systems: History obtained from chart review and the patient.  Review of Systems   Constitutional: Positive for fatigue and fever.        Snoring, witnessed apneas   HENT: Positive for congestion.    Respiratory: Positive for cough and shortness of breath.    Cardiovascular: Positive for chest pain.   Gastrointestinal: Positive for abdominal pain.   Musculoskeletal: Positive for arthralgias and back pain.   Neurological: Positive for dizziness.   Psychiatric/Behavioral: Positive for dysphoric mood.     As described in the HPI. Otherwise, remainder of ROS (14 systems)  were negative.    Patient Active Problem List   Diagnosis   • Leukocytosis   • Chest pain   • Dyspnea on exertion   • Chronic fatigue   • PVD (peripheral vascular disease) with claudication (CMS/HCC)   • Cigarette nicotine dependence, uncomplicated   • Overweight (BMI 25.0-29.9)   • Chronic obstructive pulmonary disease (CMS/HCC)         Current Outpatient Prescriptions:   •  aspirin 81 MG chewable tablet, Chew 81 mg Daily., Disp: , Rfl:   •  GABAPENTIN PO, Take 200 mg by mouth Every Night., Disp: , Rfl:   •  glucose blood test strip, USE TO TEST BLOOD SUGAR 3 TIMES A DAY, Disp: , Rfl:   •  insulin aspart (novoLOG FLEXPEN) 100 UNIT/ML solution pen-injector sc pen, Up 15u sq tid, Disp: , Rfl:   •  Insulin Glargine (BASAGLAR KWIKPEN) 100 UNIT/ML injection pen, INJECT 40 UNITS EVERY MORNING AND 45 UNITS EVERY EVENING, Disp: , Rfl: 5  •  magnesium oxide (MAG-OX) 400 MG tablet, Take 400 mg by mouth 2 (Two) Times a Day., Disp: , Rfl:   •  meloxicam (MOBIC) 15 MG tablet, Take 15 mg by mouth Daily., Disp: , Rfl:   •  nicotine (NICODERM CQ) 14 MG/24HR patch, Place 1 patch on the skin Daily., Disp: 15 patch, Rfl: 2  •  Pancrelipase, Lip-Prot-Amyl, (CREON) 25290 units capsule delayed-release particles, Take  by mouth Daily., Disp: , Rfl:   •  simvastatin (ZOCOR) 10 MG tablet, Take 10 mg by mouth Every Night., Disp: , Rfl:   •  tamsulosin (FLOMAX) 0.4 MG capsule 24 hr capsule, Take 1 capsule by mouth Daily., Disp: , Rfl:   •  albuterol (PROVENTIL HFA;VENTOLIN HFA) 108 (90 Base) MCG/ACT inhaler, Inhale 2 puffs Every 4 (Four) Hours As Needed for Wheezing., Disp: 18 g, Rfl: 11  •  losartan (COZAAR) 25 MG tablet, Take 0.5 tablets by mouth Daily., Disp: 15 tablet, Rfl: 1    Past Medical History:   Diagnosis Date   • Diabetes mellitus (CMS/HCC)    • Gout    • Hyperlipemia      Past Surgical History:   Procedure Laterality Date   • BACK SURGERY     • CHOLECYSTECTOMY     • COLONOSCOPY N/A 3/14/2017    Procedure: COLONOSCOPY;  Surgeon:  "Cachorro Perez, DO;  Location: [facility] ENDOSCOPY;  Service:    • HAND SURGERY     • LEG SURGERY Right    • NOSE SURGERY     • PANCREAS SURGERY     • SPLENECTOMY       Social History     Social History   • Marital status: Single     Social History Main Topics   • Smoking status: Current Every Day Smoker     Packs/day: 1.00     Types: Cigarettes   • Alcohol use Yes   • Drug use: No   • Sexual activity: Defer     Other Topics Concern   • Not on file     History reviewed. No pertinent family history.       Objective     Blood pressure 112/76, pulse 101, height 188 cm (74\"), weight 102 kg (225 lb 8 oz), SpO2 98 %.  Physical Exam   Constitutional: He is oriented to person, place, and time. Vital signs are normal. He appears well-developed and well-nourished.   Overweight   HENT:   Head: Normocephalic and atraumatic.   Nose: Nose normal.   Mouth/Throat: Uvula is midline, oropharynx is clear and moist and mucous membranes are normal. He has dentures.   Mallampati 4   Eyes: Pupils are equal, round, and reactive to light. Conjunctivae, EOM and lids are normal.   Neck: Trachea normal and normal range of motion. No tracheal tenderness present. No thyroid mass present.   Cardiovascular: Normal rate, regular rhythm and normal heart sounds.  PMI is not displaced.  Exam reveals no gallop.    No murmur heard.  Pulmonary/Chest: Effort normal and breath sounds normal. No respiratory distress. He has no decreased breath sounds. He has no wheezes. He has no rhonchi. Chest wall is not dull to percussion. He exhibits no tenderness.   Abdominal: Soft. Normal appearance and bowel sounds are normal. There is no hepatomegaly. There is no tenderness.   Musculoskeletal:   Normal gait, no extremity edema     Vascular Status -  His right foot exhibits no edema. His left foot exhibits no edema.  Lymphadenopathy:        Head (right side): No submandibular adenopathy present.        Head (left side): No submandibular adenopathy present.     He has " no cervical adenopathy.        Right: No supraclavicular adenopathy present.        Left: No supraclavicular adenopathy present.   Neurological: He is alert and oriented to person, place, and time.   Skin: Skin is warm and dry. No rash noted. No cyanosis. Nails show no clubbing.   Psychiatric: He has a normal mood and affect. His speech is normal and behavior is normal. Judgment normal.   Nursing note and vitals reviewed.      PFTs: 8/10/18 (independently reviewed and interpreted by me)  Ratio 78  FVC 4.59/86%  FEV1 3.57/84%  TLC 6.54/86%  DLCO 20.7/74%  Normal spirometry and lung volumes.  Mildly reduced diffusing capacity.  No comparative data available.    Radiology (independently reviewed and interpreted by me): CXR 8/8/18 showed NACPD    Lexiscan 8/10/18:  · Nuclear Study Description: A 1-day rest/stress protocol myocardial perfusion imaging study was performed  · Stress Description: A pharmacological stress test was performed using regadenoson without low-level exercise  · Stress ECG: Stress ECG of normal sinus rhythm noted. Non-specific ST-T wave changes noted  · Normal ECG stress ECG interpretation  · Left ventricular ejection fraction is normal (Calculated EF = 59%).  · Myocardial perfusion imaging indicates a normal myocardial perfusion study with no evidence of ischemia.  · Impressions are consistent with a low risk study     Assessment/Plan     Sandoval was seen today for breathing difficulty.    Diagnoses and all orders for this visit:    Dyspnea on exertion    Chronic obstructive pulmonary disease, unspecified COPD type (CMS/[facility])  -     albuterol (PROVENTIL HFA;VENTOLIN HFA) 108 (90 Base) MCG/ACT inhaler; Inhale 2 puffs Every 4 (Four) Hours As Needed for Wheezing.    Cigarette nicotine dependence, uncomplicated  -     CT Chest Low Dose Wo; Future    ALICE (obstructive sleep apnea)    Overweight (BMI 25.0-29.9)         Discussion/ Recommendations:   PFTs were normal with the exception of a mildly reduced  diffusing capacity.  Recent chest x-ray was unremarkable.  I think the patient likely is in the early stages of COPD given his extensive tobacco smoking history and he would benefit from the initiation of bronchodilators.  I also think he has a strong likelihood of having ALICE and warrants a sleep study at the direction of Dr. Corbin.  Unfortunately, he does continue to smoke and is not interested in quitting at this time.  He does have several risk factors for underlying heart disease, but it is reassuring that he had a recent stress which was low risk.    -Start albuterol 2 puffs every 4 hours as needed for dyspnea or wheeze.  Instructed on proper technique.  Depending on response as well as frequency of use at the next visit, we'll discuss escalation to a long-acting bronchodilator.  -Encouraged him to follow up with Dr. Corbin regarding recommended in-lab sleep study.  -I advised Sandoval of the risks of continuing to use tobacco, and I provided him with tobacco cessation educational materials in the After Visit Summary. He is pre-contemplative. During this visit, I spent 4 minutes counseling the patient regarding tobacco cessation.  -The patient meets criteria for lung cancer screening CT (LDCT); 1-2ppd x 45yrs, still smoking with no symptoms of lung cancer.  Reviewed benefits of such screening including, early detection of potentially curable lung cancer.  Also, discussed risks of screening including, radiation exposure, test anxiety, false positives, risk associated with future procedures, and possibility of clinically significant incidental findings.  The patient does agree to undergo lung cancer screening.  -Encouraged him to get flu vaccine soon and to discuss both Pneumovax and Prevnar 13 with his PCP.    Patient's Body mass index is 28.95 kg/m². BMI is above normal parameters. Recommendations include: exercise counseling.       Return in about 4 weeks (around 10/12/2018) for Recheck COPD, f/u  LDCT.      Thank you for allowing me to participate in the care of Sandoval Navarro. Please do not hesitate to contact me with any questions.         This document has been electronically signed by Merle Helton MD on September 14, 2018 9:11 AM      Dictated using Dragon

## 2018-09-25 ENCOUNTER — OFFICE VISIT (OUTPATIENT)
Dept: CARDIAC SURGERY | Facility: CLINIC | Age: 58
End: 2018-09-25

## 2018-09-25 VITALS
OXYGEN SATURATION: 99 % | WEIGHT: 228.9 LBS | SYSTOLIC BLOOD PRESSURE: 118 MMHG | BODY MASS INDEX: 29.38 KG/M2 | HEART RATE: 74 BPM | DIASTOLIC BLOOD PRESSURE: 70 MMHG | TEMPERATURE: 98.2 F | HEIGHT: 74 IN

## 2018-09-25 DIAGNOSIS — I73.9 PVD (PERIPHERAL VASCULAR DISEASE) WITH CLAUDICATION (HCC): Primary | ICD-10-CM

## 2018-09-25 DIAGNOSIS — I65.21 CAROTID STENOSIS, ASYMPTOMATIC, RIGHT: ICD-10-CM

## 2018-09-25 DIAGNOSIS — F17.210 CIGARETTE NICOTINE DEPENDENCE, UNCOMPLICATED: ICD-10-CM

## 2018-09-25 PROCEDURE — 99214 OFFICE O/P EST MOD 30 MIN: CPT | Performed by: NURSE PRACTITIONER

## 2018-09-25 PROCEDURE — 99406 BEHAV CHNG SMOKING 3-10 MIN: CPT | Performed by: NURSE PRACTITIONER

## 2018-09-26 ENCOUNTER — HOSPITAL ENCOUNTER (OUTPATIENT)
Dept: CT IMAGING | Facility: HOSPITAL | Age: 58
Discharge: HOME | End: 2018-09-26
Attending: INTERNAL MEDICINE | Admitting: INTERNAL MEDICINE

## 2018-09-26 DIAGNOSIS — F17.210 CIGARETTE NICOTINE DEPENDENCE, UNCOMPLICATED: ICD-10-CM

## 2018-09-26 PROCEDURE — G0297 LDCT FOR LUNG CA SCREEN: HCPCS

## 2018-10-01 NOTE — PROGRESS NOTES
Subjective   Patient ID: Sandoval Navarro is a 57 y.o. male is being seen for consultation today at the request of NIKIA Cordero    Chief Complaint:    Chief Complaint   Patient presents with   • Peripheral Vascular Disease       The following portions of the patient's history were reviewed and updated as appropriate: allergies, current medications, past family history, past medical history, past social history, past surgical history and problem list.  Recent images independently reviewed.  Available laboratory values reviewed.    PCP:  Guanako Atkins MD  Cardiology:  Heatherly    57 y.o. male with Hyperlipidemia(stable, increased risk cardiovascular events), Diabetes Mellitus(stable, increased risk cardiovascular events) and Smoker(uncontrolled, increased risk cardiovascular events)  Carotid Stenosis(stable,asymptomaic).  smokes 1 PPD.  Presents for vascular evaluation.  No TIA stroke amaurosis.  No MI complains of claudication. No other associated signs, symptoms or modifying factors.    8/2018: Carotid duplex ([facility]): ARASH 0-49% (101cm/) LICA 0-49% (91cm/s) antegrade  9/25/18: ROSA: RIGHT 1.2 Triphasic. LEFT 1.1 Triphasic      Past Medical History:   Diagnosis Date   • Diabetes mellitus (CMS/HCC)    • Gout    • Hyperlipemia      Past Surgical History:   Procedure Laterality Date   • BACK SURGERY     • CHOLECYSTECTOMY     • COLONOSCOPY N/A 3/14/2017    Procedure: COLONOSCOPY;  Surgeon: Cachorro Perez DO;  Location: [facility] ENDOSCOPY;  Service:    • HAND SURGERY     • LEG SURGERY Right    • NOSE SURGERY     • PANCREAS SURGERY     • SPLENECTOMY         ALLERGIES:   Patient has no known allergies.    MEDICATIONS:      Current Outpatient Prescriptions:   •  aspirin 81 MG chewable tablet, Chew 81 mg Daily., Disp: , Rfl:   •  GABAPENTIN PO, Take 200 mg by mouth Every Night., Disp: , Rfl:   •  glucose blood test strip, USE TO TEST BLOOD SUGAR 3 TIMES A DAY, Disp: , Rfl:   •  insulin aspart (novoLOG FLEXPEN)  100 UNIT/ML solution pen-injector sc pen, Up 15u sq tid, Disp: , Rfl:   •  Insulin Glargine (BASAGLAR KWIKPEN) 100 UNIT/ML injection pen, INJECT 40 UNITS EVERY MORNING AND 45 UNITS EVERY EVENING, Disp: , Rfl: 5  •  magnesium oxide (MAG-OX) 400 MG tablet, Take 400 mg by mouth 2 (Two) Times a Day., Disp: , Rfl:   •  meloxicam (MOBIC) 15 MG tablet, Take 15 mg by mouth Daily., Disp: , Rfl:   •  nicotine (NICODERM CQ) 14 MG/24HR patch, Place 1 patch on the skin Daily., Disp: 15 patch, Rfl: 2  •  Pancrelipase, Lip-Prot-Amyl, (CREON) 14657 units capsule delayed-release particles, Take  by mouth Daily., Disp: , Rfl:   •  simvastatin (ZOCOR) 10 MG tablet, Take 10 mg by mouth Every Night., Disp: , Rfl:   •  tamsulosin (FLOMAX) 0.4 MG capsule 24 hr capsule, Take 1 capsule by mouth Daily., Disp: , Rfl:   •  albuterol (PROVENTIL HFA;VENTOLIN HFA) 108 (90 Base) MCG/ACT inhaler, Inhale 2 puffs Every 4 (Four) Hours As Needed for Wheezing., Disp: 18 g, Rfl: 11    Review of Systems   Constitution: Negative for weakness.   Eyes: Negative for visual disturbance.   Cardiovascular: Positive for claudication and dyspnea on exertion. Negative for cyanosis.   Respiratory: Negative for shortness of breath.    Skin: Negative for color change and nail changes.   Musculoskeletal: Positive for arthritis and joint pain. Negative for muscle weakness.   Gastrointestinal: Negative for dysphagia.   Neurological: Negative for focal weakness, light-headedness, loss of balance, numbness and paresthesias.   Psychiatric/Behavioral: Negative for altered mental status.   All other systems reviewed and are negative.       Objective       Vitals:    09/25/18 1313   BP: 118/70   Pulse: 74   Temp: 98.2 °F (36.8 °C)   SpO2: 99%      Body mass index is 29.39 kg/m².  Physical Exam   Constitutional: He is oriented to person, place, and time. He appears well-developed.   HENT:   Head: Normocephalic.   Eyes: EOM are normal.   Neck: Neck supple. Carotid bruit is not  present.   Cardiovascular: Normal rate and normal heart sounds.    Pulses:       Dorsalis pedis pulses are 2+ on the right side, and 2+ on the left side.        Posterior tibial pulses are 2+ on the right side, and 2+ on the left side.   Pulmonary/Chest: Effort normal and breath sounds normal.   Abdominal: Soft. Bowel sounds are normal.   Musculoskeletal: Normal range of motion. He exhibits no edema.   Gait normal   Neurological: He is alert and oriented to person, place, and time. No cranial nerve deficit.   Skin: Skin is warm and dry. Capillary refill takes less than 2 seconds.   No venous staining   Psychiatric: He has a normal mood and affect. Thought content normal.   Vitals reviewed.          Assessment/Plan   Independent Review of Radiographic Studies:    Detailed discussion regarding risks, benefits, and treatment plan. Images independently reviewed. Patient understands, agrees, and wishes to proceed with plan.     1. PVD (peripheral vascular disease) with claudication (CMS/HCC)  Normal Arterial Flow Bilateral Lower Extremities  If signs and symptoms of ischemia should occur including but not limited to pale/blue discoloration of limb, increasing pain with ambulation or at rest, or a non-healing wound. Patient is to notify Heart and Vascular center for immediate evaluation.   Recheck if symptoms occur  - Doppler Ankle Brachial Index Single Level CAR; Future (today)    2. Carotid stenosis, asymptomatic, right  Mild Carotid Stenosis-RIGHT  Medical Management: ASA,STATIN   If you should experience any neurological symptoms including but not limited to visual or speech disturbances confusion, seizures, or weakness of limbs of one side of your body notify Heart and Vascular center immediately for evaluation or if after hours present to the nearest Emergency Department.    Return  1 year- Carotid Duplex  - Duplex Carotid Ultrasound CAR; Future    3. Cigarette nicotine dependence, uncomplicated  Smoking cessation  assistance options offered including behavioral counseling (Smoking Cessation Classes), Nicotine replacement therapy (patches or gum), pharmacologic therapy (Chantix, Wellbutrin). Understands tobacco increases risk of expanding AAA, MI, CVA, PAD, carcinoma. Discussion and question answer period 5-7 minutes.   I advised Sandoval of the risks of continuing to use tobacco, and I provided him with tobacco cessation educational materials in the After Visit Summary.     During this visit, I spent 5 minutes counseling the patient regarding tobacco cessation.               This document has been electronically signed by NIKIA Ibanez on October 1, 2018 10:41 AM

## 2018-10-12 ENCOUNTER — OFFICE VISIT (OUTPATIENT)
Dept: PULMONOLOGY | Facility: CLINIC | Age: 58
End: 2018-10-12

## 2018-10-12 VITALS
HEART RATE: 88 BPM | OXYGEN SATURATION: 96 % | HEIGHT: 74 IN | DIASTOLIC BLOOD PRESSURE: 77 MMHG | BODY MASS INDEX: 29.79 KG/M2 | SYSTOLIC BLOOD PRESSURE: 132 MMHG | WEIGHT: 232.1 LBS

## 2018-10-12 DIAGNOSIS — J44.9 CHRONIC OBSTRUCTIVE PULMONARY DISEASE, UNSPECIFIED COPD TYPE (HCC): Primary | ICD-10-CM

## 2018-10-12 DIAGNOSIS — G47.33 OSA (OBSTRUCTIVE SLEEP APNEA): ICD-10-CM

## 2018-10-12 DIAGNOSIS — M79.604 PAIN OF RIGHT LOWER EXTREMITY: ICD-10-CM

## 2018-10-12 DIAGNOSIS — E66.3 OVERWEIGHT (BMI 25.0-29.9): ICD-10-CM

## 2018-10-12 DIAGNOSIS — F17.210 CIGARETTE NICOTINE DEPENDENCE, UNCOMPLICATED: ICD-10-CM

## 2018-10-12 PROCEDURE — 99214 OFFICE O/P EST MOD 30 MIN: CPT | Performed by: INTERNAL MEDICINE

## 2018-10-12 PROCEDURE — 99406 BEHAV CHNG SMOKING 3-10 MIN: CPT | Performed by: INTERNAL MEDICINE

## 2018-10-12 NOTE — PROGRESS NOTES
"    Pulmonary Office Follow-up     Sandoval Navarro is seen in the office today for   Chief Complaint   Patient presents with   • COPD     4 week saw   • Results   .    Subjective     History of Present Illness  Sandoval Navarro is a 57 y.o. male with a PMH significant for COPD, tobacco use, ALICE, PAD, congenital pancreatic deficiency, and IDDM who presents for evaluation of COPD.  He was last seen on 9/14/18, at which time I recommended starting albuterol as needed to assess response as well as her continued use.  I also counseled him on the importance of complete tobacco cessation and recommended he undergo lung cancer screening. Pt states the inhaler made him a \"mean sucker\" so he stopped using it after 2 days. He denies chest pain or wheeze, but he having a cough productive of yellow sputum.  Overall, he states that his breathing issues are improved and he does not feel the need for bronchodilator at this time.  Pt does continue to smoke and admits that he is smoking a little more due to stress. He states he is planning to try quitting at the end of the year. He has not gotten the flu vaccine.  Patient does complain of right thigh pain as well as not when he lies down at night which is preventing him from sleeping.  He denies any issues during the daytime.  Patient reports that he did fall and injure his knee last year but his knee has not been causing him any specific issues.    Review of Systems: History obtained from chart review and the patient.  Review of Systems   Constitutional: Positive for fatigue. Negative for fever and unexpected weight change.        Snoring, witnessed apneas   Respiratory: Positive for cough and shortness of breath.    Cardiovascular: Negative for chest pain.   Musculoskeletal: Positive for back pain and myalgias.     As described in the HPI. Otherwise, remainder of ROS (14 systems) were negative.    Patient Active Problem List   Diagnosis   • Leukocytosis   • Chest pain   • " "Dyspnea on exertion   • Chronic fatigue   • PVD (peripheral vascular disease) with claudication (CMS/HCC)   • Cigarette nicotine dependence, uncomplicated   • Overweight (BMI 25.0-29.9)   • Chronic obstructive pulmonary disease (CMS/HCC)   • Carotid stenosis, asymptomatic, right   • ALICE (obstructive sleep apnea)     Medications, Allergies, Social, and Family Histories reviewed as per EMR.       Objective     Blood pressure 132/77, pulse 88, height 188 cm (74\"), weight 105 kg (232 lb 1.6 oz), SpO2 96 %.  Physical Exam   Constitutional: He is oriented to person, place, and time. Vital signs are normal. He appears well-developed and well-nourished.   Overweight   HENT:   Head: Normocephalic and atraumatic.   Nose: Nose normal.   Mouth/Throat: Uvula is midline, oropharynx is clear and moist and mucous membranes are normal. He has dentures.   Mallampati 4   Eyes: Pupils are equal, round, and reactive to light. Conjunctivae, EOM and lids are normal.   Neck: Trachea normal and normal range of motion. No tracheal tenderness present. No thyroid mass present.   Cardiovascular: Normal rate, regular rhythm and normal heart sounds.  PMI is not displaced.  Exam reveals no gallop.    No murmur heard.  Pulmonary/Chest: Effort normal and breath sounds normal. No respiratory distress. He has no decreased breath sounds. He has no wheezes. He has no rhonchi. He exhibits no tenderness.   Abdominal: Soft. Normal appearance and bowel sounds are normal. There is no hepatomegaly. There is no tenderness.   Musculoskeletal:   Normal gait, no extremity edema     Vascular Status -  His right foot exhibits no edema. His left foot exhibits no edema.  Lymphadenopathy:        Head (right side): No submandibular adenopathy present.        Head (left side): No submandibular adenopathy present.     He has no cervical adenopathy.        Right: No supraclavicular adenopathy present.        Left: No supraclavicular adenopathy present.   Neurological: He " is alert and oriented to person, place, and time.   Skin: Skin is warm and dry. No rash noted. No cyanosis. Nails show no clubbing.   Psychiatric: He has a normal mood and affect. His speech is normal and behavior is normal. Judgment normal.   Nursing note and vitals reviewed.    IMAGING: LDCT 9/26/18 (independently reviewed and interpreted by me) showed emphysematous changes, no nodules       Assessment/Plan     Sandoval was seen today for copd and results.    Diagnoses and all orders for this visit:    Chronic obstructive pulmonary disease, unspecified COPD type (CMS/[facility])    Cigarette nicotine dependence, uncomplicated    Overweight (BMI 25.0-29.9)    ALICE (obstructive sleep apnea)    Pain of right lower extremity         Discussion/ Recommendations:   While mood changes are not a common side effect of the albuterol, he did not perceive much benefit from his trial.  Given that his dyspnea has improved I do not feel that long-acting bronchodilators are necessary at this time.  He did have a lung cancer screening CT which was negative for any nodules so we'll plan on doing this annually until he falls out of criteria.  Unfortunately, he has increased his smoking, but he is considering setting a quit date for later this year.  I'm not sure as to the etiology of his leg pain, but is likely musculoskeletal in nature.    -No additional bronchodilators at this time.  -Needs follow up with Dr. Corbin regarding recommended in-lab sleep study.  -I advised Sandoval of the risks of continuing to use tobacco, and I provided him with tobacco cessation educational materials in the After Visit Summary.  Encouraged him to pick a quit date and stick with it.  During this visit, I spent 3 minutes counseling the patient regarding tobacco cessation.  -Continue annual LD CT screening September 2019.  -Encouraged him to get flu vaccine soon and to discuss both Pneumovax and Prevnar 13 with his PCP.    Patient's Body mass index is 29.8  kg/m². BMI is above normal parameters. Recommendations include: exercise counseling.       Return in about 3 months (around 1/12/2019) for Recheck COPD.      Thank you for allowing me to participate in the care of Sandoval Navarro. Please do not hesitate to contact me with any questions.         This document has been electronically signed by Merle Helton MD on October 12, 2018 11:04 AM      Dictated using Dragon

## 2019-01-17 ENCOUNTER — OFFICE VISIT (OUTPATIENT)
Dept: PULMONOLOGY | Facility: CLINIC | Age: 59
End: 2019-01-17

## 2019-01-17 VITALS
HEIGHT: 74 IN | OXYGEN SATURATION: 98 % | SYSTOLIC BLOOD PRESSURE: 134 MMHG | WEIGHT: 219.5 LBS | BODY MASS INDEX: 28.17 KG/M2 | HEART RATE: 81 BPM | DIASTOLIC BLOOD PRESSURE: 85 MMHG

## 2019-01-17 DIAGNOSIS — J44.9 CHRONIC OBSTRUCTIVE PULMONARY DISEASE, UNSPECIFIED COPD TYPE (HCC): Primary | ICD-10-CM

## 2019-01-17 DIAGNOSIS — G47.33 OSA (OBSTRUCTIVE SLEEP APNEA): ICD-10-CM

## 2019-01-17 DIAGNOSIS — F17.210 CIGARETTE NICOTINE DEPENDENCE, UNCOMPLICATED: ICD-10-CM

## 2019-01-17 DIAGNOSIS — E66.3 OVERWEIGHT (BMI 25.0-29.9): ICD-10-CM

## 2019-01-17 DIAGNOSIS — R53.83 OTHER FATIGUE: ICD-10-CM

## 2019-01-17 PROCEDURE — 99406 BEHAV CHNG SMOKING 3-10 MIN: CPT | Performed by: INTERNAL MEDICINE

## 2019-01-17 PROCEDURE — 99214 OFFICE O/P EST MOD 30 MIN: CPT | Performed by: INTERNAL MEDICINE

## 2019-01-17 NOTE — PROGRESS NOTES
Pulmonary Office Follow-up     Sandoval Navarro is seen in the office today for   Chief Complaint   Patient presents with   • COPD     3mo follow-up   .    Subjective     History of Present Illness  Sandoval Navarro is a 58 y.o. male with a PMH significant for COPD, tobacco use, ALICE, PAD, congenital pancreatic deficiency, and IDDM who presents for evaluation of COPD.  He was last seen on 10/12/18, at which time I do not recommend any additional bronchodilators but did  him on the importance of complete tobacco cessation.  He states that he continues with significant daytime fatigue and low energy.  Patient also has issues with dyspnea on minimal exertion.  He try the albuterol, but it made him irritable so he has not used it again.  He cannot recall having much benefit from using the albuterol.  He also complains of sinus pressure and nasal congestion recently, but he denies purulent mucus or fevers.  He does have a chronic cough but it is unchanged from baseline.  The patient does continue to smoke around half a pack a day.  He has tried nicotine patch, but feels like it has not helped very much.  He preferred using the nicotine patch from when he was hospitalized but he cannot find it on an outpatient basis.    Review of Systems: History obtained from chart review and the patient.  Review of Systems   Constitutional: Positive for fatigue. Negative for fever and unexpected weight change.   HENT: Positive for congestion and sinus pressure.    Respiratory: Positive for cough and shortness of breath. Negative for wheezing.    Cardiovascular: Negative for chest pain.     As described in the HPI. Otherwise, remainder of ROS (14 systems) were negative.    Patient Active Problem List   Diagnosis   • Leukocytosis   • Chest pain   • Dyspnea on exertion   • Chronic fatigue   • PVD (peripheral vascular disease) with claudication (CMS/[facility])   • Cigarette nicotine dependence, uncomplicated   • Overweight (BMI  "25.0-29.9)   • Chronic obstructive pulmonary disease (CMS/HCC)   • Carotid stenosis, asymptomatic, right   • ALICE (obstructive sleep apnea)     Medications, Allergies, Social, and Family Histories reviewed as per EMR.       Objective     Blood pressure 134/85, pulse 81, height 188 cm (74\"), weight 99.6 kg (219 lb 8 oz), SpO2 98 %.  Physical Exam   Constitutional: He is oriented to person, place, and time. Vital signs are normal. He appears well-developed and well-nourished.   Overweight   HENT:   Head: Normocephalic and atraumatic.   Nose: Nose normal. No mucosal edema.   Mouth/Throat: Uvula is midline, oropharynx is clear and moist and mucous membranes are normal. He has dentures.   Mallampati 4   Eyes: Conjunctivae, EOM and lids are normal. Pupils are equal, round, and reactive to light.   Neck: Trachea normal and normal range of motion. No tracheal tenderness present. No thyroid mass present.   Cardiovascular: Normal rate, regular rhythm and normal heart sounds. PMI is not displaced. Exam reveals no gallop.   No murmur heard.  Pulmonary/Chest: Effort normal and breath sounds normal. No respiratory distress. He has no decreased breath sounds. He has no wheezes. He has no rhonchi. He exhibits no tenderness.   Abdominal: Soft. Normal appearance and bowel sounds are normal. There is no hepatomegaly. There is no tenderness.   Musculoskeletal:   Normal gait, no extremity edema     Vascular Status -  His right foot exhibits no edema. His left foot exhibits no edema.  Lymphadenopathy:        Head (right side): No submandibular adenopathy present.        Head (left side): No submandibular adenopathy present.     He has no cervical adenopathy.        Right: No supraclavicular adenopathy present.        Left: No supraclavicular adenopathy present.   Neurological: He is alert and oriented to person, place, and time.   Skin: Skin is warm and dry. No rash noted. No cyanosis. Nails show no clubbing.   Psychiatric: He has a " normal mood and affect. His speech is normal and behavior is normal. Judgment normal.   Nursing note and vitals reviewed.    IMAGING: LDCT 9/26/18 (independently reviewed and interpreted by me) showed emphysematous changes, no nodules       Assessment/Plan     Sandoval was seen today for copd.    Diagnoses and all orders for this visit:    Chronic obstructive pulmonary disease, unspecified COPD type (CMS/HCC)  -     Discontinue: tiotropium bromide monohydrate (SPIRIVA RESPIMAT) 2.5 MCG/ACT aerosol solution inhaler; Inhale 2 puffs Daily.  -     tiotropium bromide monohydrate (SPIRIVA RESPIMAT) 2.5 MCG/ACT aerosol solution inhaler; Inhale 2 puffs Daily.    ALICE (obstructive sleep apnea)  -     Ambulatory Referral to Sleep Medicine    Overweight (BMI 25.0-29.9)    Cigarette nicotine dependence, uncomplicated    Other fatigue         Discussion/ Recommendations:   Given his frequent dyspnea, I think it is worth a trial long-acting bronchodilator.  I cannot explain his mood changes with albuterol, but it was enough that he does not want to try it again.  I think his fatigue and possibly some of his dyspnea is related to possible underlying ALICE and have encouraged him to reestablish with Dr. Corbin for formal sleep study.  He does continue to smoke and while he is working at cutting back, he has not been able to quit.    -Start Spiriva Respimat daily.  Sample provided and instructed on use.  -Refer back to Dr. Corbin to follow-up regarding possible sleep study.  I anticipate his insurance may have preferred an in lab study as opposed to home study, which she could not afford it.  -I advised Sandoval of the risks of continuing to use tobacco, and I provided him with tobacco cessation educational materials in the After Visit Summary.  Recommended that he continue with the nicotine patch and see if he can get patches through the hospital pharmacy.  Offered support.  During this visit, I spent 3 minutes counseling the patient  regarding tobacco cessation.  -Recommended he start using frequent nasal saline for congestion and sinus pressure.  -Continue annual LD CT screening September 2019 (1-2ppd x 45yrs, still smoking).    Patient's Body mass index is 28.18 kg/m². BMI is above normal parameters. Recommendations include: exercise counseling.       Return in about 4 weeks (around 2/14/2019) for Recheck COPD.      Thank you for allowing me to participate in the care of Sandoval Navarro. Please do not hesitate to contact me with any questions.         This document has been electronically signed by Merle Helton MD on January 17, 2019 3:36 PM      Dictated using Dragon

## 2019-02-01 ENCOUNTER — HOSPITAL ENCOUNTER (EMERGENCY)
Facility: HOSPITAL | Age: 59
Discharge: HOME | End: 2019-02-01
Attending: FAMILY MEDICINE | Admitting: FAMILY MEDICINE

## 2019-02-01 ENCOUNTER — APPOINTMENT (OUTPATIENT)
Dept: GENERAL RADIOLOGY | Facility: HOSPITAL | Age: 59
End: 2019-02-01

## 2019-02-01 ENCOUNTER — APPOINTMENT (OUTPATIENT)
Dept: CT IMAGING | Facility: HOSPITAL | Age: 59
End: 2019-02-01

## 2019-02-01 VITALS
BODY MASS INDEX: 28.91 KG/M2 | DIASTOLIC BLOOD PRESSURE: 68 MMHG | RESPIRATION RATE: 18 BRPM | SYSTOLIC BLOOD PRESSURE: 110 MMHG | HEART RATE: 60 BPM | HEIGHT: 74 IN | TEMPERATURE: 98 F | OXYGEN SATURATION: 98 % | WEIGHT: 225.3 LBS

## 2019-02-01 DIAGNOSIS — H93.13 TINNITUS OF BOTH EARS: Primary | ICD-10-CM

## 2019-02-01 LAB
ABO GROUP BLD: NORMAL
ALBUMIN SERPL-MCNC: 4.3 G/DL (ref 3.4–4.8)
ALBUMIN/GLOB SERPL: 1.7 G/DL (ref 1.1–1.8)
ALP SERPL-CCNC: 60 U/L (ref 38–126)
ALT SERPL W P-5'-P-CCNC: 26 U/L (ref 21–72)
ANION GAP SERPL CALCULATED.3IONS-SCNC: 3 MMOL/L (ref 5–15)
AST SERPL-CCNC: 27 U/L (ref 17–59)
BASOPHILS # BLD AUTO: 0.13 10*3/MM3 (ref 0–0.2)
BASOPHILS NFR BLD AUTO: 0.9 % (ref 0–2)
BILIRUB SERPL-MCNC: 0.2 MG/DL (ref 0.2–1.3)
BLD GP AB SCN SERPL QL: NEGATIVE
BUN BLD-MCNC: 18 MG/DL (ref 7–21)
BUN/CREAT SERPL: 19.8 (ref 7–25)
CALCIUM SPEC-SCNC: 10.1 MG/DL (ref 8.4–10.2)
CHLORIDE SERPL-SCNC: 102 MMOL/L (ref 95–110)
CO2 SERPL-SCNC: 31 MMOL/L (ref 22–31)
CREAT BLD-MCNC: 0.91 MG/DL (ref 0.7–1.3)
DEPRECATED RDW RBC AUTO: 43 FL (ref 35.1–43.9)
EOSINOPHIL # BLD AUTO: 0.61 10*3/MM3 (ref 0–0.7)
EOSINOPHIL NFR BLD AUTO: 4.4 % (ref 0–7)
ERYTHROCYTE [DISTWIDTH] IN BLOOD BY AUTOMATED COUNT: 13.5 % (ref 11.5–14.5)
GFR SERPL CREATININE-BSD FRML MDRD: 85 ML/MIN/1.73 (ref 49–113)
GLOBULIN UR ELPH-MCNC: 2.5 GM/DL (ref 2.3–3.5)
GLUCOSE BLD-MCNC: 56 MG/DL (ref 60–100)
GLUCOSE BLDC GLUCOMTR-MCNC: 61 MG/DL (ref 70–130)
GLUCOSE BLDC GLUCOMTR-MCNC: 72 MG/DL (ref 70–130)
HCT VFR BLD AUTO: 42 % (ref 39–49)
HGB BLD-MCNC: 14.5 G/DL (ref 13.7–17.3)
HOLD SPECIMEN: NORMAL
HOLD SPECIMEN: NORMAL
IMM GRANULOCYTES # BLD AUTO: 0.04 10*3/MM3 (ref 0–0.02)
IMM GRANULOCYTES NFR BLD AUTO: 0.3 % (ref 0–0.5)
INR PPP: 1.03 (ref 0.8–1.2)
LYMPHOCYTES # BLD AUTO: 5.84 10*3/MM3 (ref 0.6–4.2)
LYMPHOCYTES NFR BLD AUTO: 42.6 % (ref 10–50)
Lab: NORMAL
MCH RBC QN AUTO: 29.8 PG (ref 26.5–34)
MCHC RBC AUTO-ENTMCNC: 34.5 G/DL (ref 31.5–36.3)
MCV RBC AUTO: 86.2 FL (ref 80–98)
MONOCYTES # BLD AUTO: 0.96 10*3/MM3 (ref 0–0.9)
MONOCYTES NFR BLD AUTO: 7 % (ref 0–12)
NEUTROPHILS # BLD AUTO: 6.13 10*3/MM3 (ref 2–8.6)
NEUTROPHILS NFR BLD AUTO: 44.8 % (ref 37–80)
PLATELET # BLD AUTO: 213 10*3/MM3 (ref 150–450)
PMV BLD AUTO: 11.8 FL (ref 8–12)
POTASSIUM BLD-SCNC: 4.1 MMOL/L (ref 3.5–5.1)
PROT SERPL-MCNC: 6.8 G/DL (ref 6.3–8.6)
PROTHROMBIN TIME: 13.3 SECONDS (ref 11.1–15.3)
RBC # BLD AUTO: 4.87 10*6/MM3 (ref 4.37–5.74)
RH BLD: POSITIVE
SODIUM BLD-SCNC: 136 MMOL/L (ref 137–145)
T&S EXPIRATION DATE: NORMAL
WBC NRBC COR # BLD: 13.71 10*3/MM3 (ref 3.2–9.8)
WHOLE BLOOD HOLD SPECIMEN: NORMAL
WHOLE BLOOD HOLD SPECIMEN: NORMAL

## 2019-02-01 PROCEDURE — 86850 RBC ANTIBODY SCREEN: CPT | Performed by: FAMILY MEDICINE

## 2019-02-01 PROCEDURE — 85610 PROTHROMBIN TIME: CPT | Performed by: FAMILY MEDICINE

## 2019-02-01 PROCEDURE — 93005 ELECTROCARDIOGRAM TRACING: CPT | Performed by: FAMILY MEDICINE

## 2019-02-01 PROCEDURE — 93010 ELECTROCARDIOGRAM REPORT: CPT | Performed by: INTERNAL MEDICINE

## 2019-02-01 PROCEDURE — 85025 COMPLETE CBC W/AUTO DIFF WBC: CPT | Performed by: FAMILY MEDICINE

## 2019-02-01 PROCEDURE — 93005 ELECTROCARDIOGRAM TRACING: CPT

## 2019-02-01 PROCEDURE — 82962 GLUCOSE BLOOD TEST: CPT

## 2019-02-01 PROCEDURE — 86901 BLOOD TYPING SEROLOGIC RH(D): CPT | Performed by: FAMILY MEDICINE

## 2019-02-01 PROCEDURE — 80053 COMPREHEN METABOLIC PANEL: CPT | Performed by: FAMILY MEDICINE

## 2019-02-01 PROCEDURE — 71045 X-RAY EXAM CHEST 1 VIEW: CPT

## 2019-02-01 PROCEDURE — 70450 CT HEAD/BRAIN W/O DYE: CPT

## 2019-02-01 PROCEDURE — 99284 EMERGENCY DEPT VISIT MOD MDM: CPT

## 2019-02-01 PROCEDURE — 86900 BLOOD TYPING SEROLOGIC ABO: CPT | Performed by: FAMILY MEDICINE

## 2019-02-01 RX ORDER — ASPIRIN 81 MG/1
324 TABLET, CHEWABLE ORAL ONCE
Status: COMPLETED | OUTPATIENT
Start: 2019-02-01 | End: 2019-02-01

## 2019-02-01 RX ORDER — CIPROFLOXACIN 500 MG/1
500 TABLET, FILM COATED ORAL
COMMUNITY
Start: 2019-01-25 | End: 2019-02-05

## 2019-02-01 RX ORDER — ASPIRIN 81 MG/1
TABLET, CHEWABLE ORAL
Status: COMPLETED
Start: 2019-02-01 | End: 2019-02-01

## 2019-02-01 RX ORDER — SODIUM CHLORIDE 0.9 % (FLUSH) 0.9 %
10 SYRINGE (ML) INJECTION AS NEEDED
Status: DISCONTINUED | OUTPATIENT
Start: 2019-02-01 | End: 2019-02-02 | Stop reason: HOSPADM

## 2019-02-01 RX ADMIN — ASPIRIN 324 MG: 81 TABLET, CHEWABLE ORAL at 19:36

## 2019-02-01 RX ADMIN — ASPIRIN 81 MG 324 MG: 81 TABLET ORAL at 19:36

## 2019-02-02 NOTE — ED NOTES
Pt reports sudden onset of ringing in both ears at 1700 & sudden onset of numbness in lips & hands at 1730     Kevin Alford RN  02/01/19 1907

## 2019-02-02 NOTE — ED PROVIDER NOTES
Subjective   58-year-old white male presents to emergency department with a complaint of bilateral tendinitis, tingling around his lips, and tinnitis in bilateral fingers.  He states that the tinnitis started at 5pm and the fingers and lips started 30 minutes later.  He is walking well without difficulty.  He states that his arms and legs are working fine.  He has no headache.  He has no vertigo or lightheadedness.            Review of Systems   Constitutional: Negative for chills, diaphoresis, fatigue and fever.   HENT: Negative for nosebleeds, sore throat, trouble swallowing and voice change.    Eyes: Negative for pain and visual disturbance.   Respiratory: Negative for shortness of breath.    Cardiovascular: Negative for chest pain and palpitations.   Gastrointestinal: Negative for vomiting.   Endocrine: Negative for polydipsia and polyuria.   Genitourinary: Negative for difficulty urinating and dysuria.   Musculoskeletal: Negative for arthralgias and back pain.   Skin: Negative for color change and rash.   Allergic/Immunologic: Negative for immunocompromised state.   Neurological: Negative for weakness and numbness.   Hematological: Negative for adenopathy.   Psychiatric/Behavioral: Negative for agitation and confusion.       Past Medical History:   Diagnosis Date   • Diabetes mellitus (CMS/HCC)    • Gout    • Hyperlipemia        No Known Allergies    Past Surgical History:   Procedure Laterality Date   • BACK SURGERY     • CHOLECYSTECTOMY     • COLONOSCOPY N/A 3/14/2017    Procedure: COLONOSCOPY;  Surgeon: Cachorro Perez DO;  Location: [facility] ENDOSCOPY;  Service:    • HAND SURGERY     • LEG SURGERY Right    • NOSE SURGERY     • PANCREAS SURGERY     • SPLENECTOMY         History reviewed. No pertinent family history.    Social History     Socioeconomic History   • Marital status: Single     Spouse name: Not on file   • Number of children: Not on file   • Years of education: Not on file   • Highest education  level: Not on file   Tobacco Use   • Smoking status: Current Every Day Smoker     Packs/day: 1.00     Types: Cigarettes   Substance and Sexual Activity   • Alcohol use: Yes   • Drug use: No   • Sexual activity: Defer           Objective   Physical Exam   Constitutional: He is oriented to person, place, and time. He appears well-developed and well-nourished. No distress.   HENT:   Head: Normocephalic and atraumatic.   Right Ear: External ear normal.   Left Ear: External ear normal.   Mouth/Throat: Oropharynx is clear and moist. No oropharyngeal exudate.   Eyes: Conjunctivae and EOM are normal. Pupils are equal, round, and reactive to light. Right eye exhibits no discharge. Left eye exhibits no discharge. No scleral icterus.   Neck: Neck supple. No JVD present. No tracheal deviation present. No thyromegaly present.   Cardiovascular: Normal rate, regular rhythm, normal heart sounds and intact distal pulses. Exam reveals no friction rub.   No murmur heard.  Pulmonary/Chest: Effort normal and breath sounds normal. No stridor. No respiratory distress. He has no wheezes. He has no rales. He exhibits no tenderness.   Abdominal: Soft. Bowel sounds are normal. He exhibits no distension and no mass. There is no tenderness. There is no rebound and no guarding.   Musculoskeletal: He exhibits no edema, tenderness or deformity.   Lymphadenopathy:     He has no cervical adenopathy.   Neurological: He is alert and oriented to person, place, and time. He has normal reflexes. He is not disoriented. He displays no atrophy and no tremor. No cranial nerve deficit or sensory deficit. He exhibits normal muscle tone. He displays a negative Romberg sign. Coordination and gait normal. GCS eye subscore is 4. GCS verbal subscore is 5. GCS motor subscore is 6.   Reflex Scores:       Patellar reflexes are 2+ on the right side and 2+ on the left side.  Skin: Skin is warm and dry. Capillary refill takes 2 to 3 seconds. No rash noted. He is not  diaphoretic. No erythema.   Psychiatric: He has a normal mood and affect. His behavior is normal. Judgment and thought content normal.   Nursing note and vitals reviewed.      ECG 12 Lead    Date/Time: 2/1/2019 7:37 PM  Performed by: Andres De Santiago DO  Authorized by: Andres De Santiago DO   Interpreted by physician  Comparison: not compared with previous ECG   Rhythm: sinus rhythm  Rate: normal  BPM: 64  QRS axis: normal  Conduction: conduction normal  ST Segments: ST segments normal  T Waves: T waves normal  Other: no other findings  Clinical impression: normal ECG                 ED Course  ED Course as of Feb 01 2140 Fri Feb 01, 2019 2138 Patient was placed in room 4 and evaluated by me.  His physical exam was unremarkable.  Labs and CT of the head were unremarkable except a blood sugar of 56.  He had given himself insulin prior to arrival and felt that his blood sugar was going low.  He is given orange juice in the emergency department.  At this point I believe the patient is stable for discharge and will follow up with his primary care provider as an outpatient.  [CE]      ED Course User Index  [CE] Andres De Santiago DO        Labs Reviewed   COMPREHENSIVE METABOLIC PANEL - Abnormal; Notable for the following components:       Result Value    Glucose 56 (*)     Sodium 136 (*)     Anion Gap 3.0 (*)     All other components within normal limits   CBC WITH AUTO DIFFERENTIAL - Abnormal; Notable for the following components:    WBC 13.71 (*)     Lymphocytes, Absolute 5.84 (*)     Monocytes, Absolute 0.96 (*)     Immature Grans, Absolute 0.04 (*)     All other components within normal limits   POCT GLUCOSE FINGERSTICK - Abnormal; Notable for the following components:    Glucose 61 (*)     All other components within normal limits   PROTIME-INR - Normal    Narrative:     Therapeutic range for most indications is 2.0-3.0 INR,  or 2.5-3.5 for mechanical heart valves.   POCT GLUCOSE FINGERSTICK -  Normal   RAINBOW DRAW    Narrative:     The following orders were created for panel order [place] Draw.  Procedure                               Abnormality         Status                     ---------                               -----------         ------                     Light Blue Top[[id number]]                                   Final result               Green Top (Gel)[[id number]]                                  Final result               Lavender Top[[id number]]                                     Final result               Gold Top - SST[[id number]]                                   Final result                 Please view results for these tests on the individual orders.   POCT GLUCOSE FINGERSTICK   TYPE AND SCREEN   PREVIOUS HISTORY   CBC AND DIFFERENTIAL    Narrative:     The following orders were created for panel order CBC & Differential.  Procedure                               Abnormality         Status                     ---------                               -----------         ------                     CBC Auto Differential[[id number]]        Abnormal            Final result                 Please view results for these tests on the individual orders.   LIGHT BLUE TOP   GREEN TOP   LAVENDER TOP   GOLD TOP - SST     Xr Chest 1 View    Result Date: 2/1/2019  Narrative: EXAM:         Radiograph(s), Chest VIEWS:   Frontal  ; 1     DATE/TIME:  2/1/2019 8:06 PM CST            INDICATION:   Stroke Protocol (onset > 12 hrs)  COMPARISON:  CXR: 8/8/18         FINDINGS:         - lines/tubes:    none   - cardiac:         size within normal limits       - mediastinum: contour within normal limits       - lungs:         no focal air space process, pulmonary interstitial edema, nodule(s)/mass           - pleura:         no evidence of  fluid                - osseous:         unremarkable for age                - misc.:        Impression: CONCLUSION:    1. No evidence of an active cardiopulmonary process.                                                   Electronically signed by:  DAWSON Johnson MD  2/1/2019 8:06 PM CST Workstation: [id number]    Ct Head Without Contrast Stroke Protocol    Result Date: 2/1/2019  Narrative: PROCEDURE: CT HEAD WO CONTRAST STROKE HISTORY: Stroke Indication: Same as above Comparison: None Technique: CT of the head was done without intravenous contrast was done in the orthogonal planes. This exam was performed according to our departmental dose-optimization program, which includes automated exposure control, adjustment of the mA and/or KV according to the patient's size and/or use of iterative reconstruction technique. FINDINGS: There is no intracranial hemorrhage, midline shift mass effect or acute focal infarct. Encephalomalacic change as a result of a small old infarct is noted in the right posterior medial occipital lobe There is periventricular and deep white matter low attenuation, most likely related to small vessel white matter ischemic disease. If clinical concern exists regarding an acute ischemic/vascular pathology being responsible for patient's symptomatology, an MRI of the brain is more sensitive than the current study, in ruling out such a possibility. There is good gray/white matter differentiation. The ventricular system is normal. The mastoid air cells show changes of minimal right sided mastoiditis . The paranasal sinuses are unremarkable . There is no visualization of acute fractures involving the calvarium or the skull base.     Impression: There is no acute intracranial abnormality. Encephalomalacic change as a result of a small old infarct is noted in the right posterior medial occipital lobe Age related mild involutional changes are seen. Electronically signed by:  Travis Ivey MD  2/1/2019 7:22 PM CST Workstation: -[id number]            [facility]      Final diagnoses:   Tinnitus of both ears            Andres De Santiago,   02/01/19 [id number]

## 2019-09-20 PROBLEM — E78.5 HYPERLIPEMIA: Status: ACTIVE | Noted: 2019-09-20

## 2020-01-30 DIAGNOSIS — J44.9 CHRONIC OBSTRUCTIVE PULMONARY DISEASE, UNSPECIFIED COPD TYPE (HCC): ICD-10-CM

## 2020-01-30 RX ORDER — TIOTROPIUM BROMIDE INHALATION SPRAY 3.12 UG/1
SPRAY, METERED RESPIRATORY (INHALATION)
Qty: 4 G | OUTPATIENT
Start: 2020-01-30

## 2020-05-28 ENCOUNTER — HOSPITAL ENCOUNTER (EMERGENCY)
Facility: HOSPITAL | Age: 60
Discharge: HOME | End: 2020-05-28
Attending: EMERGENCY MEDICINE | Admitting: EMERGENCY MEDICINE

## 2020-05-28 VITALS
HEART RATE: 73 BPM | DIASTOLIC BLOOD PRESSURE: 92 MMHG | OXYGEN SATURATION: 100 % | SYSTOLIC BLOOD PRESSURE: 152 MMHG | BODY MASS INDEX: 27.59 KG/M2 | HEIGHT: 74 IN | TEMPERATURE: 97.3 F | WEIGHT: 215 LBS | RESPIRATION RATE: 20 BRPM

## 2020-05-28 DIAGNOSIS — E16.2 HYPOGLYCEMIA: Primary | ICD-10-CM

## 2020-05-28 LAB
GLUCOSE BLDC GLUCOMTR-MCNC: 142 MG/DL (ref 70–130)
GLUCOSE BLDC GLUCOMTR-MCNC: 93 MG/DL (ref 70–130)

## 2020-05-28 PROCEDURE — 99283 EMERGENCY DEPT VISIT LOW MDM: CPT

## 2020-05-28 PROCEDURE — 82962 GLUCOSE BLOOD TEST: CPT

## 2020-08-27 ENCOUNTER — APPOINTMENT (OUTPATIENT)
Dept: CT IMAGING | Facility: HOSPITAL | Age: 60
End: 2020-08-27

## 2020-08-27 ENCOUNTER — HOSPITAL ENCOUNTER (EMERGENCY)
Facility: HOSPITAL | Age: 60
Discharge: ANOTHER HOSPITAL | End: 2020-08-27
Attending: EMERGENCY MEDICINE | Admitting: FAMILY MEDICINE

## 2020-08-27 ENCOUNTER — APPOINTMENT (OUTPATIENT)
Dept: GENERAL RADIOLOGY | Facility: HOSPITAL | Age: 60
End: 2020-08-27

## 2020-08-27 VITALS
TEMPERATURE: 97.6 F | OXYGEN SATURATION: 95 % | BODY MASS INDEX: 29.26 KG/M2 | DIASTOLIC BLOOD PRESSURE: 78 MMHG | SYSTOLIC BLOOD PRESSURE: 174 MMHG | HEIGHT: 74 IN | HEART RATE: 86 BPM | WEIGHT: 228 LBS | RESPIRATION RATE: 24 BRPM

## 2020-08-27 DIAGNOSIS — S22.49XA MULTIPLE RIB FRACTURES INVOLVING FOUR OR MORE RIBS: ICD-10-CM
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
## 2020-08-27 DIAGNOSIS — S22.49XA MULTIPLE RIB FRACTURES INVOLVING FIRST RIB: ICD-10-CM

## 2020-08-27 DIAGNOSIS — S12.100A: ICD-10-CM

## 2020-08-27 LAB
ABO GROUP BLD: NORMAL
ALBUMIN SERPL-MCNC: 4.1 G/DL (ref 3.5–5.2)
ALBUMIN/GLOB SERPL: 1.6 G/DL
ALP SERPL-CCNC: 84 U/L (ref 39–117)
ALT SERPL W P-5'-P-CCNC: 25 U/L (ref 1–41)
AMYLASE SERPL-CCNC: 59 U/L (ref 28–100)
ANION GAP SERPL CALCULATED.3IONS-SCNC: 12 MMOL/L (ref 5–15)
APTT PPP: 26.3 SECONDS (ref 20–40.3)
AST SERPL-CCNC: 27 U/L (ref 1–40)
BASOPHILS # BLD AUTO: 0.11 10*3/MM3 (ref 0–0.2)
BASOPHILS NFR BLD AUTO: 0.5 % (ref 0–1.5)
BILIRUB SERPL-MCNC: 0.2 MG/DL (ref 0–1.2)
BLD GP AB SCN SERPL QL: NEGATIVE
BUN SERPL-MCNC: 22 MG/DL (ref 8–23)
BUN/CREAT SERPL: 24.4 (ref 7–25)
CALCIUM SPEC-SCNC: 9.4 MG/DL (ref 8.6–10.5)
CHLORIDE SERPL-SCNC: 109 MMOL/L (ref 98–107)
CO2 SERPL-SCNC: 23 MMOL/L (ref 22–29)
CREAT SERPL-MCNC: 0.9 MG/DL (ref 0.76–1.27)
D-LACTATE SERPL-SCNC: 2.9 MMOL/L (ref 0.5–2)
DEPRECATED RDW RBC AUTO: 41.3 FL (ref 37–54)
EOSINOPHIL # BLD AUTO: 0.28 10*3/MM3 (ref 0–0.4)
EOSINOPHIL NFR BLD AUTO: 1.3 % (ref 0.3–6.2)
ERYTHROCYTE [DISTWIDTH] IN BLOOD BY AUTOMATED COUNT: 13.4 % (ref 12.3–15.4)
GFR SERPL CREATININE-BSD FRML MDRD: 86 ML/MIN/1.73
GLOBULIN UR ELPH-MCNC: 2.6 GM/DL
GLUCOSE BLDC GLUCOMTR-MCNC: 138 MG/DL (ref 70–130)
GLUCOSE BLDC GLUCOMTR-MCNC: 34 MG/DL (ref 70–130)
GLUCOSE BLDC GLUCOMTR-MCNC: 61 MG/DL (ref 70–130)
GLUCOSE BLDC GLUCOMTR-MCNC: 69 MG/DL (ref 70–130)
GLUCOSE SERPL-MCNC: 61 MG/DL (ref 65–99)
HCT VFR BLD AUTO: 40.9 % (ref 37.5–51)
HGB BLD-MCNC: 14.1 G/DL (ref 13–17.7)
HOLD SPECIMEN: NORMAL
HOLD SPECIMEN: NORMAL
IMM GRANULOCYTES # BLD AUTO: 0.33 10*3/MM3 (ref 0–0.05)
IMM GRANULOCYTES NFR BLD AUTO: 1.5 % (ref 0–0.5)
LACTATE HOLD SPECIMEN: NORMAL
LIPASE SERPL-CCNC: 40 U/L (ref 13–60)
LYMPHOCYTES # BLD AUTO: 5.37 10*3/MM3 (ref 0.7–3.1)
LYMPHOCYTES NFR BLD AUTO: 25 % (ref 19.6–45.3)
Lab: NORMAL
MCH RBC QN AUTO: 29.3 PG (ref 26.6–33)
MCHC RBC AUTO-ENTMCNC: 34.5 G/DL (ref 31.5–35.7)
MCV RBC AUTO: 84.9 FL (ref 79–97)
MONOCYTES # BLD AUTO: 1.37 10*3/MM3 (ref 0.1–0.9)
MONOCYTES NFR BLD AUTO: 6.4 % (ref 5–12)
NEUTROPHILS NFR BLD AUTO: 14 10*3/MM3 (ref 1.7–7)
NEUTROPHILS NFR BLD AUTO: 65.3 % (ref 42.7–76)
NRBC BLD AUTO-RTO: 0 /100 WBC (ref 0–0.2)
NT-PROBNP SERPL-MCNC: 17.8 PG/ML (ref 0–900)
PLAT MORPH BLD: NORMAL
PLATELET # BLD AUTO: 227 10*3/MM3 (ref 140–450)
PMV BLD AUTO: 10.7 FL (ref 6–12)
POTASSIUM SERPL-SCNC: 2.9 MMOL/L (ref 3.5–5.2)
PROT SERPL-MCNC: 6.7 G/DL (ref 6–8.5)
RBC # BLD AUTO: 4.82 10*6/MM3 (ref 4.14–5.8)
RBC MORPH BLD: NORMAL
RH BLD: POSITIVE
SODIUM SERPL-SCNC: 144 MMOL/L (ref 136–145)
T&S EXPIRATION DATE: NORMAL
TROPONIN T SERPL-MCNC: <0.01 NG/ML (ref 0–0.03)
WBC # BLD AUTO: 21.46 10*3/MM3 (ref 3.4–10.8)
WBC MORPH BLD: NORMAL
WHOLE BLOOD HOLD SPECIMEN: NORMAL
WHOLE BLOOD HOLD SPECIMEN: NORMAL

## 2020-08-27 PROCEDURE — 86901 BLOOD TYPING SEROLOGIC RH(D): CPT | Performed by: EMERGENCY MEDICINE

## 2020-08-27 PROCEDURE — 85730 THROMBOPLASTIN TIME PARTIAL: CPT | Performed by: EMERGENCY MEDICINE

## 2020-08-27 PROCEDURE — 25010000002 HYDROMORPHONE 1 MG/ML SOLUTION: Performed by: FAMILY MEDICINE

## 2020-08-27 PROCEDURE — 25010000002 ONDANSETRON PER 1 MG: Performed by: EMERGENCY MEDICINE

## 2020-08-27 PROCEDURE — 25010000002 IOPAMIDOL 61 % SOLUTION: Performed by: EMERGENCY MEDICINE

## 2020-08-27 PROCEDURE — 71260 CT THORAX DX C+: CPT

## 2020-08-27 PROCEDURE — 84484 ASSAY OF TROPONIN QUANT: CPT | Performed by: EMERGENCY MEDICINE

## 2020-08-27 PROCEDURE — 74177 CT ABD & PELVIS W/CONTRAST: CPT

## 2020-08-27 PROCEDURE — 85025 COMPLETE CBC W/AUTO DIFF WBC: CPT | Performed by: EMERGENCY MEDICINE

## 2020-08-27 PROCEDURE — 70450 CT HEAD/BRAIN W/O DYE: CPT

## 2020-08-27 PROCEDURE — 99284 EMERGENCY DEPT VISIT MOD MDM: CPT

## 2020-08-27 PROCEDURE — 90471 IMMUNIZATION ADMIN: CPT | Performed by: EMERGENCY MEDICINE

## 2020-08-27 PROCEDURE — 72128 CT CHEST SPINE W/O DYE: CPT

## 2020-08-27 PROCEDURE — 86900 BLOOD TYPING SEROLOGIC ABO: CPT | Performed by: EMERGENCY MEDICINE

## 2020-08-27 PROCEDURE — 82150 ASSAY OF AMYLASE: CPT | Performed by: EMERGENCY MEDICINE

## 2020-08-27 PROCEDURE — 86850 RBC ANTIBODY SCREEN: CPT | Performed by: EMERGENCY MEDICINE

## 2020-08-27 PROCEDURE — 85007 BL SMEAR W/DIFF WBC COUNT: CPT | Performed by: EMERGENCY MEDICINE

## 2020-08-27 PROCEDURE — 80053 COMPREHEN METABOLIC PANEL: CPT | Performed by: EMERGENCY MEDICINE

## 2020-08-27 PROCEDURE — 82962 GLUCOSE BLOOD TEST: CPT

## 2020-08-27 PROCEDURE — 83690 ASSAY OF LIPASE: CPT | Performed by: EMERGENCY MEDICINE

## 2020-08-27 PROCEDURE — 96361 HYDRATE IV INFUSION ADD-ON: CPT

## 2020-08-27 PROCEDURE — 72125 CT NECK SPINE W/O DYE: CPT

## 2020-08-27 PROCEDURE — 71045 X-RAY EXAM CHEST 1 VIEW: CPT

## 2020-08-27 PROCEDURE — 25010000002 TDAP 5-2.5-18.5 LF-MCG/0.5 SUSPENSION: Performed by: EMERGENCY MEDICINE

## 2020-08-27 PROCEDURE — 83605 ASSAY OF LACTIC ACID: CPT | Performed by: EMERGENCY MEDICINE

## 2020-08-27 PROCEDURE — 25010000002 FENTANYL CITRATE (PF) 100 MCG/2ML SOLUTION: Performed by: EMERGENCY MEDICINE

## 2020-08-27 PROCEDURE — 83880 ASSAY OF NATRIURETIC PEPTIDE: CPT | Performed by: EMERGENCY MEDICINE

## 2020-08-27 PROCEDURE — 25010000003 POTASSIUM CHLORIDE 10 MEQ/100ML SOLUTION: Performed by: FAMILY MEDICINE

## 2020-08-27 PROCEDURE — 90715 TDAP VACCINE 7 YRS/> IM: CPT | Performed by: EMERGENCY MEDICINE

## 2020-08-27 PROCEDURE — 93010 ELECTROCARDIOGRAM REPORT: CPT | Performed by: INTERNAL MEDICINE

## 2020-08-27 PROCEDURE — 96375 TX/PRO/DX INJ NEW DRUG ADDON: CPT

## 2020-08-27 PROCEDURE — 96374 THER/PROPH/DIAG INJ IV PUSH: CPT

## 2020-08-27 PROCEDURE — 93005 ELECTROCARDIOGRAM TRACING: CPT | Performed by: EMERGENCY MEDICINE

## 2020-08-27 RX ORDER — DEXTROSE AND SODIUM CHLORIDE 5; .45 G/100ML; G/100ML
125 INJECTION, SOLUTION INTRAVENOUS CONTINUOUS
Status: DISCONTINUED | OUTPATIENT
Start: 2020-08-27 | End: 2020-08-27 | Stop reason: HOSPADM

## 2020-08-27 RX ORDER — DEXTROSE MONOHYDRATE 25 G/50ML
INJECTION, SOLUTION INTRAVENOUS
Status: COMPLETED
Start: 2020-08-27 | End: 2020-08-27

## 2020-08-27 RX ORDER — DEXTROSE MONOHYDRATE AND SODIUM CHLORIDE 5; .3 G/100ML; G/100ML
125 INJECTION, SOLUTION INTRAVENOUS CONTINUOUS
Status: DISCONTINUED | OUTPATIENT
Start: 2020-08-27 | End: 2020-08-27

## 2020-08-27 RX ORDER — ONDANSETRON 2 MG/ML
4 INJECTION INTRAMUSCULAR; INTRAVENOUS ONCE
Status: COMPLETED | OUTPATIENT
Start: 2020-08-27 | End: 2020-08-27

## 2020-08-27 RX ORDER — DEXTROSE MONOHYDRATE 25 G/50ML
50 INJECTION, SOLUTION INTRAVENOUS
Status: DISCONTINUED | OUTPATIENT
Start: 2020-08-27 | End: 2020-08-27 | Stop reason: HOSPADM

## 2020-08-27 RX ORDER — FENTANYL CITRATE 50 UG/ML
50 INJECTION, SOLUTION INTRAMUSCULAR; INTRAVENOUS
Status: COMPLETED | OUTPATIENT
Start: 2020-08-27 | End: 2020-08-27

## 2020-08-27 RX ORDER — FENTANYL CITRATE 50 UG/ML
50 INJECTION, SOLUTION INTRAMUSCULAR; INTRAVENOUS ONCE
Status: DISCONTINUED | OUTPATIENT
Start: 2020-08-27 | End: 2020-08-27

## 2020-08-27 RX ORDER — POTASSIUM CHLORIDE 7.45 MG/ML
10 INJECTION INTRAVENOUS ONCE
Status: COMPLETED | OUTPATIENT
Start: 2020-08-27 | End: 2020-08-27

## 2020-08-27 RX ORDER — ONDANSETRON 2 MG/ML
4 INJECTION INTRAMUSCULAR; INTRAVENOUS ONCE
Status: DISCONTINUED | OUTPATIENT
Start: 2020-08-27 | End: 2020-08-27

## 2020-08-27 RX ADMIN — DEXTROSE MONOHYDRATE 50 ML: 25 INJECTION, SOLUTION INTRAVENOUS at 06:30

## 2020-08-27 RX ADMIN — HYDROMORPHONE HYDROCHLORIDE 1 MG: 1 INJECTION, SOLUTION INTRAMUSCULAR; INTRAVENOUS; SUBCUTANEOUS at 07:57

## 2020-08-27 RX ADMIN — TETANUS TOXOID, REDUCED DIPHTHERIA TOXOID AND ACELLULAR PERTUSSIS VACCINE, ADSORBED 0.5 ML: 5; 2.5; 8; 8; 2.5 SUSPENSION INTRAMUSCULAR at 06:24

## 2020-08-27 RX ADMIN — DEXTROSE MONOHYDRATE 50 ML: 25 INJECTION, SOLUTION INTRAVENOUS at 08:39

## 2020-08-27 RX ADMIN — DEXTROSE AND SODIUM CHLORIDE 125 ML/HR: 5; 450 INJECTION, SOLUTION INTRAVENOUS at 08:24

## 2020-08-27 RX ADMIN — FENTANYL CITRATE 50 MCG: 50 INJECTION, SOLUTION INTRAMUSCULAR; INTRAVENOUS at 06:24

## 2020-08-27 RX ADMIN — IOPAMIDOL 90 ML: 612 INJECTION, SOLUTION INTRAVENOUS at 06:46

## 2020-08-27 RX ADMIN — POTASSIUM CHLORIDE 10 MEQ: 7.46 INJECTION, SOLUTION INTRAVENOUS at 08:41

## 2020-08-27 RX ADMIN — DEXTROSE MONOHYDRATE 50 ML: 25 INJECTION, SOLUTION INTRAVENOUS at 07:35

## 2020-08-27 RX ADMIN — ONDANSETRON 4 MG: 2 INJECTION INTRAMUSCULAR; INTRAVENOUS at 06:23

## 2020-08-27 RX ADMIN — SODIUM CHLORIDE 1000 ML: 9 INJECTION, SOLUTION INTRAVENOUS at 06:23

## 2020-09-09 DIAGNOSIS — J44.9 CHRONIC OBSTRUCTIVE PULMONARY DISEASE, UNSPECIFIED COPD TYPE (HCC): ICD-10-CM

## 2020-09-09 RX ORDER — TIOTROPIUM BROMIDE INHALATION SPRAY 3.12 UG/1
SPRAY, METERED RESPIRATORY (INHALATION)
Qty: 4 G | OUTPATIENT
Start: 2020-09-09

## 2022-03-10 ENCOUNTER — OFFICE VISIT (OUTPATIENT)
Dept: SLEEP MEDICINE | Facility: HOSPITAL | Age: 62
End: 2022-03-10

## 2022-03-10 VITALS
HEART RATE: 84 BPM | BODY MASS INDEX: 24.38 KG/M2 | DIASTOLIC BLOOD PRESSURE: 72 MMHG | WEIGHT: 190 LBS | HEIGHT: 74 IN | OXYGEN SATURATION: 99 % | SYSTOLIC BLOOD PRESSURE: 116 MMHG

## 2022-03-10 DIAGNOSIS — R06.83 SNORING: Primary | ICD-10-CM

## 2022-03-10 DIAGNOSIS — F51.04 PSYCHOPHYSIOLOGICAL INSOMNIA: ICD-10-CM

## 2022-03-10 DIAGNOSIS — R06.81 WITNESSED EPISODE OF APNEA: ICD-10-CM

## 2022-03-10 DIAGNOSIS — G47.00 FREQUENT NOCTURNAL AWAKENING: ICD-10-CM

## 2022-03-10 DIAGNOSIS — F17.210 CIGARETTE NICOTINE DEPENDENCE, UNCOMPLICATED: ICD-10-CM

## 2022-03-10 DIAGNOSIS — G47.19 EXCESSIVE DAYTIME SLEEPINESS: ICD-10-CM

## 2022-03-10 PROCEDURE — 99203 OFFICE O/P NEW LOW 30 MIN: CPT | Performed by: NURSE PRACTITIONER

## 2022-03-10 RX ORDER — SIMVASTATIN 10 MG
1 TABLET ORAL
Status: ON HOLD | COMMUNITY
End: 2023-04-04

## 2022-03-10 RX ORDER — CALCIUM POLYCARBOPHIL 625 MG
2 TABLET ORAL DAILY
Status: ON HOLD | COMMUNITY
Start: 2021-11-11

## 2022-03-10 RX ORDER — ATORVASTATIN CALCIUM 20 MG/1
20 TABLET, FILM COATED ORAL
COMMUNITY
Start: 2021-03-16 | End: 2022-03-17

## 2022-03-10 RX ORDER — ERGOCALCIFEROL 1.25 MG/1
50000 CAPSULE ORAL WEEKLY
Status: ON HOLD | COMMUNITY
Start: 2022-01-31

## 2022-03-10 RX ORDER — ASPIRIN 81 MG/1
81 TABLET ORAL DAILY
COMMUNITY
End: 2022-03-10 | Stop reason: SDUPTHER

## 2022-03-10 RX ORDER — DAPAGLIFLOZIN 5 MG/1
10 TABLET, FILM COATED ORAL DAILY
Status: ON HOLD | COMMUNITY
Start: 2022-02-07

## 2022-03-10 RX ORDER — CYANOCOBALAMIN 1000 UG/ML
INJECTION, SOLUTION INTRAMUSCULAR; SUBCUTANEOUS
Status: ON HOLD | COMMUNITY
Start: 2022-01-31 | End: 2023-04-04

## 2022-03-10 RX ORDER — OMEPRAZOLE 40 MG/1
40 CAPSULE, DELAYED RELEASE ORAL DAILY
Status: ON HOLD | COMMUNITY
Start: 2022-02-05

## 2022-03-10 NOTE — PROGRESS NOTES
New Patient Sleep Medicine Consultation    Encounter Date: 3/10/2022         Patient's Primary Care Provider: Guanako Atkins MD  Referring Provider: No ref. provider found  Reason for consultation/chief complaint: snoring, witnessed apneas, excessive daytime sleepiness and unrefreshing sleep    Sandoval Navarro is a 61 y.o. male who admits to snoring, unrestful sleep, excessive daytime sleepiness, stopping breathing during sleep, memory loss, difficulty falling asleep and taking medicine to help go to sleep.    He denies cataplexy, sleep paralysis, or hypnagogic hallucinations. His bedtime is ~ 2200. He  falls asleep after  minutes, and is up 3 times per night. He wakes up ~ 5921-3077. He endorses 4 hours of sleep. He drinks 0 cups of coffee, 0 teas, and 1-2 L sodas per day. He drinks 0 alcoholic beverages per week. He is a current smoker. He does report that he takes a medication for sleep prescribed by his PCP but he is unsure of the name (thinks it is 30 mg). He has no sleepiness with driving. He very rarely naps.    Patient was evaluated here in 2018 and was recommended to undergo home sleep testing, however, he never completed it.    He has a diagnosis of mild COPD. Last PFT in 2018.  Pulmonary Function Test Spirometry pre and post bronchodilator, Lung volumes, DLCO: Patient Communication    Release  Not seen     Results  Pulmonary Function Test Spirometry pre and post bronchodilator, Lung volumes, DLCO (Order [id number])    Author: -- Service: -- Author Type: --   Filed:  Date of Service:  Creation Time:    Status: (Other)   Pulmonary Function Test Interpretation  Sandoval Navarro  [id number]  1960     Study date: 8/10/18     Spirometry  Spirometry demonstrates no airway obstruction.     Post Bronchodilator  Following the inhalation of a bronchodilator, there is no significant change in airway mechanics.     Lung Volume  Lung volumes are normal. and Lung volumes were determined by inert gas  methodologies, which demonstrated good intrapulmonary gas mixing.     Diffusion  The diffusing capacity for carbon monoxide (DLCO), a reflection of alveolar-capillary gas transport, appears to be mildly reduced. However, hemoglobin (Hb) levels are not available in the pulmonary function laboratory the thus the potential effect of anemia on reducing carbon monoxide uptake cannot be ruled out.     Study Comparison  N/A     Conclusion  Normal spirometry and lung volumes.  Mildly reduced diffusing capacity.          This document has been electronically signed by Merle Helton MD on August 13, 2018 8:49 AM            [place] - 4    Prior Sleep Testing: None    Past Medical History:   Diagnosis Date   • Diabetes mellitus (HCC)    • Gout    • Hyperlipemia      Social History     Socioeconomic History   • Marital status: Single   Tobacco Use   • Smoking status: Current Every Day Smoker     Packs/day: 1.00     Types: Cigarettes   Substance and Sexual Activity   • Alcohol use: Yes   • Drug use: No   • Sexual activity: Defer     History reviewed. No pertinent family history.     Prior T&A, UPPP, maxillofacial, or bariatric surgery: None  Family history of sleep disorders: Brother - ALICE on CPAP; nephew - ALICE on CPAP; girlfriend - ALICE on CPAP  Other family history + for: None  Occupation: Retired   Marital status: Single, has girlfriend  Children: 1  Has 8 brothers and 3 sisters  Smoking history: smoked 1 ppds from age 10 until present      Review of Systems:  Constitutional: positive for fatigue  Eyes: negative  Ears, nose, mouth, throat, and face: positive for snoring  Respiratory: positive for chronic bronchitis and dyspnea on exertion  Cardiovascular: positive for exertional chest pressure/discomfort  Gastrointestinal: negative  Genitourinary:negative  Integument/breast: negative  Hematologic/lymphatic: negative  Musculoskeletal:positive for back pain, neck pain and stiff joints  Neurological:  "negative  Behavioral/Psych: negative  Endocrine: negative  Allergic/Immunologic: negative   Patient advised to discuss any positive ROS with PCP.      Vitals:    03/10/22 1037   BP: 116/72   Pulse: 84   SpO2: 99%           03/10/22  1037   Weight: 86.2 kg (190 lb)       Body mass index is 24.38 kg/m². Patient's Body mass index is 24.38 kg/m². indicating that he is within normal range (BMI 18.5-24.9). No BMI management plan needed.      Tobacco Use: High Risk   • Smoking Tobacco Use: Current Every Day Smoker   • Smokeless Tobacco Use: Unknown       Physical Exam:        General: Alert. Cooperative. Well developed. No acute distress.   Head/Neck:  Normocephalic. Symmetrical. Atraumatic.     Neck circumference: 15\"             Eyes: Sclera clear. No icterus. PERRLA. Normal EOM.             Ears: No deformities. Normal hearing.             Nose: No septal deviation. No drainage.          Throat: No oral lesions. No thrush. Moist mucous membranes. Trachea midline.    Tongue is normal     Dentition is upper and lower dentures       Pharynx: Posterior pharyngeal pillars are wide    Mallampati score of IV (only hard palate visible)    Pharynx is nonerythematous, with both tonsils mildly enlarged, elongated uvula   Chest Wall:  Normal shape. Symmetric expansion with respiration. No tenderness.          Lungs:  Clear to auscultation bilaterally. No wheezes. No rhonchi. No rales. Respirations regular, even and unlabored.            Heart:  Regular rhythm and normal rate. Normal S1 and S2. No murmur.     Abdomen:  Soft, non-tender and non-distended. Normal bowel sounds. No masses.  Extremities:  Moves all extremities well. No edema.           Pulses: Pulses palpable and equal bilaterally.               Skin: Dry. Intact. No bleeding. No rash.           Neuro: Moves all 4 extremities and cranial nerves grossly intact.  Psychiatric: Normal mood and affect.      Current Outpatient Medications:   •  albuterol (PROVENTIL " HFA;VENTOLIN HFA) 108 (90 Base) MCG/ACT inhaler, Inhale 2 puffs Every 4 (Four) Hours As Needed for Wheezing., Disp: 18 g, Rfl: 11  •  aspirin 81 MG chewable tablet, Chew 81 mg Daily., Disp: , Rfl:   •  calcium polycarbophil (FiberCon) 625 MG tablet, Take 2 tablets by mouth Daily., Disp: , Rfl:   •  Continuous Blood Gluc Sensor (FreeStyle Fina 2 Sensor) mis, REPLACE SENSOR E 14 DAYS, Disp: , Rfl:   •  cyanocobalamin 1000 MCG/ML injection, , Disp: , Rfl:   •  Farxiga 5 MG tablet tablet, , Disp: , Rfl:   •  glucose blood test strip, USE TO TEST BLOOD SUGAR 3 TIMES A DAY, Disp: , Rfl:   •  insulin aspart (novoLOG FLEXPEN) 100 UNIT/ML solution pen-injector sc pen, Up 15u sq tid, Disp: , Rfl:   •  Insulin Glargine (BASAGLAR KWIKPEN) 100 UNIT/ML injection pen, INJECT 40 UNITS EVERY MORNING AND 45 UNITS EVERY EVENING, Disp: , Rfl: 5  •  linaclotide (LINZESS) 145 MCG capsule capsule, Take 145 mcg by mouth., Disp: , Rfl:   •  magnesium oxide (MAG-OX) 400 MG tablet, Take 400 mg by mouth 2 (Two) Times a Day., Disp: , Rfl:   •  Pancrelipase, Lip-Prot-Amyl, (CREON) 57286-700587 units capsule delayed-release particles capsule, Take  by mouth Daily., Disp: , Rfl:   •  tamsulosin (FLOMAX) 0.4 MG capsule 24 hr capsule, Take 1 capsule by mouth Daily., Disp: , Rfl:   •  tiotropium bromide monohydrate (SPIRIVA RESPIMAT) 2.5 MCG/ACT aerosol solution inhaler, Inhale 2 puffs Daily., Disp: 1 inhaler, Rfl: 11  •  vitamin D (ERGOCALCIFEROL) 1.25 MG (38995 UT) capsule capsule, Take 50,000 Units by mouth 1 (One) Time Per Week., Disp: , Rfl:   •  atorvastatin (LIPITOR) 20 MG tablet, Take 20 mg by mouth., Disp: , Rfl:   •  meloxicam (MOBIC) 15 MG tablet, Take 15 mg by mouth Daily., Disp: , Rfl:   •  nicotine (NICODERM CQ) 14 MG/24HR patch, Place 1 patch on the skin Daily., Disp: 15 patch, Rfl: 2  •  omeprazole (priLOSEC) 40 MG capsule, Take 40 mg by mouth Daily., Disp: , Rfl:   •  simvastatin (ZOCOR) 10 MG tablet, Take 10 mg by mouth Every  Night., Disp: , Rfl:   •  simvastatin (ZOCOR) 10 MG tablet, Take 1 tablet by mouth., Disp: , Rfl:     WBC   Date Value Ref Range Status   09/02/2020 12.2 (H) 4.1 - 10.9 THOUS/uL Final     RBC   Date Value Ref Range Status   09/02/2020 5.10 3.35 - 5.50 MIL/uL Final     Hemoglobin   Date Value Ref Range Status   09/02/2020 14.9 12.9 - 16.6 GM/DL Final   02/28/2019 15 14.4 - 16.6 g/dL Final     Hematocrit   Date Value Ref Range Status   09/02/2020 45.5 38.0 - 48.0 % Final     MCV   Date Value Ref Range Status   09/02/2020 89.2 81.0 - 95.0 FL Final     MCH   Date Value Ref Range Status   09/02/2020 29.2 27.0 - 33.0 PG Final     MCHC   Date Value Ref Range Status   09/02/2020 32.7 (L) 33.0 - 37.0 G/DL Final     RDW   Date Value Ref Range Status   09/02/2020 13.5 11.5 - 14.5 % Final     RDW-SD   Date Value Ref Range Status   09/02/2020 43.8 (H) 35.0 - 42.0 FL Final     MPV   Date Value Ref Range Status   09/02/2020 11.0 (H) 7.4 - 10.4 FL Final     Platelets   Date Value Ref Range Status   09/02/2020 292 130 - 400 THOUS/uL Final     Neutrophil Rel %   Date Value Ref Range Status   09/02/2020 63.2 42.2 - 75.2 % Final     Lymphocyte Rel %   Date Value Ref Range Status   09/02/2020 24.8 20.5 - 51.1 % Final     Monocyte Rel %   Date Value Ref Range Status   09/02/2020 8.3 1.7 - 9.3 % Final     Eosinophil Rel %   Date Value Ref Range Status   02/28/2019 2.4 0 - 5 % Final     Eosinophil %   Date Value Ref Range Status   09/02/2020 2.2 1.0 - 3.0 % Final     Basophil Rel %   Date Value Ref Range Status   09/02/2020 1.0 0.0 - 2.0 % Final     Immature Grans %   Date Value Ref Range Status   09/02/2020 0.5 (H) 0.0 - 0.4 % Final     Comment:     IG PARAMETER REFLECTS THE  COMBINATION OF METAMYELOCYTES,  MYELOCYTES, AND PROMYELOCYTES.     Neutrophils Absolute   Date Value Ref Range Status   09/02/2020 7.7 1.7 - 8.7 THOUS/uL Final     Lymphocytes Absolute   Date Value Ref Range Status   09/02/2020 3.0 0.8 - 5.6 THOUS/uL Final      Monocytes Absolute   Date Value Ref Range Status   09/02/2020 1.0 0.1 - 1.0 THOUS/uL Final     Eosinophils Absolute   Date Value Ref Range Status   09/02/2020 0.3 0.0 - 0.3 THOUS/uL Final     Basophils Absolute   Date Value Ref Range Status   09/02/2020 0.1 0.0 - 0.2 THOUS/uL Final     Immature Grans, Absolute   Date Value Ref Range Status   09/02/2020 0.06 (H) 0.00 - 0.04 THOUS/uL Final     nRBC   Date Value Ref Range Status   09/02/2020 0.0 0 /100 WBC'S Final   08/27/2020 0.0 0.0 - 0.2 /100 WBC Final     Iron   Date Value Ref Range Status   08/08/2018 74 49 - 181 mcg/dL Final     Lab Results   Component Value Date    GLUCOSE 61 (L) 08/27/2020    BUN 18 01/25/2021    CREATININE 0.9 01/25/2021    EGFRIFNONA 86 08/27/2020    EGFRIFAFRI 103 09/09/2021    BCR 24.4 08/27/2020    K 3.4 (L) 01/25/2021    CO2 29 01/25/2021    CALCIUM 10.0 01/25/2021    ALBUMIN 4.10 08/27/2020    AST 27 08/27/2020    ALT 25 08/27/2020       Contraindications to home sleep test: none    ASSESSMENT:  1. Excessive daytime sleepiness, presumed obstructive sleep apnea - New (to me), additional work-up planned (4)  1. Check home sleep study   2. Follow up for results  2. Snoring, presumed obstructive sleep apnea - New (to me), additional work-up planned (4)  1. As above  3. Frequent nocturnal awakenings - New (to me), additional work-up planned (4)  1. As above  4. Witnessed apnea -  New (to me), additional work-up planned (4)  1. As above  5. Insomnia - sleep onset/maintenance - New (to me), additional work-up planned (4)    1.   Continue medication prescribed by PCP for now   2.   Address at follow up  6. Nicotine dependence without complication - stable chronic illness   1.   Smoking cessation information provided. Smoking and tobacco use cessation counseling visit was 3.5 minutes      I spent 30 minutes caring for Sandoval on this date of service. This time includes time spent by me in the following activities: preparing for the visit,  reviewing tests, obtaining and/or reviewing a separately obtained history, performing a medically appropriate examination and/or evaluation , counseling and educating the patient/family/caregiver, ordering medications, tests, or procedures, documenting information in the medical record and care coordination; discussing PAP therapy, Tobacco cessation and Further testing    RTC 2 weeks after testing. Patient agrees to return sooner if changes in symptoms.         This document has been electronically signed by NIKIA Li on March 10, 2022 10:41 CST          CC: Guanako Atkins MD          No ref. provider found

## 2022-03-23 ENCOUNTER — HOSPITAL ENCOUNTER (OUTPATIENT)
Dept: SLEEP MEDICINE | Facility: HOSPITAL | Age: 62
Discharge: HOME | End: 2022-03-23
Admitting: NURSE PRACTITIONER

## 2022-03-23 DIAGNOSIS — R06.83 SNORING: ICD-10-CM

## 2022-03-23 DIAGNOSIS — R06.81 WITNESSED EPISODE OF APNEA: ICD-10-CM

## 2022-03-23 DIAGNOSIS — G47.00 FREQUENT NOCTURNAL AWAKENING: ICD-10-CM

## 2022-03-23 DIAGNOSIS — G47.19 EXCESSIVE DAYTIME SLEEPINESS: ICD-10-CM

## 2022-03-23 PROCEDURE — 95800 SLP STDY UNATTENDED: CPT

## 2022-03-23 PROCEDURE — 95800 SLP STDY UNATTENDED: CPT | Performed by: PSYCHIATRY & NEUROLOGY

## 2022-04-05 ENCOUNTER — OFFICE VISIT (OUTPATIENT)
Dept: SLEEP MEDICINE | Facility: HOSPITAL | Age: 62
End: 2022-04-05

## 2022-04-05 VITALS
SYSTOLIC BLOOD PRESSURE: 113 MMHG | DIASTOLIC BLOOD PRESSURE: 71 MMHG | BODY MASS INDEX: 23.78 KG/M2 | HEART RATE: 89 BPM | WEIGHT: 185.3 LBS | OXYGEN SATURATION: 99 % | HEIGHT: 74 IN

## 2022-04-05 DIAGNOSIS — G47.33 OBSTRUCTIVE SLEEP APNEA, ADULT: Primary | ICD-10-CM

## 2022-04-05 PROCEDURE — 99213 OFFICE O/P EST LOW 20 MIN: CPT | Performed by: NURSE PRACTITIONER

## 2022-04-05 NOTE — PROGRESS NOTES
Sleep Clinic Follow Up - Sleep Study Results    CHIEF COMPLAINT: follow up sleep testing    LAST OV: 03/10/2022    HPI:  The patient is a 61 y.o. male.  I discussed the results of the recent home sleep study performed on 03/23/2022.  The AHI/YADIRA was 3, RDI was 17. Pulse range of 36-96 bpm. O2 mayco of 89% with no sustained hypoxia.      INTERVAL MEDICAL HISTORY: Patient thinks he might have had a stroke in the past.       Review of Systems:  Denies chest pain, shortness of breath, leg swelling, cough, fever, chills, abdominal pain, N/V/D.    MEDICATIONS:   Current Outpatient Medications:   •  albuterol (PROVENTIL HFA;VENTOLIN HFA) 108 (90 Base) MCG/ACT inhaler, Inhale 2 puffs Every 4 (Four) Hours As Needed for Wheezing., Disp: 18 g, Rfl: 11  •  aspirin 81 MG chewable tablet, Chew 81 mg Daily., Disp: , Rfl:   •  calcium polycarbophil (FiberCon) 625 MG tablet, Take 2 tablets by mouth Daily., Disp: , Rfl:   •  Continuous Blood Gluc Sensor (FreeStyle Fina 2 Sensor) misc, REPLACE SENSOR E 14 DAYS, Disp: , Rfl:   •  cyanocobalamin 1000 MCG/ML injection, , Disp: , Rfl:   •  Farxiga 5 MG tablet tablet, Take 10 mg by mouth Daily., Disp: , Rfl:   •  glucose blood test strip, USE TO TEST BLOOD SUGAR 3 TIMES A DAY, Disp: , Rfl:   •  insulin aspart (novoLOG FLEXPEN) 100 UNIT/ML solution pen-injector sc pen, Up 15u sq tid, Disp: , Rfl:   •  Insulin Glargine (BASAGLAR KWIKPEN) 100 UNIT/ML injection pen, Inject 48 Units under the skin into the appropriate area as directed Daily., Disp: , Rfl: 5  •  linaclotide (LINZESS) 145 MCG capsule capsule, Take 145 mcg by mouth., Disp: , Rfl:   •  magnesium oxide (MAG-OX) 400 MG tablet, Take 400 mg by mouth 2 (Two) Times a Day., Disp: , Rfl:   •  meloxicam (MOBIC) 15 MG tablet, Take 15 mg by mouth Daily., Disp: , Rfl:   •  nicotine (NICODERM CQ) 14 MG/24HR patch, Place 1 patch on the skin Daily., Disp: 15 patch, Rfl: 2  •  omeprazole (priLOSEC) 40 MG capsule, Take 40 mg by mouth Daily., Disp:  , Rfl:   •  Pancrelipase, Lip-Prot-Amyl, (CREON) 60407-158041 units capsule delayed-release particles capsule, Take  by mouth Daily., Disp: , Rfl:   •  simvastatin (ZOCOR) 10 MG tablet, Take 10 mg by mouth Every Night., Disp: , Rfl:   •  simvastatin (ZOCOR) 10 MG tablet, Take 1 tablet by mouth., Disp: , Rfl:   •  tamsulosin (FLOMAX) 0.4 MG capsule 24 hr capsule, Take 1 capsule by mouth Daily., Disp: , Rfl:   •  tiotropium bromide monohydrate (SPIRIVA RESPIMAT) 2.5 MCG/ACT aerosol solution inhaler, Inhale 2 puffs Daily., Disp: 1 inhaler, Rfl: 11  •  vitamin D (ERGOCALCIFEROL) 1.25 MG (33137 UT) capsule capsule, Take 50,000 Units by mouth 1 (One) Time Per Week., Disp: , Rfl:     PHYSICAL EXAM:      Vitals:    04/05/22 0925   BP: 113/71   Pulse: 89   SpO2: 99%     Patient's Body mass index is 23.78 kg/m². indicating that he is within normal range (BMI 18.5-24.9). No BMI management plan needed.      Gen:                No distress, conversant, pleasant, appears stated age, alert, oriented  Eyes:               Anicteric sclera, moist conjunctiva, no lid lag                           PERRL, EOMI   Heent:             NC/AT                          Oropharynx clear                          Normal hearing  Lungs:             Normal effort, non-labored breathing                          Clear to auscultation bilaterally          CV:                  Normal S1/S2, no murmur                          No lower extremity edema  ABD:               Soft, rounded, non-distended                          Normal bowel sounds                    Psych:             Appropriate affect  Neuro:             CN 2-12 appear intact      Assessment and Plan:    1. Obstructive sleep apnea - New (to me), no additional work-up planned (3)  1. The sleep study results were discussed in detail with the patient. The risks of untreated sleep apnea were reviewed. Treatment options for sleep apnea were discussed. I counseled patient on sleep hygiene,  including regular sleep wake schedule and stimulus control therapy, the importance of weight reduction, and abstaining from smoking and alcohol consumption  2. Trial autoCPAP 5-15 cm H2O with mask per patient choice ([facility])  3. Follow up in 30-90 days with compliance report, or sooner if trouble with PAP adaptation      All of the patient's questions were answered. He states understanding and agreement with my assessment and plan as above.     I spent 20 minutes caring for Sandoval on this date of service. This time includes time spent by me in the following activities: preparing for the visit, reviewing tests, obtaining and/or reviewing a separately obtained history, performing a medically appropriate examination and/or evaluation , counseling and educating the patient/family/caregiver, ordering medications, tests, or procedures, documenting information in the medical record and care coordination; discussing PAP therapy, PAP compliance, PAP maintenance and Study results    Patient to follow up in 31-90 days with compliance report. Patient agrees to return sooner if changes in symptoms.         This document has been electronically signed by NIKIA Li on April 5, 2022 09:45 CDT            CC: Guanako Atkins MD          No ref. provider found

## 2022-07-06 ENCOUNTER — OFFICE VISIT (OUTPATIENT)
Dept: SLEEP MEDICINE | Facility: HOSPITAL | Age: 62
End: 2022-07-06

## 2022-07-06 VITALS
BODY MASS INDEX: 23.47 KG/M2 | HEIGHT: 74 IN | OXYGEN SATURATION: 95 % | HEART RATE: 89 BPM | DIASTOLIC BLOOD PRESSURE: 63 MMHG | WEIGHT: 182.9 LBS | SYSTOLIC BLOOD PRESSURE: 105 MMHG

## 2022-07-06 DIAGNOSIS — G47.33 OBSTRUCTIVE SLEEP APNEA, ADULT: Primary | ICD-10-CM

## 2022-07-06 DIAGNOSIS — F17.210 CIGARETTE NICOTINE DEPENDENCE, UNCOMPLICATED: ICD-10-CM

## 2022-07-06 PROCEDURE — 99213 OFFICE O/P EST LOW 20 MIN: CPT | Performed by: NURSE PRACTITIONER

## 2022-07-06 RX ORDER — APIXABAN 5 MG/1
TABLET, FILM COATED ORAL
Status: ON HOLD | COMMUNITY
Start: 2022-05-27

## 2022-07-06 NOTE — PROGRESS NOTES
Sleep Clinic Follow Up    Date: 2022  Primary Care Provider: Guanako Atkins MD    Last office visit: 2022 (I reviewed this note)    CC: Follow up: ALICE started on CPAP      Interim History:  Since the last visit:    1) moderate ALICE -  Sandoval Navarro has mostly remained compliant with CPAP. He denies mask and machine issues, dry mouth, headaches, or pressures intolerance. He denies abnormal dreams, sleep paralysis, nasal congestion, URI sx. Since starting PAP therapy, patient reports no difference in daytime sleepiness or quality of sleep.    2) Patient denies RLS symptoms.     Sleep Testin. HST on 2022, RDI of 17   2. Currently on 5-15 cm H2O    PAP Data:    Time frame: 2022-2022   Compliance: 76.9%  Average use on days used: 5 hrs 29 min  Percent of days with usage greater than or equal to 4 hours: 52.6%  PAP range: 5-15 cm H2O  Average 90% pressure: 7.1 cmH2O  Leak: 33 minutes  Average AHI: 2.1 events/hr  Mask type: Full face mask  DME: [facility]    Bed time: 5999-4039  Sleep latency: 30-45 minutes  Number of times awakens during the night: 3-4  Wake time: [id number]  Estimated total sleep time at night: 5 hours  Caffeine intake: 0 cups of coffee, 0 cups of tea, and 6 sodas per day  Alcohol intake: 0 drinks per week  Nap time: rare   Sleepiness with Driving: none     [place] - 3 (ESS prior to treatment - 4)  [place] Sleepiness Scale  Sitting and reading: Slight chance of dozing  Watching TV: Slight chance of dozing  Sitting, inactive in a public place (e.g. a theatre or a meeting): Would never doze  As a passenger in a car for an hour without a break: Would never doze  Lying down to rest in the afternoon when circumstances permit: Slight chance of dozing  Sitting and talking to someone: Would never doze  Sitting quietly after a lunch without alcohol: Would never doze  In a car, while stopped for a few minutes in traffic: Would never doze  Total score: 3    PMHx, FH, SH  reviewed and pertinent changes are: Reportedly unchanged from last office visit with us.      REVIEW OF SYSTEMS:   Negative for chest pain, SOA, fever, chills, cough, N/V/D, abdominal pain.    Smokin ppd    Sandoval Navarro  reports that he has been smoking cigarettes. He has been smoking about 1.00 pack per day. He does not have any smokeless tobacco history on file.. I have educated him on the risk of diseases from using tobacco products such as cancer, COPD and heart disease.     I advised him to quit and he is willing to quit. We have discussed the following method/s for tobacco cessation:  Education Material.  No set quit date at this time.    I spent 3.5 minutes counseling the patient.      Exam:  Vitals:    22 1115   BP: 105/63   Pulse: 89   SpO2: 95%           22  1115   Weight: 83 kg (182 lb 14.4 oz)     Body mass index is 23.47 kg/m². BMI is within normal parameters. No other follow-up for BMI required.    Gen:                No distress, conversant, pleasant, appears stated age, alert, oriented  Eyes:               Anicteric sclera, moist conjunctiva, no lid lag                           PERRL, EOMI   Heent:             NC/AT                          Oropharynx clear                          Normal hearing  Lungs:             Normal effort, non-labored breathing   CV:                  Normal S1/S2                          No lower extremity edema  ABD:               Soft, rounded, non-distended                  Psych:             Appropriate affect  Neuro:             CN 2-12 appear intact    Past Medical History:   Diagnosis Date   • Diabetes mellitus (HCC)    • Gout    • Hyperlipemia        Current Outpatient Medications:   •  aspirin 81 MG chewable tablet, Chew 81 mg Daily., Disp: , Rfl:   •  calcium polycarbophil (FiberCon) 625 MG tablet, Take 2 tablets by mouth Daily., Disp: , Rfl:   •  Continuous Blood Gluc Sensor (FreeStyle Fina 2 Sensor) mis, REPLACE SENSOR E 14 DAYS, Disp: ,  Rfl:   •  cyanocobalamin 1000 MCG/ML injection, , Disp: , Rfl:   •  Farxiga 5 MG tablet tablet, Take 10 mg by mouth Daily., Disp: , Rfl:   •  glucose blood test strip, USE TO TEST BLOOD SUGAR 3 TIMES A DAY, Disp: , Rfl:   •  insulin aspart (novoLOG FLEXPEN) 100 UNIT/ML solution pen-injector sc pen, Up 15u sq tid, Disp: , Rfl:   •  Insulin Glargine (BASAGLAR KWIKPEN) 100 UNIT/ML injection pen, Inject 48 Units under the skin into the appropriate area as directed Daily., Disp: , Rfl: 5  •  linaclotide (LINZESS) 145 MCG capsule capsule, Take 145 mcg by mouth., Disp: , Rfl:   •  magnesium oxide (MAG-OX) 400 MG tablet, Take 400 mg by mouth 2 (Two) Times a Day., Disp: , Rfl:   •  meloxicam (MOBIC) 15 MG tablet, Take 15 mg by mouth Daily., Disp: , Rfl:   •  omeprazole (priLOSEC) 40 MG capsule, Take 40 mg by mouth Daily., Disp: , Rfl:   •  Pancrelipase, Lip-Prot-Amyl, (CREON) 45733-381319 units capsule delayed-release particles capsule, Take  by mouth Daily., Disp: , Rfl:   •  simvastatin (ZOCOR) 10 MG tablet, Take 1 tablet by mouth., Disp: , Rfl:   •  tamsulosin (FLOMAX) 0.4 MG capsule 24 hr capsule, Take 1 capsule by mouth Daily., Disp: , Rfl:   •  vitamin D (ERGOCALCIFEROL) 1.25 MG (16262 UT) capsule capsule, Take 50,000 Units by mouth 1 (One) Time Per Week., Disp: , Rfl:   •  Eliquis 5 MG tablet tablet, , Disp: , Rfl:     WBC   Date Value Ref Range Status   09/02/2020 12.2 (H) 4.1 - 10.9 THOUS/uL Final     RBC   Date Value Ref Range Status   09/02/2020 5.10 3.35 - 5.50 MIL/uL Final     Hemoglobin   Date Value Ref Range Status   09/02/2020 14.9 12.9 - 16.6 GM/DL Final   02/28/2019 15 14.4 - 16.6 g/dL Final     Hematocrit   Date Value Ref Range Status   09/02/2020 45.5 38.0 - 48.0 % Final     MCV   Date Value Ref Range Status   09/02/2020 89.2 81.0 - 95.0 FL Final     MCH   Date Value Ref Range Status   09/02/2020 29.2 27.0 - 33.0 PG Final     MCHC   Date Value Ref Range Status   09/02/2020 32.7 (L) 33.0 - 37.0 G/DL Final      RDW   Date Value Ref Range Status   09/02/2020 13.5 11.5 - 14.5 % Final     RDW-SD   Date Value Ref Range Status   09/02/2020 43.8 (H) 35.0 - 42.0 FL Final     MPV   Date Value Ref Range Status   09/02/2020 11.0 (H) 7.4 - 10.4 FL Final     Platelets   Date Value Ref Range Status   09/02/2020 292 130 - 400 THOUS/uL Final     Neutrophil Rel %   Date Value Ref Range Status   09/02/2020 63.2 42.2 - 75.2 % Final     Lymphocyte Rel %   Date Value Ref Range Status   09/02/2020 24.8 20.5 - 51.1 % Final     Monocyte Rel %   Date Value Ref Range Status   09/02/2020 8.3 1.7 - 9.3 % Final     Eosinophil Rel %   Date Value Ref Range Status   02/28/2019 2.4 0 - 5 % Final     Eosinophil %   Date Value Ref Range Status   09/02/2020 2.2 1.0 - 3.0 % Final     Basophil Rel %   Date Value Ref Range Status   09/02/2020 1.0 0.0 - 2.0 % Final     Immature Grans %   Date Value Ref Range Status   09/02/2020 0.5 (H) 0.0 - 0.4 % Final     Comment:     IG PARAMETER REFLECTS THE  COMBINATION OF METAMYELOCYTES,  MYELOCYTES, AND PROMYELOCYTES.     Neutrophils Absolute   Date Value Ref Range Status   09/02/2020 7.7 1.7 - 8.7 THOUS/uL Final     Lymphocytes Absolute   Date Value Ref Range Status   09/02/2020 3.0 0.8 - 5.6 THOUS/uL Final     Monocytes Absolute   Date Value Ref Range Status   09/02/2020 1.0 0.1 - 1.0 THOUS/uL Final     Eosinophils Absolute   Date Value Ref Range Status   09/02/2020 0.3 0.0 - 0.3 THOUS/uL Final     Basophils Absolute   Date Value Ref Range Status   09/02/2020 0.1 0.0 - 0.2 THOUS/uL Final     Immature Grans, Absolute   Date Value Ref Range Status   09/02/2020 0.06 (H) 0.00 - 0.04 THOUS/uL Final     nRBC   Date Value Ref Range Status   09/02/2020 0.0 0 /100 WBC'S Final   08/27/2020 0.0 0.0 - 0.2 /100 WBC Final       Lab Results   Component Value Date    GLUCOSE 61 (L) 08/27/2020    BUN 21 05/31/2022    CREATININE 0.9 05/31/2022    EGFRIFNONA 86 08/27/2020    EGFRIFAFRI 103 09/09/2021    BCR 24.4 08/27/2020    K 4.6  05/31/2022    CO2 31 05/31/2022    CALCIUM 9.7 05/31/2022    ALBUMIN 4.10 08/27/2020    AST 27 08/27/2020    ALT 25 08/27/2020       Assessment and Plan:    1. Obstructive sleep apnea - Established, stable (1)  1. Compliant with PAP therapy  2. Continue PAP as prescribed  3. Script for PAP supplies - recommend switching to a different type of mask, either nasal pillows or different type of FFM  4. Return to clinic in 4 months with compliance report unless change in symptoms in interim period  2. Nicotine dependence without complication - stable chronic illness  1. Smoking cessation information provided. Smoking and tobacco use cessation counseling visit was 3.5 minutes.       I spent 20 minutes caring for Sandoval on this date of service. This time includes time spent by me in the following activities: preparing for the visit, reviewing tests, obtaining and/or reviewing a separately obtained history, performing a medically appropriate examination and/or evaluation , counseling and educating the patient/family/caregiver, documenting information in the medical record and care coordination; discussing PAP therapy, PAP compliance and PAP maintenance    RTC in 4 months. Patient agrees to return sooner if changes in symptoms.        This document has been electronically signed by NIKIA Li on July 6, 2022 11:27 CDT          CC: Guanako Atkins MD          No ref. provider found

## 2023-03-28 ENCOUNTER — LAB REQUISITION (OUTPATIENT)
Dept: LAB | Facility: HOSPITAL | Age: 63
End: 2023-03-28
Payer: MEDICARE

## 2023-03-28 DIAGNOSIS — N39.0 URINARY TRACT INFECTION, SITE NOT SPECIFIED: ICD-10-CM

## 2023-03-28 LAB
BILIRUB UR QL STRIP: NEGATIVE
CLARITY UR: CLEAR
COLOR UR: YELLOW
FLUAV AG NPH QL: NEGATIVE
FLUBV AG NPH QL IA: NEGATIVE
GLUCOSE UR STRIP-MCNC: ABNORMAL MG/DL
HGB UR QL STRIP.AUTO: NEGATIVE
KETONES UR QL STRIP: ABNORMAL
LEUKOCYTE ESTERASE UR QL STRIP.AUTO: NEGATIVE
NITRITE UR QL STRIP: NEGATIVE
PH UR STRIP.AUTO: 7 [PH] (ref 5–9)
PROT UR QL STRIP: NEGATIVE
SP GR UR STRIP: 1.05 (ref 1–1.03)
UROBILINOGEN UR QL STRIP: ABNORMAL

## 2023-03-28 PROCEDURE — 81003 URINALYSIS AUTO W/O SCOPE: CPT | Performed by: FAMILY MEDICINE

## 2023-03-28 PROCEDURE — 87804 INFLUENZA ASSAY W/OPTIC: CPT | Performed by: FAMILY MEDICINE

## 2023-03-28 PROCEDURE — 87086 URINE CULTURE/COLONY COUNT: CPT | Performed by: FAMILY MEDICINE

## 2023-03-29 LAB — BACTERIA SPEC AEROBE CULT: NO GROWTH

## 2023-04-03 ENCOUNTER — APPOINTMENT (OUTPATIENT)
Dept: GENERAL RADIOLOGY | Facility: HOSPITAL | Age: 63
End: 2023-04-03
Payer: MEDICARE

## 2023-04-03 ENCOUNTER — HOSPITAL ENCOUNTER (OUTPATIENT)
Facility: HOSPITAL | Age: 63
Setting detail: OBSERVATION
Discharge: ANOTHER HOSPITAL | End: 2023-04-07
Attending: STUDENT IN AN ORGANIZED HEALTH CARE EDUCATION/TRAINING PROGRAM | Admitting: INTERNAL MEDICINE
Payer: MEDICARE

## 2023-04-03 ENCOUNTER — APPOINTMENT (OUTPATIENT)
Dept: CT IMAGING | Facility: HOSPITAL | Age: 63
End: 2023-04-03
Payer: MEDICARE

## 2023-04-03 DIAGNOSIS — I26.94 MULTIPLE SUBSEGMENTAL PULMONARY EMBOLI WITHOUT ACUTE COR PULMONALE: Primary | ICD-10-CM

## 2023-04-03 DIAGNOSIS — Z74.09 IMPAIRED MOBILITY AND ADLS: ICD-10-CM

## 2023-04-03 DIAGNOSIS — Z74.09 IMPAIRED FUNCTIONAL MOBILITY, BALANCE, GAIT, AND ENDURANCE: ICD-10-CM

## 2023-04-03 DIAGNOSIS — Z78.9 IMPAIRED MOBILITY AND ADLS: ICD-10-CM

## 2023-04-03 PROBLEM — J96.22 ACUTE ON CHRONIC RESPIRATORY FAILURE WITH HYPOXIA AND HYPERCAPNIA: Status: ACTIVE | Noted: 2023-04-03

## 2023-04-03 PROBLEM — J96.21 ACUTE ON CHRONIC RESPIRATORY FAILURE WITH HYPOXIA AND HYPERCAPNIA: Status: ACTIVE | Noted: 2023-04-03

## 2023-04-03 LAB
ALBUMIN SERPL-MCNC: 3.6 G/DL (ref 3.5–5.2)
ALBUMIN/GLOB SERPL: 1.3 G/DL
ALP SERPL-CCNC: 86 U/L (ref 39–117)
ALT SERPL W P-5'-P-CCNC: 24 U/L (ref 1–41)
ANION GAP SERPL CALCULATED.3IONS-SCNC: 8 MMOL/L (ref 5–15)
AST SERPL-CCNC: 19 U/L (ref 1–40)
BASOPHILS # BLD AUTO: 0.08 10*3/MM3 (ref 0–0.2)
BASOPHILS NFR BLD AUTO: 0.6 % (ref 0–1.5)
BILIRUB SERPL-MCNC: 0.2 MG/DL (ref 0–1.2)
BILIRUB UR QL STRIP: NEGATIVE
BUN SERPL-MCNC: 13 MG/DL (ref 8–23)
BUN/CREAT SERPL: 16.7 (ref 7–25)
CALCIUM SPEC-SCNC: 9.1 MG/DL (ref 8.6–10.5)
CHLORIDE SERPL-SCNC: 101 MMOL/L (ref 98–107)
CLARITY UR: CLEAR
CO2 SERPL-SCNC: 28 MMOL/L (ref 22–29)
COLOR UR: YELLOW
CREAT SERPL-MCNC: 0.78 MG/DL (ref 0.76–1.27)
D-DIMER, QUANTITATIVE (MAD,POW, STR): 820 NG/ML (FEU) (ref 0–640)
DEPRECATED RDW RBC AUTO: 42.9 FL (ref 37–54)
EGFRCR SERPLBLD CKD-EPI 2021: 99.6 ML/MIN/1.73
EOSINOPHIL # BLD AUTO: 0.06 10*3/MM3 (ref 0–0.4)
EOSINOPHIL NFR BLD AUTO: 0.4 % (ref 0.3–6.2)
ERYTHROCYTE [DISTWIDTH] IN BLOOD BY AUTOMATED COUNT: 13.3 % (ref 12.3–15.4)
GEN 5 2HR TROPONIN T REFLEX: 11 NG/L
GLOBULIN UR ELPH-MCNC: 2.7 GM/DL
GLUCOSE SERPL-MCNC: 229 MG/DL (ref 65–99)
GLUCOSE UR STRIP-MCNC: ABNORMAL MG/DL
HCT VFR BLD AUTO: 42.6 % (ref 37.5–51)
HGB BLD-MCNC: 13.8 G/DL (ref 13–17.7)
HGB UR QL STRIP.AUTO: NEGATIVE
HOLD SPECIMEN: NORMAL
IMM GRANULOCYTES # BLD AUTO: 0.08 10*3/MM3 (ref 0–0.05)
IMM GRANULOCYTES NFR BLD AUTO: 0.6 % (ref 0–0.5)
KETONES UR QL STRIP: NEGATIVE
LEUKOCYTE ESTERASE UR QL STRIP.AUTO: NEGATIVE
LYMPHOCYTES # BLD AUTO: 2.9 10*3/MM3 (ref 0.7–3.1)
LYMPHOCYTES NFR BLD AUTO: 21.3 % (ref 19.6–45.3)
MAGNESIUM SERPL-MCNC: 2 MG/DL (ref 1.6–2.4)
MCH RBC QN AUTO: 28.5 PG (ref 26.6–33)
MCHC RBC AUTO-ENTMCNC: 32.4 G/DL (ref 31.5–35.7)
MCV RBC AUTO: 87.8 FL (ref 79–97)
MONOCYTES # BLD AUTO: 0.86 10*3/MM3 (ref 0.1–0.9)
MONOCYTES NFR BLD AUTO: 6.3 % (ref 5–12)
NEUTROPHILS NFR BLD AUTO: 70.8 % (ref 42.7–76)
NEUTROPHILS NFR BLD AUTO: 9.62 10*3/MM3 (ref 1.7–7)
NITRITE UR QL STRIP: NEGATIVE
NRBC BLD AUTO-RTO: 0 /100 WBC (ref 0–0.2)
PH UR STRIP.AUTO: 6 [PH] (ref 5–9)
PLATELET # BLD AUTO: 311 10*3/MM3 (ref 140–450)
PMV BLD AUTO: 9.8 FL (ref 6–12)
POTASSIUM SERPL-SCNC: 4.1 MMOL/L (ref 3.5–5.2)
PROT SERPL-MCNC: 6.3 G/DL (ref 6–8.5)
PROT UR QL STRIP: NEGATIVE
RBC # BLD AUTO: 4.85 10*6/MM3 (ref 4.14–5.8)
S PNEUM AG SPEC QL LA: NEGATIVE
SODIUM SERPL-SCNC: 137 MMOL/L (ref 136–145)
SP GR UR STRIP: 1.03 (ref 1–1.03)
TROPONIN T DELTA: 0 NG/L
TROPONIN T SERPL HS-MCNC: 11 NG/L
TSH SERPL DL<=0.05 MIU/L-ACNC: 1.63 UIU/ML (ref 0.27–4.2)
UROBILINOGEN UR QL STRIP: ABNORMAL
WBC NRBC COR # BLD: 13.6 10*3/MM3 (ref 3.4–10.8)

## 2023-04-03 PROCEDURE — 87899 AGENT NOS ASSAY W/OPTIC: CPT | Performed by: HOSPITALIST

## 2023-04-03 PROCEDURE — G0378 HOSPITAL OBSERVATION PER HR: HCPCS

## 2023-04-03 PROCEDURE — 96361 HYDRATE IV INFUSION ADD-ON: CPT

## 2023-04-03 PROCEDURE — 36415 COLL VENOUS BLD VENIPUNCTURE: CPT

## 2023-04-03 PROCEDURE — 87040 BLOOD CULTURE FOR BACTERIA: CPT | Performed by: HOSPITALIST

## 2023-04-03 PROCEDURE — 84443 ASSAY THYROID STIM HORMONE: CPT | Performed by: STUDENT IN AN ORGANIZED HEALTH CARE EDUCATION/TRAINING PROGRAM

## 2023-04-03 PROCEDURE — 85025 COMPLETE CBC W/AUTO DIFF WBC: CPT | Performed by: STUDENT IN AN ORGANIZED HEALTH CARE EDUCATION/TRAINING PROGRAM

## 2023-04-03 PROCEDURE — 93005 ELECTROCARDIOGRAM TRACING: CPT | Performed by: STUDENT IN AN ORGANIZED HEALTH CARE EDUCATION/TRAINING PROGRAM

## 2023-04-03 PROCEDURE — 25010000002 CEFTRIAXONE PER 250 MG: Performed by: HOSPITALIST

## 2023-04-03 PROCEDURE — 36415 COLL VENOUS BLD VENIPUNCTURE: CPT | Performed by: HOSPITALIST

## 2023-04-03 PROCEDURE — 25510000001 IOPAMIDOL PER 1 ML: Performed by: STUDENT IN AN ORGANIZED HEALTH CARE EDUCATION/TRAINING PROGRAM

## 2023-04-03 PROCEDURE — 63710000001 INSULIN DETEMIR PER 5 UNITS: Performed by: HOSPITALIST

## 2023-04-03 PROCEDURE — 71045 X-RAY EXAM CHEST 1 VIEW: CPT

## 2023-04-03 PROCEDURE — 83735 ASSAY OF MAGNESIUM: CPT | Performed by: STUDENT IN AN ORGANIZED HEALTH CARE EDUCATION/TRAINING PROGRAM

## 2023-04-03 PROCEDURE — 93010 ELECTROCARDIOGRAM REPORT: CPT | Performed by: INTERNAL MEDICINE

## 2023-04-03 PROCEDURE — 94640 AIRWAY INHALATION TREATMENT: CPT

## 2023-04-03 PROCEDURE — 84484 ASSAY OF TROPONIN QUANT: CPT | Performed by: STUDENT IN AN ORGANIZED HEALTH CARE EDUCATION/TRAINING PROGRAM

## 2023-04-03 PROCEDURE — 85379 FIBRIN DEGRADATION QUANT: CPT | Performed by: STUDENT IN AN ORGANIZED HEALTH CARE EDUCATION/TRAINING PROGRAM

## 2023-04-03 PROCEDURE — 81003 URINALYSIS AUTO W/O SCOPE: CPT | Performed by: STUDENT IN AN ORGANIZED HEALTH CARE EDUCATION/TRAINING PROGRAM

## 2023-04-03 PROCEDURE — 99285 EMERGENCY DEPT VISIT HI MDM: CPT

## 2023-04-03 PROCEDURE — 25010000002 AZITHROMYCIN PER 500 MG: Performed by: HOSPITALIST

## 2023-04-03 PROCEDURE — 80053 COMPREHEN METABOLIC PANEL: CPT | Performed by: STUDENT IN AN ORGANIZED HEALTH CARE EDUCATION/TRAINING PROGRAM

## 2023-04-03 PROCEDURE — 71275 CT ANGIOGRAPHY CHEST: CPT

## 2023-04-03 PROCEDURE — 93005 ELECTROCARDIOGRAM TRACING: CPT

## 2023-04-03 RX ORDER — PANTOPRAZOLE SODIUM 40 MG/1
40 TABLET, DELAYED RELEASE ORAL
Status: DISCONTINUED | OUTPATIENT
Start: 2023-04-04 | End: 2023-04-06 | Stop reason: SDUPTHER

## 2023-04-03 RX ORDER — SODIUM CHLORIDE 9 MG/ML
125 INJECTION, SOLUTION INTRAVENOUS CONTINUOUS
Status: DISCONTINUED | OUTPATIENT
Start: 2023-04-03 | End: 2023-04-03

## 2023-04-03 RX ORDER — SODIUM CHLORIDE 0.9 % (FLUSH) 0.9 %
10 SYRINGE (ML) INJECTION AS NEEDED
Status: DISCONTINUED | OUTPATIENT
Start: 2023-04-03 | End: 2023-04-07 | Stop reason: HOSPADM

## 2023-04-03 RX ORDER — CALCIUM POLYCARBOPHIL 625 MG
1250 TABLET ORAL DAILY
Status: DISCONTINUED | OUTPATIENT
Start: 2023-04-03 | End: 2023-04-07 | Stop reason: HOSPADM

## 2023-04-03 RX ORDER — NICOTINE POLACRILEX 4 MG
15 LOZENGE BUCCAL
Status: DISCONTINUED | OUTPATIENT
Start: 2023-04-03 | End: 2023-04-07 | Stop reason: HOSPADM

## 2023-04-03 RX ORDER — TAMSULOSIN HYDROCHLORIDE 0.4 MG/1
0.4 CAPSULE ORAL DAILY
Status: DISCONTINUED | OUTPATIENT
Start: 2023-04-03 | End: 2023-04-07 | Stop reason: HOSPADM

## 2023-04-03 RX ORDER — INSULIN ASPART 100 [IU]/ML
15 INJECTION, SOLUTION INTRAVENOUS; SUBCUTANEOUS
Status: DISCONTINUED | OUTPATIENT
Start: 2023-04-03 | End: 2023-04-07 | Stop reason: HOSPADM

## 2023-04-03 RX ORDER — INSULIN ASPART 100 [IU]/ML
0-14 INJECTION, SOLUTION INTRAVENOUS; SUBCUTANEOUS
Status: DISCONTINUED | OUTPATIENT
Start: 2023-04-03 | End: 2023-04-07 | Stop reason: HOSPADM

## 2023-04-03 RX ORDER — SODIUM CHLORIDE 0.9 % (FLUSH) 0.9 %
10 SYRINGE (ML) INJECTION EVERY 12 HOURS SCHEDULED
Status: DISCONTINUED | OUTPATIENT
Start: 2023-04-03 | End: 2023-04-07 | Stop reason: HOSPADM

## 2023-04-03 RX ORDER — DEXTROSE MONOHYDRATE 25 G/50ML
25 INJECTION, SOLUTION INTRAVENOUS
Status: DISCONTINUED | OUTPATIENT
Start: 2023-04-03 | End: 2023-04-07 | Stop reason: HOSPADM

## 2023-04-03 RX ORDER — IPRATROPIUM BROMIDE AND ALBUTEROL SULFATE 2.5; .5 MG/3ML; MG/3ML
3 SOLUTION RESPIRATORY (INHALATION)
Status: DISCONTINUED | OUTPATIENT
Start: 2023-04-03 | End: 2023-04-07 | Stop reason: HOSPADM

## 2023-04-03 RX ORDER — SODIUM CHLORIDE 9 MG/ML
40 INJECTION, SOLUTION INTRAVENOUS AS NEEDED
Status: DISCONTINUED | OUTPATIENT
Start: 2023-04-03 | End: 2023-04-07 | Stop reason: HOSPADM

## 2023-04-03 RX ORDER — GLUCAGON 1 MG/ML
1 KIT INJECTION
Status: DISCONTINUED | OUTPATIENT
Start: 2023-04-03 | End: 2023-04-07 | Stop reason: HOSPADM

## 2023-04-03 RX ORDER — ASPIRIN 81 MG/1
81 TABLET, CHEWABLE ORAL DAILY
Status: DISCONTINUED | OUTPATIENT
Start: 2023-04-03 | End: 2023-04-07 | Stop reason: HOSPADM

## 2023-04-03 RX ORDER — ATORVASTATIN CALCIUM 10 MG/1
10 TABLET, FILM COATED ORAL DAILY
Status: DISCONTINUED | OUTPATIENT
Start: 2023-04-03 | End: 2023-04-07 | Stop reason: HOSPADM

## 2023-04-03 RX ADMIN — IOPAMIDOL 67 ML: 755 INJECTION, SOLUTION INTRAVENOUS at 15:48

## 2023-04-03 RX ADMIN — SODIUM CHLORIDE 125 ML/HR: 9 INJECTION, SOLUTION INTRAVENOUS at 15:58

## 2023-04-03 RX ADMIN — INSULIN DETEMIR 41 UNITS: 100 INJECTION, SOLUTION SUBCUTANEOUS at 20:36

## 2023-04-03 RX ADMIN — PANCRELIPASE 12000 UNITS OF LIPASE: 60000; 12000; 38000 CAPSULE, DELAYED RELEASE PELLETS ORAL at 19:39

## 2023-04-03 RX ADMIN — AZITHROMYCIN DIHYDRATE 500 MG: 500 INJECTION, POWDER, LYOPHILIZED, FOR SOLUTION INTRAVENOUS at 21:34

## 2023-04-03 RX ADMIN — Medication 10 ML: at 20:24

## 2023-04-03 RX ADMIN — APIXABAN 5 MG: 5 TABLET, FILM COATED ORAL at 20:24

## 2023-04-03 RX ADMIN — ATORVASTATIN CALCIUM 10 MG: 10 TABLET, FILM COATED ORAL at 19:39

## 2023-04-03 RX ADMIN — IPRATROPIUM BROMIDE AND ALBUTEROL SULFATE 3 ML: 2.5; .5 SOLUTION RESPIRATORY (INHALATION) at 20:27

## 2023-04-03 RX ADMIN — CALCIUM POLYCARBOPHIL 1250 MG: 625 TABLET, FILM COATED ORAL at 19:40

## 2023-04-03 RX ADMIN — SODIUM CHLORIDE 500 ML: 9 INJECTION, SOLUTION INTRAVENOUS at 15:58

## 2023-04-03 RX ADMIN — CEFTRIAXONE SODIUM 1 G: 1 INJECTION, POWDER, FOR SOLUTION INTRAMUSCULAR; INTRAVENOUS at 21:33

## 2023-04-03 RX ADMIN — TAMSULOSIN HYDROCHLORIDE 0.4 MG: 0.4 CAPSULE ORAL at 19:39

## 2023-04-03 RX ADMIN — ASPIRIN 81 MG: 81 TABLET, CHEWABLE ORAL at 19:39

## 2023-04-03 NOTE — ED PROVIDER NOTES
Subjective   History of Present Illness  Patient presents with elevated heart rate, chest tightness, shortness of breath and the feeling that he is having a hard time breathing.  Patient has been at the skilled nursing facility since having a stroke in February 2021.  His blood sugars have been between 60 and 300 per family.  He is not on any supplemental oxygen but he does use CPAP which she has not used since he has been there because is not available to him.  Patient denies fever chills, nausea vomiting, or any other pain        Review of Systems   Constitutional: Negative for activity change and appetite change.   HENT: Negative for congestion and ear pain.    Eyes: Negative for pain and discharge.   Respiratory: Positive for chest tightness and shortness of breath.    Cardiovascular: Negative for chest pain and palpitations.   Gastrointestinal: Negative for abdominal distention and abdominal pain.   Endocrine: Negative for cold intolerance and heat intolerance.   Genitourinary: Negative for difficulty urinating and dysuria.   Musculoskeletal: Negative for arthralgias and back pain.   Skin: Negative for color change and rash.   Allergic/Immunologic: Negative for environmental allergies and food allergies.   Neurological: Positive for weakness (left-sided after cva). Negative for dizziness and headaches.   Hematological: Negative for adenopathy. Does not bruise/bleed easily.   Psychiatric/Behavioral: Negative for agitation and confusion.       Past Medical History:   Diagnosis Date   • Diabetes mellitus    • Gout    • Hyperlipemia        No Known Allergies    Past Surgical History:   Procedure Laterality Date   • BACK SURGERY     • CHOLECYSTECTOMY     • COLONOSCOPY N/A 3/14/2017    Procedure: COLONOSCOPY;  Surgeon: Cachorro Perez DO;  Location: [facility] ENDOSCOPY;  Service:    • HAND SURGERY     • LEG SURGERY Right    • NOSE SURGERY     • PANCREAS SURGERY     • SPLENECTOMY         History reviewed. No pertinent  family history.    Social History     Socioeconomic History   • Marital status: Single   Tobacco Use   • Smoking status: Every Day     Packs/day: 1.00     Types: Cigarettes   Vaping Use   • Vaping Use: Never used   Substance and Sexual Activity   • Alcohol use: Not Currently   • Drug use: No   • Sexual activity: Defer           Objective   Physical Exam  Vitals and nursing note reviewed.   Constitutional:       Appearance: He is well-developed.   HENT:      Head: Normocephalic and atraumatic.   Eyes:      Pupils: Pupils are equal, round, and reactive to light.   Neck:      Thyroid: No thyromegaly.      Trachea: No tracheal deviation.   Cardiovascular:      Rate and Rhythm: Regular rhythm. Tachycardia present.      Pulses:           Radial pulses are 2+ on the left side.        Dorsalis pedis pulses are 2+ on the right side and 2+ on the left side.      Heart sounds: Normal heart sounds, S1 normal and S2 normal.   Pulmonary:      Effort: Pulmonary effort is normal.      Breath sounds: Normal breath sounds.   Abdominal:      Palpations: Abdomen is soft.   Musculoskeletal:      Cervical back: Neck supple.      Right lower leg: No tenderness. No edema.      Left lower leg: No tenderness. No edema.      Comments: Left upper arm flaccid, left lower leg move side to side on bed and left knee off bed.   Skin:     General: Skin is warm and dry.      Capillary Refill: Capillary refill takes 2 to 3 seconds.   Neurological:      Mental Status: He is alert and oriented to person, place, and time.      GCS: GCS eye subscore is 4. GCS verbal subscore is 5. GCS motor subscore is 6.   Psychiatric:         Speech: Speech normal.         Behavior: Behavior normal.         Thought Content: Thought content normal.         ECG 12 Lead      Date/Time: 4/3/2023 4:59 PM  Performed by: Horacio Mitchell MD  Authorized by: Horacio Mitchell MD   Interpreted by physician  Comparison: compared with previous ECG from 8/27/2020  Rhythm:  "sinus tachycardia  Comments: Sinus tachycardia ventricular rate 133 bpm, QRS 96 ms, QTc 467 ms.                 ED Course      Vitals:    04/03/23 1817 04/03/23 1844 04/03/23 2000 04/03/23 2027   BP: 126/86  142/81    BP Location: Right arm  Right arm    Patient Position: Lying  Lying    Pulse: 76 95 88 85   Resp: 18  20 20   Temp: 98 °F (36.7 °C)  98.3 °F (36.8 °C)    TempSrc: Oral  Temporal    SpO2: 97%  97% 98%   Weight: 80.5 kg (177 lb 6.4 oz)      Height: 188 cm (74\")         Lab Results (last 24 hours)     Procedure Component Value Units Date/Time    Blood Culture - Blood, Arm, Left [[id number]] Collected: 04/03/23 1827    Specimen: Blood from Arm, Left Updated: 04/03/23 1835    Blood Culture - Blood, Hand, Right [[id number]] Collected: 04/03/23 1827    Specimen: Blood from Hand, Right Updated: 04/03/23 1835    S. Pneumo Ag Urine or CSF - Urine, Urine, Clean Catch [[id number]]  (Normal) Collected: 04/03/23 1330    Specimen: Urine, Clean Catch Updated: 04/03/23 1817     Strep Pneumo Ag Negative    High Sensitivity Troponin T 2Hr [[id number]]  (Normal) Collected: 04/03/23 1559    Specimen: Blood Updated: 04/03/23 1621     HS Troponin T 11 ng/L      Troponin T Delta 0 ng/L     Narrative:      High Sensitive Troponin T Reference Range:  <10.0 ng/L- Negative Female for AMI  <15.0 ng/L- Negative Male for AMI  >=10 - Abnormal Female indicating possible myocardial injury.  >=15 - Abnormal Male indicating possible myocardial injury.   Clinicians would have to utilize clinical acumen, EKG, Troponin, and serial changes to determine if it is an Acute Myocardial Infarction or myocardial injury due to an underlying chronic condition.         Extra Tubes [[id number]] Collected: 04/03/23 1334    Specimen: Blood, Venous Line Updated: 04/03/23 1445    Narrative:      The following orders were created for panel order Extra Tubes.  Procedure                               Abnormality         Status                     ---------         "                       -----------         ------                     Gold Top - SST[[id number]]                                   Final result                 Please view results for these tests on the individual orders.    Gold Top - SST [[id number]] Collected: 04/03/23 1334    Specimen: Blood Updated: 04/03/23 1445     Extra Tube Hold for add-ons.     Comment: Auto resulted.       Urinalysis With Microscopic If Indicated (No Culture) - Urine, Clean Catch [[id number]]  (Abnormal) Collected: 04/03/23 1330    Specimen: Urine, Clean Catch Updated: 04/03/23 1408     Color, UA Yellow     Appearance, UA Clear     pH, UA 6.0     Specific Gravity, UA 1.031     Comment: Result obtained by Refractometer        Glucose, UA >=1000 mg/dL (3+)     Ketones, UA Negative     Bilirubin, UA Negative     Blood, UA Negative     Protein, UA Negative     Leuk Esterase, UA Negative     Nitrite, UA Negative     Urobilinogen, UA 0.2 E.U./dL    Narrative:      Urine microscopic not indicated.    High Sensitivity Troponin T [[id number]]  (Normal) Collected: 04/03/23 1329    Specimen: Blood Updated: 04/03/23 1403     HS Troponin T 11 ng/L     Narrative:      High Sensitive Troponin T Reference Range:  <10.0 ng/L- Negative Female for AMI  <15.0 ng/L- Negative Male for AMI  >=10 - Abnormal Female indicating possible myocardial injury.  >=15 - Abnormal Male indicating possible myocardial injury.   Clinicians would have to utilize clinical acumen, EKG, Troponin, and serial changes to determine if it is an Acute Myocardial Infarction or myocardial injury due to an underlying chronic condition.         TSH [[id number]]  (Normal) Collected: 04/03/23 1329    Specimen: Blood Updated: 04/03/23 1403     TSH 1.630 uIU/mL     D-dimer, Quantitative [[id number]]  (Abnormal) Collected: 04/03/23 1329    Specimen: Blood Updated: 04/03/23 1401     D-Dimer, Quantitative 820 ng/mL (FEU)     Narrative:      According to the assay 's published package insert,  "a normal (<500 ng/mL (FEU)) D-dimer result in conjunction with a non-high clinical probability assessment, excludes deep vein thrombosis (DVT) and pulmonary embolism (PE) with high sensitivity.    D-dimer values increase with age and this can make VTE exclusion of an older population difficult. To address this, the [employer] College [employer], based on best available evidence and recent guidelines, recommends that clinicians use age-adjusted D-dimer thresholds in patients greater than 50 years of age with: a) a low probability of PE who do not meet all Pulmonary Embolism Rule Out Criteria, or b) in those with intermediate probability of PE.   The formula for an age-adjusted D-dimer cut-off is \"age*10\".  For example, a 60 year old patient would have an age-adjusted cut-off of 600 ng/mL (FEU) and an 80 year old 800 ng/mL (FEU).      Comprehensive Metabolic Panel [[id number]]  (Abnormal) Collected: 04/03/23 1329    Specimen: Blood Updated: 04/03/23 1357     Glucose 229 mg/dL      BUN 13 mg/dL      Creatinine 0.78 mg/dL      Sodium 137 mmol/L      Potassium 4.1 mmol/L      Chloride 101 mmol/L      CO2 28.0 mmol/L      Calcium 9.1 mg/dL      Total Protein 6.3 g/dL      Albumin 3.6 g/dL      ALT (SGPT) 24 U/L      AST (SGOT) 19 U/L      Alkaline Phosphatase 86 U/L      Total Bilirubin 0.2 mg/dL      Globulin 2.7 gm/dL      A/G Ratio 1.3 g/dL      BUN/Creatinine Ratio 16.7     Anion Gap 8.0 mmol/L      eGFR 99.6 mL/min/1.73     Narrative:      GFR Normal >60  Chronic Kidney Disease <60  Kidney Failure <15      Magnesium [[id number]]  (Normal) Collected: 04/03/23 1329    Specimen: Blood Updated: 04/03/23 1357     Magnesium 2.0 mg/dL     CBC & Differential [[id number]]  (Abnormal) Collected: 04/03/23 1329    Specimen: Blood Updated: 04/03/23 1337    Narrative:      The following orders were created for panel order CBC & Differential.  Procedure                               Abnormality         Status                   "   ---------                               -----------         ------                     CBC Auto Differential[[id number]]        Abnormal            Final result                 Please view results for these tests on the individual orders.    CBC Auto Differential [[id number]]  (Abnormal) Collected: 04/03/23 1329    Specimen: Blood Updated: 04/03/23 1337     WBC 13.60 10*3/mm3      RBC 4.85 10*6/mm3      Hemoglobin 13.8 g/dL      Hematocrit 42.6 %      MCV 87.8 fL      MCH 28.5 pg      MCHC 32.4 g/dL      RDW 13.3 %      RDW-SD 42.9 fl      MPV 9.8 fL      Platelets 311 10*3/mm3      Neutrophil % 70.8 %      Lymphocyte % 21.3 %      Monocyte % 6.3 %      Eosinophil % 0.4 %      Basophil % 0.6 %      Immature Grans % 0.6 %      Neutrophils, Absolute 9.62 10*3/mm3      Lymphocytes, Absolute 2.90 10*3/mm3      Monocytes, Absolute 0.86 10*3/mm3      Eosinophils, Absolute 0.06 10*3/mm3      Basophils, Absolute 0.08 10*3/mm3      Immature Grans, Absolute 0.08 10*3/mm3      nRBC 0.0 /100 WBC          CT Angiogram Chest    Result Date: 4/3/2023  1.  Exam positive for pulmonary embolism in right middle and lower lobe pulmonary artery branches extending to the segmental and subsegmental branches primarily of the right lower lobe.  Embolic burden is mild.  Given the peripheral location of the nonocclusive thrombus it is most likely chronic.  No definite acute pulmonary embolism is seen. 2.  Mild bibasilar lung changes may be secondary to atelectasis or pneumonia.                                          Medical Decision Making  Patient with tachycardia and shortness of breath.  CT angiogram positive for bilateral PEs.  Discussed case with hospitalist who accepts patient for admission.    Multiple subsegmental pulmonary emboli without acute cor pulmonale: acute illness or injury  Amount and/or Complexity of Data Reviewed  Labs: ordered.  Radiology: ordered.  ECG/medicine tests: ordered and independent interpretation  performed.      Risk  Prescription drug management.  Decision regarding hospitalization.          Final diagnoses:   Multiple subsegmental pulmonary emboli without acute cor pulmonale       ED Disposition  ED Disposition     ED Disposition   Decision to Admit    Condition   --    Comment   Level of Care: Telemetry [5]   Diagnosis: Multiple subsegmental pulmonary emboli without acute cor pulmonale [[id number]]   Admitting Physician: BRANDON BETANCOURT [[id number]]   Attending Physician: BRANDON BETANCOURT [[id number]]               No follow-up provider specified.       Medication List      No changes were made to your prescriptions during this visit.       This document has been electronically signed by Horacio Mitchell MD on April 4, 2023 00:16 CDT    Horacio Mitchell MD   Part of this note may be an electronic transcription/translation of spoken language to printed text using the Dragon Dictation System.        Horacio Mitchell MD  04/04/23 0016

## 2023-04-03 NOTE — ED NOTES
Nursing report ED to floor  Sandoval Navarro  62 y.o.  male    HPI:   Chief Complaint   Patient presents with    Chest Pain    Rapid Heart Rate       Admitting doctor:   oRsalina Bhatt MD    Consulting provider(s):  Consults       No orders found from 3/5/2023 to 4/4/2023.             Admitting diagnosis:   The encounter diagnosis was Multiple subsegmental pulmonary emboli without acute cor pulmonale.    Code status:   Current Code Status       Date Active Code Status Order ID Comments User Context       4/3/2023 1708 CPR (Attempt to Resuscitate) [id number]  Rosalina Bhatt MD ED        Question Answer    Code Status (Patient has no pulse and is not breathing) CPR (Attempt to Resuscitate)    Medical Interventions (Patient has pulse or is breathing) Full Support                    Allergies:   Patient has no known allergies.    Intake and Output  No intake or output data in the 24 hours ending 04/03/23 1735    Weight:       04/03/23  1227   Weight: 82.6 kg (182 lb)       Most recent vitals:   Vitals:    04/03/23 1227 04/03/23 1357 04/03/23 1445 04/03/23 1554   BP:  116/71 113/76 129/71   BP Location:  Left arm     Patient Position:  Lying     Pulse:  108 104    Resp:  18     Temp:       TempSrc:       SpO2:       Weight: 82.6 kg (182 lb)      Height:         Oxygen Therapy: RA    Active LDAs/IV Access:   Lines, Drains & Airways       Active LDAs       Name Placement date Placement time Site Days    Peripheral IV 04/03/23 Right Antecubital 04/03/23  --  Antecubital  less than 1                    Labs (abnormal labs have a star):   Labs Reviewed   COMPREHENSIVE METABOLIC PANEL - Abnormal; Notable for the following components:       Result Value    Glucose 229 (*)     All other components within normal limits    Narrative:     GFR Normal >60  Chronic Kidney Disease <60  Kidney Failure <15     D-DIMER, QUANTITATIVE - Abnormal; Notable for the following components:    D-Dimer, Quantitative 820 (*)     All other components  "within normal limits    Narrative:     According to the assay 's published package insert, a normal (<500 ng/mL (FEU)) D-dimer result in conjunction with a non-high clinical probability assessment, excludes deep vein thrombosis (DVT) and pulmonary embolism (PE) with high sensitivity.    D-dimer values increase with age and this can make VTE exclusion of an older population difficult. To address this, the [employer], based on best available evidence and recent guidelines, recommends that clinicians use age-adjusted D-dimer thresholds in patients greater than 50 years of age with: a) a low probability of PE who do not meet all Pulmonary Embolism Rule Out Criteria, or b) in those with intermediate probability of PE.   The formula for an age-adjusted D-dimer cut-off is \"age*10\".  For example, a 60 year old patient would have an age-adjusted cut-off of 600 ng/mL (FEU) and an 80 year old 800 ng/mL (FEU).     URINALYSIS W/ MICROSCOPIC IF INDICATED (NO CULTURE) - Abnormal; Notable for the following components:    Specific Gravity, UA 1.031 (*)     Glucose, UA >=1000 mg/dL (3+) (*)     All other components within normal limits    Narrative:     Urine microscopic not indicated.   CBC WITH AUTO DIFFERENTIAL - Abnormal; Notable for the following components:    WBC 13.60 (*)     Immature Grans % 0.6 (*)     Neutrophils, Absolute 9.62 (*)     Immature Grans, Absolute 0.08 (*)     All other components within normal limits   TROPONIN - Normal    Narrative:     High Sensitive Troponin T Reference Range:  <10.0 ng/L- Negative Female for AMI  <15.0 ng/L- Negative Male for AMI  >=10 - Abnormal Female indicating possible myocardial injury.  >=15 - Abnormal Male indicating possible myocardial injury.   Clinicians would have to utilize clinical acumen, EKG, Troponin, and serial changes to determine if it is an Acute Myocardial Infarction or myocardial injury due to an underlying chronic condition.      "   TSH - Normal   MAGNESIUM - Normal   HIGH SENSITIVITIY TROPONIN T 2HR - Normal    Narrative:     High Sensitive Troponin T Reference Range:  <10.0 ng/L- Negative Female for AMI  <15.0 ng/L- Negative Male for AMI  >=10 - Abnormal Female indicating possible myocardial injury.  >=15 - Abnormal Male indicating possible myocardial injury.   Clinicians would have to utilize clinical acumen, EKG, Troponin, and serial changes to determine if it is an Acute Myocardial Infarction or myocardial injury due to an underlying chronic condition.        BLOOD CULTURE   BLOOD CULTURE   RESPIRATORY CULTURE   STREP PNEUMO AG, URINE OR CSF   POCT GLUCOSE FINGERSTICK   POCT GLUCOSE FINGERSTICK   POCT GLUCOSE FINGERSTICK   CBC AND DIFFERENTIAL    Narrative:     The following orders were created for panel order CBC & Differential.  Procedure                               Abnormality         Status                     ---------                               -----------         ------                     CBC Auto Differential[[id number]]        Abnormal            Final result                 Please view results for these tests on the individual orders.   EXTRA TUBES    Narrative:     The following orders were created for panel order Extra Tubes.  Procedure                               Abnormality         Status                     ---------                               -----------         ------                     Gold Top - [facility][[id number]]                                   Final result                 Please view results for these tests on the individual orders.   GOLD TOP - [facility]       Meds given in ED:   Medications   sodium chloride 0.9 % flush 10 mL (has no administration in time range)   cefTRIAXone (ROCEPHIN) 1 g/100 mL 0.9% NS (MBP) (has no administration in time range)   AZITHROMYCIN 500 MG/250 ML 0.9% NS IVPB (vial-mate) (has no administration in time range)   dextrose (GLUTOSE) oral gel 15 g (has no administration in time range)    dextrose (D50W) (25 g/50 mL) IV injection 25 g (has no administration in time range)   glucagon HCl (Diagnostic) injection 1 mg (has no administration in time range)   Insulin Aspart (novoLOG) injection 15 Units (has no administration in time range)   Insulin Aspart (novoLOG) injection 0-14 Units (has no administration in time range)   sodium chloride 0.9 % bolus 500 mL (500 mL Intravenous New Bag 4/3/23 8695)   iopamidol (ISOVUE-370) 76 % injection 100 mL (67 mL Intravenous Given 4/3/23 1544)           NIH Stroke Scale:       Isolation/Infection(s):  No active isolations   No active infections     COVID Testing  Collected NA  Resulted NA    Nursing report ED to floor:  Mental status: A&O x4  Ambulatory status: Assist x1  Precautions: Fall risk    ED nurse phone [phone]

## 2023-04-03 NOTE — H&P
[facility]  HISTORY AND PHYSICAL    Date of Admission: 4/3/2023  Primary Care Physician: Guanako Atkins MD    Subjective     Chief Complaint: dyspnea, palpitations    History of Present Illness  He was brought to the ED because of palpitations along with mild dyspnea, chest tightness, tachycardia which was worse today. He had been coughing for the preceding several days, intermittent chest tightness but couldn't describe it more (deferred questions to his significant other but she was not in the room, the young teenager with him stayed on his phone). No fever or chills, no sputum but occasional rib pain. No nausea or vomiting, no dizziness or diaphoresis. No cramping. He had been staying in a SNF since his stroke. He was on DOAC but at some point was taken off of them and doesn't remember when.     Review of Systems   Constitutional: Positive for fatigue.   HENT: Negative.    Eyes: Negative.    Respiratory: Positive for cough, chest tightness and shortness of breath.    Cardiovascular: Positive for chest pain and palpitations.   Gastrointestinal: Negative.    Endocrine: Negative.    Genitourinary: Negative.    Musculoskeletal: Negative.    Skin: Negative.    Allergic/Immunologic: Negative.    Neurological: Negative.    Hematological: Negative.    Psychiatric/Behavioral: Negative.    Otherwise complete ROS reviewed and negative except as mentioned in the HPI.    Past Medical History:   Past Medical History:   Diagnosis Date   • Diabetes mellitus    • Gout    • Hyperlipemia      Past Surgical History:  Past Surgical History:   Procedure Laterality Date   • BACK SURGERY     • CHOLECYSTECTOMY     • COLONOSCOPY N/A 3/14/2017    Procedure: COLONOSCOPY;  Surgeon: Cachorro Perez DO;  Location: [facility];  Service:    • HAND SURGERY     • LEG SURGERY Right    • NOSE SURGERY     • PANCREAS SURGERY     • SPLENECTOMY       Social History:  reports that he  has been smoking cigarettes. He has been smoking an average of 1 pack per day. He does not have any smokeless tobacco history on file. He reports current alcohol use. He reports that he does not use drugs.    Family History: family healthy    Allergies:  No Known Allergies    Medications:  Prior to Admission medications    Medication Sig Start Date End Date Taking? Authorizing Provider   aspirin 81 MG chewable tablet Chew 81 mg Daily.    Nicola Vega MD   calcium polycarbophil (FiberCon) 625 MG tablet Take 2 tablets by mouth Daily. 11/11/21   Nicola Vega MD   Continuous Blood Gluc Sensor (FreeStyle Fina 2 Sensor) misc REPLACE SENSOR E 14 DAYS 2/26/22   Nicola Vega MD   cyanocobalamin 1000 MCG/ML injection  1/31/22   Nicola Vega MD   Eliquis 5 MG tablet tablet  5/27/22   Nicola Vega MD   Farxiga 5 MG tablet tablet Take 10 mg by mouth Daily. 2/7/22   Nicola Vega MD   glucose blood test strip USE TO TEST BLOOD SUGAR 3 TIMES A DAY 6/14/17   Nicola Vgea MD   insulin aspart (novoLOG FLEXPEN) 100 UNIT/ML solution pen-injector sc pen Up 15u sq tid 2/23/17   Nicola Vega MD   Insulin Glargine (BASAGLAR KWIKPEN) 100 UNIT/ML injection pen Inject 48 Units under the skin into the appropriate area as directed Daily. 6/14/17   Nicola Vega MD   linaclotide (LINZESS) 145 MCG capsule capsule Take 145 mcg by mouth. 1/25/19   Nicola Vega MD   magnesium oxide (MAG-OX) 400 MG tablet Take 400 mg by mouth 2 (Two) Times a Day.    Nicola Vega MD   meloxicam (MOBIC) 15 MG tablet Take 15 mg by mouth Daily.    Nicola Vega MD   omeprazole (priLOSEC) 40 MG capsule Take 40 mg by mouth Daily. 2/5/22   Nicola Vega MD   Pancrelipase, Lip-Prot-Amyl, (CREON) 47824-589148 units capsule delayed-release particles capsule Take  by mouth Daily.    Nicola Vega MD   simvastatin (ZOCOR) 10 MG tablet Take 1 tablet by mouth.   "  Nicola Vega MD   tamsulosin (FLOMAX) 0.4 MG capsule 24 hr capsule Take 1 capsule by mouth Daily.    Nicola Vega MD   vitamin D (ERGOCALCIFEROL) 1.25 MG (06452 UT) capsule capsule Take 50,000 Units by mouth 1 (One) Time Per Week. 1/31/22   Nicola Vega MD I have utilized all available immediate resources to obtain, update, and review the patient's current medications.    Objective     Vital Signs: /71   Pulse 104   Temp 98.8 °F (37.1 °C) (Oral)   Resp 18   Ht 188 cm (74.02\")   Wt 82.6 kg (182 lb)   SpO2 95%   BMI 23.36 kg/m²   Physical Exam   Reclined in stretcher  PERRL, EOMI  Mucosae moist  Neck supple  Breathing stable but limited   Mild tachycardia. Normotensive  Moving all limbs  Skin intact  No new focality    Results Reviewed:  Lab Results (last 24 hours)     Procedure Component Value Units Date/Time    High Sensitivity Troponin T 2Hr [[id number]]  (Normal) Collected: 04/03/23 1559    Specimen: Blood Updated: 04/03/23 1621     HS Troponin T 11 ng/L      Troponin T Delta 0 ng/L     Narrative:      High Sensitive Troponin T Reference Range:  <10.0 ng/L- Negative Female for AMI  <15.0 ng/L- Negative Male for AMI  >=10 - Abnormal Female indicating possible myocardial injury.  >=15 - Abnormal Male indicating possible myocardial injury.   Clinicians would have to utilize clinical acumen, EKG, Troponin, and serial changes to determine if it is an Acute Myocardial Infarction or myocardial injury due to an underlying chronic condition.         Extra Tubes [[id number]] Collected: 04/03/23 1334    Specimen: Blood, Venous Line Updated: 04/03/23 1445    Narrative:      The following orders were created for panel order Extra Tubes.  Procedure                               Abnormality         Status                     ---------                               -----------         ------                     Gold Top - [facility][[id number]]                                   Final result     "             Please view results for these tests on the individual orders.    Banner Top - SST [[id number]] Collected: 04/03/23 1334    Specimen: Blood Updated: 04/03/23 1445     Extra Tube Hold for add-ons.     Comment: Auto resulted.       Urinalysis With Microscopic If Indicated (No Culture) - Urine, Clean Catch [[id number]]  (Abnormal) Collected: 04/03/23 1330    Specimen: Urine, Clean Catch Updated: 04/03/23 1408     Color, UA Yellow     Appearance, UA Clear     pH, UA 6.0     Specific Gravity, UA 1.031     Comment: Result obtained by Refractometer        Glucose, UA >=1000 mg/dL (3+)     Ketones, UA Negative     Bilirubin, UA Negative     Blood, UA Negative     Protein, UA Negative     Leuk Esterase, UA Negative     Nitrite, UA Negative     Urobilinogen, UA 0.2 E.U./dL    Narrative:      Urine microscopic not indicated.    High Sensitivity Troponin T [[id number]]  (Normal) Collected: 04/03/23 1329    Specimen: Blood Updated: 04/03/23 1403     HS Troponin T 11 ng/L     Narrative:      High Sensitive Troponin T Reference Range:  <10.0 ng/L- Negative Female for AMI  <15.0 ng/L- Negative Male for AMI  >=10 - Abnormal Female indicating possible myocardial injury.  >=15 - Abnormal Male indicating possible myocardial injury.   Clinicians would have to utilize clinical acumen, EKG, Troponin, and serial changes to determine if it is an Acute Myocardial Infarction or myocardial injury due to an underlying chronic condition.         TSH [[id number]]  (Normal) Collected: 04/03/23 1329    Specimen: Blood Updated: 04/03/23 1403     TSH 1.630 uIU/mL     D-dimer, Quantitative [[id number]]  (Abnormal) Collected: 04/03/23 1329    Specimen: Blood Updated: 04/03/23 1401     D-Dimer, Quantitative 820 ng/mL (FEU)     Narrative:      According to the assay 's published package insert, a normal (<500 ng/mL (FEU)) D-dimer result in conjunction with a non-high clinical probability assessment, excludes deep vein thrombosis (DVT) and  "pulmonary embolism (PE) with high sensitivity.    D-dimer values increase with age and this can make VTE exclusion of an older population difficult. To address this, the [employer], based on best available evidence and recent guidelines, recommends that clinicians use age-adjusted D-dimer thresholds in patients greater than 50 years of age with: a) a low probability of PE who do not meet all Pulmonary Embolism Rule Out Criteria, or b) in those with intermediate probability of PE.   The formula for an age-adjusted D-dimer cut-off is \"age*10\".  For example, a 60 year old patient would have an age-adjusted cut-off of 600 ng/mL (FEU) and an 80 year old 800 ng/mL (FEU).      Comprehensive Metabolic Panel [[id number]]  (Abnormal) Collected: 04/03/23 1329    Specimen: Blood Updated: 04/03/23 1357     Glucose 229 mg/dL      BUN 13 mg/dL      Creatinine 0.78 mg/dL      Sodium 137 mmol/L      Potassium 4.1 mmol/L      Chloride 101 mmol/L      CO2 28.0 mmol/L      Calcium 9.1 mg/dL      Total Protein 6.3 g/dL      Albumin 3.6 g/dL      ALT (SGPT) 24 U/L      AST (SGOT) 19 U/L      Alkaline Phosphatase 86 U/L      Total Bilirubin 0.2 mg/dL      Globulin 2.7 gm/dL      A/G Ratio 1.3 g/dL      BUN/Creatinine Ratio 16.7     Anion Gap 8.0 mmol/L      eGFR 99.6 mL/min/1.73     Narrative:      GFR Normal >60  Chronic Kidney Disease <60  Kidney Failure <15      Magnesium [[id number]]  (Normal) Collected: 04/03/23 1329    Specimen: Blood Updated: 04/03/23 1357     Magnesium 2.0 mg/dL     CBC & Differential [[id number]]  (Abnormal) Collected: 04/03/23 1329    Specimen: Blood Updated: 04/03/23 1337    Narrative:      The following orders were created for panel order CBC & Differential.  Procedure                               Abnormality         Status                     ---------                               -----------         ------                     CBC Auto Differential[[id number]]        Abnormal            " Final result                 Please view results for these tests on the individual orders.    CBC Auto Differential [[id number]]  (Abnormal) Collected: 04/03/23 1329    Specimen: Blood Updated: 04/03/23 1337     WBC 13.60 10*3/mm3      RBC 4.85 10*6/mm3      Hemoglobin 13.8 g/dL      Hematocrit 42.6 %      MCV 87.8 fL      MCH 28.5 pg      MCHC 32.4 g/dL      RDW 13.3 %      RDW-SD 42.9 fl      MPV 9.8 fL      Platelets 311 10*3/mm3      Neutrophil % 70.8 %      Lymphocyte % 21.3 %      Monocyte % 6.3 %      Eosinophil % 0.4 %      Basophil % 0.6 %      Immature Grans % 0.6 %      Neutrophils, Absolute 9.62 10*3/mm3      Lymphocytes, Absolute 2.90 10*3/mm3      Monocytes, Absolute 0.86 10*3/mm3      Eosinophils, Absolute 0.06 10*3/mm3      Basophils, Absolute 0.08 10*3/mm3      Immature Grans, Absolute 0.08 10*3/mm3      nRBC 0.0 /100 WBC         Imaging Results (Last 24 Hours)     Procedure Component Value Units Date/Time    CT Angiogram Chest [[id number]] Collected: 04/03/23 1600     Updated: 04/03/23 1609    Narrative:      INDICATION:  Shortness of breath.  Elevated d-dimer.    TECHNIQUE:  IV contrast was administered and axial images from the thoracic inlet through  the diaphragms were performed.  3D multiplanar reformats of the thoracic aorta  were completed on a separate workstation under concurrent supervision.    COMPARISON:  9/6/2018.    FINDINGS:  There is mild pulmonary embolism in the right middle and lower lobe pulmonary  arteries extending to the segmental and subsegmental branches in the right lower  lobe.  Peripheral nonocclusive thrombus seen, however, appears to be chronic  rather than acute.  No pleural or pericardial effusion or lymphadenopathy is  seen.  Upper lobe dominant emphysema is seen in the lungs.  Mild bibasilar lung  changes that are more marked on the left may be from pneumonia or atelectasis.  Visualized upper abdomen shows surgical clips from a cholecystectomy with  additional  surgical clips likely from a splenectomy and a distal pancreatectomy.  Splenule noted along the greater curvature of the stomach without the  significant change.  No significant bone abnormality is seen.      Impression:        1.  Exam positive for pulmonary embolism in the right middle and lower lobe  pulmonary artery branches extending to the segmental and subsegmental branches  primarily of the right lower lobe.  Embolic burden is mild.  Given the  peripheral location of the nonocclusive thrombus it is most likely chronic.  No  definite acute pulmonary embolism is seen.    2.  Mild bibasilar lung changes may be secondary to atelectasis or pneumonia.    XR Chest 1 View [[id number]] Collected: 04/03/23 1418     Updated: 04/03/23 1431    Narrative:      AP CHEST:    HISTORY:  Tachycardia.    COMPARISON:  02/01/2019.    FINDINGS:  Slight prominence of the interstitial markings, but no focal airspace disease.  Is there history of tobacco use or reactive airways disease?  Otherwise, mild  viral pneumonitis could be considered. No pleural effusion or pneumothorax was  seen.    The heart is normal in size.  Mediastinal contour is within normal limits.    The visualized osseous structures appear intact.            I have personally reviewed and interpreted the radiology studies and ECG obtained at time of admission.     Assessment / Plan     Assessment:   Active Hospital Problems    Diagnosis    • **Acute on chronic respiratory failure with hypoxia and hypercapnia Initially concerning despite not needing supplemental O2 at this time, the D-dimer is positive but the subsegmental and segmental PE appear chronic at this time. But it seems like he is complicating from COPD to early pneumonia, hence will treat that   • Multiple subsegmental pulmonary emboli without acute cor pulmonale Previously on DOAC but it seems that he had been taken off of it. Given the report, it doesn't look like the PE are new. He had an Echo last  month which is reviewed   • Hyperlipemia Resume statin while here   • Chronic obstructive pulmonary disease Continues to worsen due to his smoking, continue supportive treatment   • Cigarette nicotine dependence, uncomplicated Continues to smoke which worsens his COPD and pneumonia   • Pneumonia Starting him on rocephin and azithromycin, with incentive spirometry. If he is not needing O2 by tomorrow, then would see if he might finish the treatment as outpatient   • diabetes Use basal, prandial and correctional scale insulin while here     Plan: stay today    Code Status/Advanced Care Plan: full     The patient's surrogate decision maker is significant other    I discussed my findings and recommendations with the patient.    Estimated length of stay is 1-3 days     The patient was seen and examined by me on 04/03/23 at 1650.    Electronically signed by Rosalina Bhatt MD, 04/03/23, 16:48 CDT.

## 2023-04-04 LAB
ANION GAP SERPL CALCULATED.3IONS-SCNC: 8 MMOL/L (ref 5–15)
BASOPHILS # BLD AUTO: 0.11 10*3/MM3 (ref 0–0.2)
BASOPHILS NFR BLD AUTO: 1 % (ref 0–1.5)
BUN SERPL-MCNC: 13 MG/DL (ref 8–23)
BUN/CREAT SERPL: 17.6 (ref 7–25)
CALCIUM SPEC-SCNC: 9.1 MG/DL (ref 8.6–10.5)
CHLORIDE SERPL-SCNC: 105 MMOL/L (ref 98–107)
CO2 SERPL-SCNC: 28 MMOL/L (ref 22–29)
CREAT SERPL-MCNC: 0.74 MG/DL (ref 0.76–1.27)
DEPRECATED RDW RBC AUTO: 41.6 FL (ref 37–54)
EGFRCR SERPLBLD CKD-EPI 2021: 101.2 ML/MIN/1.73
EOSINOPHIL # BLD AUTO: 0.14 10*3/MM3 (ref 0–0.4)
EOSINOPHIL NFR BLD AUTO: 1.3 % (ref 0.3–6.2)
ERYTHROCYTE [DISTWIDTH] IN BLOOD BY AUTOMATED COUNT: 13.4 % (ref 12.3–15.4)
GLUCOSE BLDC GLUCOMTR-MCNC: 114 MG/DL (ref 70–130)
GLUCOSE BLDC GLUCOMTR-MCNC: 164 MG/DL (ref 70–130)
GLUCOSE BLDC GLUCOMTR-MCNC: 212 MG/DL (ref 70–130)
GLUCOSE BLDC GLUCOMTR-MCNC: 297 MG/DL (ref 70–130)
GLUCOSE SERPL-MCNC: 182 MG/DL (ref 65–99)
HCT VFR BLD AUTO: 41 % (ref 37.5–51)
HGB BLD-MCNC: 13.6 G/DL (ref 13–17.7)
IMM GRANULOCYTES # BLD AUTO: 0.07 10*3/MM3 (ref 0–0.05)
IMM GRANULOCYTES NFR BLD AUTO: 0.6 % (ref 0–0.5)
LYMPHOCYTES # BLD AUTO: 3.93 10*3/MM3 (ref 0.7–3.1)
LYMPHOCYTES NFR BLD AUTO: 35.4 % (ref 19.6–45.3)
MCH RBC QN AUTO: 28.6 PG (ref 26.6–33)
MCHC RBC AUTO-ENTMCNC: 33.2 G/DL (ref 31.5–35.7)
MCV RBC AUTO: 86.1 FL (ref 79–97)
MONOCYTES # BLD AUTO: 0.96 10*3/MM3 (ref 0.1–0.9)
MONOCYTES NFR BLD AUTO: 8.7 % (ref 5–12)
NEUTROPHILS NFR BLD AUTO: 5.88 10*3/MM3 (ref 1.7–7)
NEUTROPHILS NFR BLD AUTO: 53 % (ref 42.7–76)
NRBC BLD AUTO-RTO: 0 /100 WBC (ref 0–0.2)
PLATELET # BLD AUTO: 318 10*3/MM3 (ref 140–450)
PMV BLD AUTO: 10.5 FL (ref 6–12)
POTASSIUM SERPL-SCNC: 4.3 MMOL/L (ref 3.5–5.2)
RBC # BLD AUTO: 4.76 10*6/MM3 (ref 4.14–5.8)
SODIUM SERPL-SCNC: 141 MMOL/L (ref 136–145)
WBC NRBC COR # BLD: 11.09 10*3/MM3 (ref 3.4–10.8)

## 2023-04-04 PROCEDURE — 80048 BASIC METABOLIC PNL TOTAL CA: CPT | Performed by: HOSPITALIST

## 2023-04-04 PROCEDURE — 93005 ELECTROCARDIOGRAM TRACING: CPT | Performed by: HOSPITALIST

## 2023-04-04 PROCEDURE — 94799 UNLISTED PULMONARY SVC/PX: CPT

## 2023-04-04 PROCEDURE — 97535 SELF CARE MNGMENT TRAINING: CPT

## 2023-04-04 PROCEDURE — G0378 HOSPITAL OBSERVATION PER HR: HCPCS

## 2023-04-04 PROCEDURE — 25010000002 AZITHROMYCIN PER 500 MG: Performed by: HOSPITALIST

## 2023-04-04 PROCEDURE — 93010 ELECTROCARDIOGRAM REPORT: CPT | Performed by: INTERNAL MEDICINE

## 2023-04-04 PROCEDURE — 94664 DEMO&/EVAL PT USE INHALER: CPT

## 2023-04-04 PROCEDURE — 94760 N-INVAS EAR/PLS OXIMETRY 1: CPT

## 2023-04-04 PROCEDURE — 82962 GLUCOSE BLOOD TEST: CPT

## 2023-04-04 PROCEDURE — 25010000002 CEFTRIAXONE PER 250 MG: Performed by: HOSPITALIST

## 2023-04-04 PROCEDURE — 97162 PT EVAL MOD COMPLEX 30 MIN: CPT

## 2023-04-04 PROCEDURE — 97166 OT EVAL MOD COMPLEX 45 MIN: CPT

## 2023-04-04 PROCEDURE — 63710000001 INSULIN DETEMIR PER 5 UNITS: Performed by: HOSPITALIST

## 2023-04-04 PROCEDURE — 63710000001 INSULIN ASPART PER 5 UNITS: Performed by: HOSPITALIST

## 2023-04-04 PROCEDURE — 85025 COMPLETE CBC W/AUTO DIFF WBC: CPT | Performed by: HOSPITALIST

## 2023-04-04 RX ORDER — FLUTICASONE PROPIONATE 50 MCG
1 SPRAY, SUSPENSION (ML) NASAL DAILY
COMMUNITY

## 2023-04-04 RX ORDER — NICOTINE 21 MG/24HR
1 PATCH, TRANSDERMAL 24 HOURS TRANSDERMAL EVERY 24 HOURS
COMMUNITY
Start: 2023-03-28 | End: 2023-05-09

## 2023-04-04 RX ORDER — LUBIPROSTONE 8 UG/1
8 CAPSULE ORAL 2 TIMES DAILY
COMMUNITY

## 2023-04-04 RX ORDER — NICOTINE 21 MG/24HR
1 PATCH, TRANSDERMAL 24 HOURS TRANSDERMAL EVERY 24 HOURS
COMMUNITY
Start: 2023-05-09 | End: 2023-05-23

## 2023-04-04 RX ORDER — NYSTATIN 100000 [USP'U]/G
1 POWDER TOPICAL AS NEEDED
COMMUNITY

## 2023-04-04 RX ORDER — ACETAMINOPHEN 325 MG/1
650 TABLET ORAL EVERY 6 HOURS PRN
COMMUNITY

## 2023-04-04 RX ORDER — INSULIN LISPRO 100 [IU]/ML
8 INJECTION, SOLUTION INTRAVENOUS; SUBCUTANEOUS
COMMUNITY

## 2023-04-04 RX ORDER — POLYETHYLENE GLYCOL 3350 17 G
4 POWDER IN PACKET (EA) ORAL EVERY 6 HOURS PRN
COMMUNITY

## 2023-04-04 RX ORDER — ATORVASTATIN CALCIUM 20 MG/1
20 TABLET, FILM COATED ORAL NIGHTLY
COMMUNITY

## 2023-04-04 RX ORDER — ACETAMINOPHEN 325 MG/1
650 TABLET ORAL EVERY 6 HOURS PRN
Status: DISCONTINUED | OUTPATIENT
Start: 2023-04-04 | End: 2023-04-07 | Stop reason: HOSPADM

## 2023-04-04 RX ORDER — GUAIFENESIN 600 MG/1
1200 TABLET, EXTENDED RELEASE ORAL 2 TIMES DAILY
COMMUNITY
End: 2023-04-14

## 2023-04-04 RX ORDER — ACETAMINOPHEN 650 MG/1
650 SUPPOSITORY RECTAL EVERY 4 HOURS PRN
Status: DISCONTINUED | OUTPATIENT
Start: 2023-04-04 | End: 2023-04-07 | Stop reason: HOSPADM

## 2023-04-04 RX ORDER — CYANOCOBALAMIN 1000 UG/ML
1000 INJECTION, SOLUTION INTRAMUSCULAR; SUBCUTANEOUS
COMMUNITY

## 2023-04-04 RX ORDER — METOPROLOL TARTRATE 5 MG/5ML
5 INJECTION INTRAVENOUS
Status: DISCONTINUED | OUTPATIENT
Start: 2023-04-04 | End: 2023-04-07 | Stop reason: HOSPADM

## 2023-04-04 RX ORDER — DULOXETIN HYDROCHLORIDE 30 MG/1
30 CAPSULE, DELAYED RELEASE ORAL DAILY
COMMUNITY
End: 2023-04-07 | Stop reason: HOSPADM

## 2023-04-04 RX ORDER — HYDROXYZINE PAMOATE 50 MG/1
50 CAPSULE ORAL NIGHTLY PRN
Status: DISCONTINUED | OUTPATIENT
Start: 2023-04-04 | End: 2023-04-06

## 2023-04-04 RX ADMIN — AZITHROMYCIN DIHYDRATE 500 MG: 500 INJECTION, POWDER, LYOPHILIZED, FOR SOLUTION INTRAVENOUS at 18:35

## 2023-04-04 RX ADMIN — IPRATROPIUM BROMIDE AND ALBUTEROL SULFATE 3 ML: 2.5; .5 SOLUTION RESPIRATORY (INHALATION) at 11:17

## 2023-04-04 RX ADMIN — IPRATROPIUM BROMIDE AND ALBUTEROL SULFATE 3 ML: 2.5; .5 SOLUTION RESPIRATORY (INHALATION) at 20:23

## 2023-04-04 RX ADMIN — PANCRELIPASE 12000 UNITS OF LIPASE: 60000; 12000; 38000 CAPSULE, DELAYED RELEASE PELLETS ORAL at 09:32

## 2023-04-04 RX ADMIN — Medication 10 ML: at 09:29

## 2023-04-04 RX ADMIN — PANTOPRAZOLE SODIUM 40 MG: 40 TABLET, DELAYED RELEASE ORAL at 05:09

## 2023-04-04 RX ADMIN — APIXABAN 5 MG: 5 TABLET, FILM COATED ORAL at 20:01

## 2023-04-04 RX ADMIN — INSULIN DETEMIR 41 UNITS: 100 INJECTION, SOLUTION SUBCUTANEOUS at 09:31

## 2023-04-04 RX ADMIN — INSULIN ASPART 5 UNITS: 100 INJECTION, SOLUTION INTRAVENOUS; SUBCUTANEOUS at 12:05

## 2023-04-04 RX ADMIN — INSULIN ASPART 15 UNITS: 100 INJECTION, SOLUTION INTRAVENOUS; SUBCUTANEOUS at 12:04

## 2023-04-04 RX ADMIN — ATORVASTATIN CALCIUM 10 MG: 10 TABLET, FILM COATED ORAL at 09:32

## 2023-04-04 RX ADMIN — INSULIN ASPART 2 UNITS: 100 INJECTION, SOLUTION INTRAVENOUS; SUBCUTANEOUS at 09:30

## 2023-04-04 RX ADMIN — HYDROXYZINE PAMOATE 50 MG: 50 CAPSULE ORAL at 22:59

## 2023-04-04 RX ADMIN — IPRATROPIUM BROMIDE AND ALBUTEROL SULFATE 3 ML: 2.5; .5 SOLUTION RESPIRATORY (INHALATION) at 07:20

## 2023-04-04 RX ADMIN — PANCRELIPASE 12000 UNITS OF LIPASE: 60000; 12000; 38000 CAPSULE, DELAYED RELEASE PELLETS ORAL at 12:04

## 2023-04-04 RX ADMIN — CEFTRIAXONE SODIUM 1 G: 1 INJECTION, POWDER, FOR SOLUTION INTRAMUSCULAR; INTRAVENOUS at 17:42

## 2023-04-04 RX ADMIN — TAMSULOSIN HYDROCHLORIDE 0.4 MG: 0.4 CAPSULE ORAL at 09:33

## 2023-04-04 RX ADMIN — ASPIRIN 81 MG: 81 TABLET, CHEWABLE ORAL at 09:33

## 2023-04-04 RX ADMIN — APIXABAN 5 MG: 5 TABLET, FILM COATED ORAL at 09:32

## 2023-04-04 RX ADMIN — PANCRELIPASE 12000 UNITS OF LIPASE: 60000; 12000; 38000 CAPSULE, DELAYED RELEASE PELLETS ORAL at 18:35

## 2023-04-04 RX ADMIN — CALCIUM POLYCARBOPHIL 1250 MG: 625 TABLET, FILM COATED ORAL at 09:31

## 2023-04-04 RX ADMIN — Medication 10 ML: at 20:35

## 2023-04-04 NOTE — PLAN OF CARE
Goal Outcome Evaluation: Pt in bed resting without difficulty. No c/o pain or discomfort voiced. No acute changes since admission to unit. VSS. No distress. Continues with IV antibiotics, tolerating well with no s/s of adverse reactions noted.

## 2023-04-04 NOTE — THERAPY EVALUATION
Patient Name: Sandoval Navarro  : 1960    MRN: [id number]                              Today's Date: 2023       Admit Date: 4/3/2023    Visit Dx:     ICD-10-CM ICD-9-CM   1. Multiple subsegmental pulmonary emboli without acute cor pulmonale  I26.94 415.19   2. Impaired mobility and ADLs  Z74.09 V49.89    Z78.9      Patient Active Problem List   Diagnosis   • Leukocytosis   • Chest pain   • Dyspnea on exertion   • Chronic fatigue   • PVD (peripheral vascular disease) with claudication   • Cigarette nicotine dependence, uncomplicated   • Overweight (BMI 25.0-29.9)   • Chronic obstructive pulmonary disease   • Carotid stenosis, asymptomatic, right   • ALICE (obstructive sleep apnea)   • Hyperlipemia   • Multiple subsegmental pulmonary emboli without acute cor pulmonale   • Acute on chronic respiratory failure with hypoxia and hypercapnia     Past Medical History:   Diagnosis Date   • Diabetes mellitus    • Gout    • Hyperlipemia      Past Surgical History:   Procedure Laterality Date   • BACK SURGERY     • CHOLECYSTECTOMY     • COLONOSCOPY N/A 3/14/2017    Procedure: COLONOSCOPY;  Surgeon: Cachorro Perez DO;  Location: [facility] ENDOSCOPY;  Service:    • HAND SURGERY     • LEG SURGERY Right    • NOSE SURGERY     • PANCREAS SURGERY     • SPLENECTOMY        General Information     Row Name 23 1410          OT Time and Intention    Document Type evaluation  -SJ     Mode of Treatment physical therapy;occupational therapy  -     Row Name 23 1410          General Information    Patient Profile Reviewed yes  -SJ     Prior Level of Function independent:;feeding;grooming;min assist:;transfer;dressing;bathing;bed mobility  -     Existing Precautions/Restrictions fall  -SJ     Barriers to Rehab medically complex  -SJ     Row Name 23 1410          Living Environment    People in Home facility resident  [facility]  -SJ     Row Name 23 1410          Stairs Within Home, Primary     Stairs, Within Home, Primary At [facility], he walks with a hemiwalker, currently reaching 56 ft. Gets into w/c daily, can self propel.  -     Row Name 04/04/23 1410          Cognition    Orientation Status (Cognition) oriented to;person;place;situation;verbal cues/prompts needed for orientation;time  -     Row Name 04/04/23 1410          Safety Issues, Functional Mobility    Impairments Affecting Function (Mobility) balance;coordination;endurance/activity tolerance;grasp;motor control;motor planning;muscle tone abnormal;pain;strength;sensation/sensory awareness;range of motion (ROM)  -           User Key  (r) = Recorded By, (t) = Taken By, (c) = Cosigned By    Initials Name Provider Type     Alan Holder OT Occupational Therapist                 Mobility/ADL's     Row Name 04/04/23 1410          Bed Mobility    Bed Mobility supine-sit;sit-supine;rolling left;rolling right  -     Rolling Left [place] (Bed Mobility) moderate assist (50% patient effort)  -     Rolling Right [place] (Bed Mobility) modified independence  -     Supine-Sit [place] (Bed Mobility) moderate assist (50% patient effort)  -     Sit-Supine [place] (Bed Mobility) moderate assist (50% patient effort);2 person assist  -     Assistive Device (Bed Mobility) bed rails;head of bed elevated  -[facility] Name 04/04/23 1410          Transfers    Transfers sit-stand transfer  -[facility] Name 04/04/23 1410          Sit-Stand Transfer    Sit-Stand [place] (Transfers) minimum assist (75% patient effort)  -     Assistive Device (Sit-Stand Transfers) walker, front-wheeled  -     Comment, (Sit-Stand Transfer) usually uses hemiwalker at NH  -     Row Name 04/04/23 1410          Activities of Daily Living    BADL Assessment/Intervention upper body dressing;toileting;lower body dressing  -[facility] Name 04/04/23 1410          Upper Body Dressing Assessment/Training    [place] Level (Upper Body  Dressing) doff;don;minimum assist (75% patient effort)  -[facility] Name 04/04/23 1410          Toileting Assessment/Training    [place] Level (Toileting) minimum assist (75% patient effort)  -SJ     Row Name 04/04/23 1410          Lower Body Dressing Assessment/Training    [place] Level (Lower Body Dressing) moderate assist (50% patient effort);doff;don;pants/bottoms;socks  -           User Key  (r) = Recorded By, (t) = Taken By, (c) = Cosigned By    Initials Name Provider Type     Alan Holder OT Occupational Therapist               Obj/Interventions     [facility] Name 04/04/23 1410          Sensory Assessment (Somatosensory)    Sensory Assessment (Somatosensory) right UE;UE sensation intact;left UE  -     Left UE Sensory Assessment impaired  -     Sensory Assessment L hand with deminished sensation  -SJ     Row Name 04/04/23 1410          Range of Motion Comprehensive    General Range of Motion other (see comments)  -     Comment, General Range of Motion R UE WFL; L shoulder flexion 90º (PROM), L shoulder abduction 90º (PROM), L elbow WFL PROM with high tone), L hand and wrist WFL (PROM)  -SJ     Row Name 04/04/23 1410          Strength Comprehensive (MMT)    General Manual Muscle Testing (MMT) Assessment other (see comments)  -     Comment, General Manual Muscle Testing (MMT) Assessment RUE 4+/5 grossly, L UE 1/5 grossly (high tone)  -           User Key  (r) = Recorded By, (t) = Taken By, (c) = Cosigned By    Initials Name Provider Type     Alan Holder OT Occupational Therapist               Goals/Plan     Row Name 04/04/23 1410          Transfer Goal 1 (OT)    Activity/Assistive Device (Transfer Goal 1, OT) toilet;shower chair  -     [place] Level/Cues Needed (Transfer Goal 1, OT) contact guard required  -     Time Frame (Transfer Goal 1, OT) long term goal (LTG);by discharge  -     Progress/Outcome (Transfer Goal 1, OT) goal not met  -SJ     Row Name 04/04/23 1410           Bathing Goal 1 (OT)    Activity/Device (Bathing Goal 1, OT) lower body bathing  -SJ     [place] Level/Cues Needed (Bathing Goal 1, OT) contact guard required  -SJ     Time Frame (Bathing Goal 1, OT) long term goal (LTG);by discharge  -SJ     Progress/Outcomes (Bathing Goal 1, OT) goal not met  -     Row Name 04/04/23 1410          Dressing Goal 1 (OT)    Activity/Device (Dressing Goal 1, OT) lower body dressing  -SJ     [place]/Cues Needed (Dressing Goal 1, OT) contact guard required  -SJ     Time Frame (Dressing Goal 1, OT) long term goal (LTG);by discharge  -     Progress/Outcome (Dressing Goal 1, OT) goal not met  -     Row Name 04/04/23 1410          Toileting Goal 1 (OT)    Activity/Device (Toileting Goal 1, OT) toileting skills, all  -SJ     [place] Level/Cues Needed (Toileting Goal 1, OT) contact guard required  -SJ     Time Frame (Toileting Goal 1, OT) long term goal (LTG);by discharge  -     Progress/Outcome (Toileting Goal 1, OT) goal not met  -     Row Name 04/04/23 1410          Therapy Assessment/Plan (OT)    Planned Therapy Interventions (OT) activity tolerance training;IADL retraining;BADL retraining;adaptive equipment training;manual therapy/joint mobilization;cognitive/visual perception retraining;edema control/reduction;neuromuscular control/coordination retraining;functional balance retraining;occupation/activity based interventions;ROM/therapeutic exercise;strengthening exercise;transfer/mobility retraining;passive ROM/stretching;patient/caregiver education/training  -           User Key  (r) = Recorded By, (t) = Taken By, (c) = Cosigned By    Initials Name Provider Type     Alan Holder, OT Occupational Therapist               Clinical Impression     Row Name 04/04/23 1410          Pain Assessment    Pretreatment Pain Rating 2/10  -SJ     Posttreatment Pain Rating 2/10  -SJ     Pain Location - chest  -     Pre/Posttreatment Pain Comment chest discomfort,  localized in center of chest, non-radiating  -     Row Name 04/04/23 1410          Plan of Care Review    Outcome Evaluation OT eval complete, co-eval with PT, supine in bed upon arrival. Alert x 3, pleasant and agreeable for evaluation. Patient with hx of CVA with L hemiparesis. L UE with limited AROM, presents with increased tone, notably in elbow extensors (can achieve full ROM with low load stretch); does have deminished sensation to LUE. Rolling L mod A, rolling R with modified independence. Supine to sit with mod A, sit to supine with mod A x 2 person. LE dressing with mod A, toileting with min A. Sit to stand with mod A and RW. Patient feeling faint in standing, RN entering room stating HR in 140's per telemetry, Returned to bed, all needs in reach. Patient with increased HR with standing, decreased safety in transfers, decreaed independence in ADLs, and L UE hemiparesis. Cont inpatient OT. Will benefit from rehab.  -     Row Name 04/04/23 1410          Therapy Assessment/Plan (OT)    Patient/Family Therapy Goal Statement (OT) continue rehab  -     Rehab Potential (OT) good, to achieve stated therapy goals  -     Criteria for Skilled Therapeutic Interventions Met (OT) yes;skilled treatment is necessary  -     Therapy Frequency (OT) other (see comments)  5-7 d/wk  -     Predicted Duration of Therapy Intervention (OT) until d/c or all goals met  -     Row Name 04/04/23 1410          Therapy Plan Review/Discharge Plan (OT)    Anticipated Discharge Disposition (OT) inpatient rehabilitation facility;skilled nursing facility  -     Row Name 04/04/23 1410          Vital Signs    Pre Systolic BP Rehab 128  -SJ     Pre Treatment Diastolic BP 64  -SJ     Intra Systolic BP Rehab 122  -SJ     Intra Treatment Diastolic BP 81  -SJ     Post Systolic BP Rehab 132  -SJ     Post Treatment Diastolic BP 77  -SJ     Pretreatment Heart Rate (beats/min) 93  -SJ     Intratreatment Heart Rate (beats/min) 124  Per  telemetry 140's  -SJ     Posttreatment Heart Rate (beats/min) 106  -SJ     Pre SpO2 (%) 100  -SJ     O2 Delivery Pre Treatment room air  -SJ     Post SpO2 (%) 98  -SJ     Pre Patient Position Supine  -SJ     Intra Patient Position Supine  -SJ     Post Patient Position Supine  -SJ     Row Name 04/04/23 1410          Positioning and Restraints    Pre-Treatment Position in bed  -SJ     Post Treatment Position bed  -SJ     In Bed notified nsg;supine;call light within reach;encouraged to call for assist;exit alarm on;side rails up x2;with PT  -SJ           User Key  (r) = Recorded By, (t) = Taken By, (c) = Cosigned By    Initials Name Provider Type     Alan Holder, OT Occupational Therapist               Outcome Measures     Row Name 04/04/23 1410          How much help from another is currently needed...    Putting on and taking off regular lower body clothing? 2  -SJ     Bathing (including washing, rinsing, and drying) 2  -SJ     Toileting (which includes using toilet bed pan or urinal) 3  -SJ     Putting on and taking off regular upper body clothing 3  -SJ     Taking care of personal grooming (such as brushing teeth) 3  -SJ     Eating meals 4  -SJ     AM-PAC 6 Clicks Score (OT) 17  -SJ     Row Name 04/04/23 0800          How much help from another person do you currently need...    Turning from your back to your side while in flat bed without using bedrails? 2  -MB     Moving from lying on back to sitting on the side of a flat bed without bedrails? 2  -MB     Moving to and from a bed to a chair (including a wheelchair)? 2  -MB     Standing up from a chair using your arms (e.g., wheelchair, bedside chair)? 2  -MB     Climbing 3-5 steps with a railing? 2  -MB     To walk in hospital room? 2  -MB     AM-PAC 6 Clicks Score (PT) 12  -MB     Highest level of mobility 4 --> Transferred to chair/commode  -MB     Row Name 04/04/23 1410          Functional Assessment    Outcome Measure Options AM-PAC 6 Clicks Daily  Activity (OT)  -           User Key  (r) = Recorded By, (t) = Taken By, (c) = Cosigned By    Initials Name Provider Type    Jerilyn Hamilton, RN Registered Nurse     Alan Holder OT Occupational Therapist                Occupational Therapy Education     Title: PT OT SLP Therapies (In Progress)     Topic: Occupational Therapy (In Progress)     Point: ADL training (Done)     Description:   Instruct learner(s) on proper safety adaptation and remediation techniques during self care or transfers.   Instruct in proper use of assistive devices.              Learning Progress Summary           Patient Acceptance, E,TB, VU,NR by  at 4/4/2023 2221    Comment: POC, role of OT, transfer training                   Point: Home exercise program (Not Started)     Description:   Instruct learner(s) on appropriate technique for monitoring, assisting and/or progressing therapeutic exercises/activities.              Learner Progress:  Not documented in this visit.          Point: Precautions (Not Started)     Description:   Instruct learner(s) on prescribed precautions during self-care and functional transfers.              Learner Progress:  Not documented in this visit.          Point: Body mechanics (Not Started)     Description:   Instruct learner(s) on proper positioning and spine alignment during self-care, functional mobility activities and/or exercises.              Learner Progress:  Not documented in this visit.                      User Key     Initials Effective Dates Name Provider Type Discipline     06/14/21 -  Alan Holder OT Occupational Therapist OT              OT Recommendation and Plan  Planned Therapy Interventions (OT): activity tolerance training, IADL retraining, BADL retraining, adaptive equipment training, manual therapy/joint mobilization, cognitive/visual perception retraining, edema control/reduction, neuromuscular control/coordination retraining, functional balance retraining,  occupation/activity based interventions, ROM/therapeutic exercise, strengthening exercise, transfer/mobility retraining, passive ROM/stretching, patient/caregiver education/training  Therapy Frequency (OT): other (see comments) (5-7 d/wk)  Plan of Care Review  Outcome Evaluation: OT eval complete, co-eval with PT, supine in bed upon arrival. Alert x 3, pleasant and agreeable for evaluation. Patient with hx of CVA with L hemiparesis. L UE with limited AROM, presents with increased tone, notably in elbow extensors (can achieve full ROM with low load stretch); does have deminished sensation to LUE. Rolling L mod A, rolling R with modified independence. Supine to sit with mod A, sit to supine with mod A x 2 person. LE dressing with mod A, toileting with min A. Sit to stand with mod A and RW. Patient feeling faint in standing, RN entering room stating HR in 140's per telemetry, Returned to bed, all needs in reach. Patient with increased HR with standing, decreased safety in transfers, decreaed independence in ADLs, and L UE hemiparesis. Cont inpatient OT. Will benefit from rehab.     Time Calculation:    Time Calculation- OT     Row Name 04/04/23 1505             Time Calculation- OT    OT Start Time 1410  -      OT Stop Time 1505  -      OT Time Calculation (min) 55 min  -SJ      OT Received On 04/04/23  -      OT Goal Re-Cert Due Date 04/17/23  -         Untimed Charges    OT Eval/Re-eval Minutes 55  -SJ         Total Minutes    Untimed Charges Total Minutes 55  -SJ       Total Minutes 55  -SJ            User Key  (r) = Recorded By, (t) = Taken By, (c) = Cosigned By    Initials Name Provider Type     Alan Holder OT Occupational Therapist              Therapy Charges for Today     Code Description Service Date Service Provider Modifiers Qty    [id number] HC OT EVAL MOD COMPLEXITY 3 4/4/2023 Alan Holder OT GO 1               Alan Holder OT  4/4/2023

## 2023-04-04 NOTE — THERAPY EVALUATION
Patient Name: Sandoval Navarro  : 1960    MRN: [id number]                              Today's Date: 2023     PT Evaluation  Admit Date: 4/3/2023    Visit Dx:     ICD-10-CM ICD-9-CM   1. Multiple subsegmental pulmonary emboli without acute cor pulmonale  I26.94 415.19   2. Impaired mobility and ADLs  Z74.09 V49.89    Z78.9    3. Impaired functional mobility, balance, gait, and endurance  Z74.09 V49.89     Patient Active Problem List   Diagnosis   • Leukocytosis   • Chest pain   • Dyspnea on exertion   • Chronic fatigue   • PVD (peripheral vascular disease) with claudication   • Cigarette nicotine dependence, uncomplicated   • Overweight (BMI 25.0-29.9)   • Chronic obstructive pulmonary disease   • Carotid stenosis, asymptomatic, right   • ALICE (obstructive sleep apnea)   • Hyperlipemia   • Multiple subsegmental pulmonary emboli without acute cor pulmonale   • Acute on chronic respiratory failure with hypoxia and hypercapnia     Past Medical History:   Diagnosis Date   • Diabetes mellitus    • Gout    • Hyperlipemia      Past Surgical History:   Procedure Laterality Date   • BACK SURGERY     • CHOLECYSTECTOMY     • COLONOSCOPY N/A 3/14/2017    Procedure: COLONOSCOPY;  Surgeon: Cachorro Perez DO;  Location: [facility] ENDOSCOPY;  Service:    • HAND SURGERY     • LEG SURGERY Right    • NOSE SURGERY     • PANCREAS SURGERY     • SPLENECTOMY        General Information     Row Name 23 1411          Physical Therapy Time and Intention    Document Type evaluation  -GB     Mode of Treatment physical therapy;occupational therapy  -GB     Row Name 23 1411          General Information    Patient Profile Reviewed yes  -GB     Prior Level of Function independent:;feeding;grooming;min assist:;gait;transfer;bed mobility  -GB     Existing Precautions/Restrictions fall   watch VS, carlos alberto ex kathy due to PEs watching HR Sats b/p  -GB     Barriers to Rehab medically complex;previous functional deficit  -GB     Row  Name 04/04/23 1411          Living Environment    People in Home facility resident  -     Row Name 04/04/23 1411          Stairs Within Home, Primary    Stairs, Within Home, Primary uses bryanna walker for gait at NH walking 56 ft w/ assist; up in chair daily w/ assist and self propels;  -     Row Name 04/04/23 1411          Cognition    Orientation Status (Cognition) oriented to;person;place;situation;verbal cues/prompts needed for orientation;time  -     Row Name 04/04/23 1411          Safety Issues, Functional Mobility    Impairments Affecting Function (Mobility) balance;coordination;endurance/activity tolerance;grasp;motor control;motor planning;muscle tone abnormal;pain;strength;sensation/sensory awareness;range of motion (ROM)  -           User Key  (r) = Recorded By, (t) = Taken By, (c) = Cosigned By    Initials Name Provider Type    Nargis Acosta, PT Physical Therapist               Mobility     Row Name 04/04/23 1411          Bed Mobility    Bed Mobility supine-sit;sit-supine;rolling left;rolling right  -     Rolling Left [place] (Bed Mobility) moderate assist (50% patient effort)  -     Rolling Right [place] (Bed Mobility) modified independence  -     Supine-Sit [place] (Bed Mobility) moderate assist (50% patient effort)  -     Sit-Supine [place] (Bed Mobility) moderate assist (50% patient effort);2 person assist  -     Assistive Device (Bed Mobility) bed rails;head of bed elevated  -     Row Name 04/04/23 1411          Sit-Stand Transfer    Sit-Stand [place] (Transfers) minimum assist (75% patient effort)  -     Assistive Device (Sit-Stand Transfers) walker, front-wheeled  -     Comment, (Sit-Stand Transfer) stood w/ min assist of 2 but RN entered stating tele called and HR elevated, so we returned him supine. He endorsed light headed feeling that abated after laying supine at least 10 min;  -     Row Name 04/04/23 1411          Gait/Stairs  (Locomotion)    [place] Level (Gait) unable to assess  -     Distance in Feet (Gait) 0- returned to sitting/ then supine post standing due to HR elevation and light headed feeling  -     [place] Level (Stairs) unable to assess;not tested  -           User Key  (r) = Recorded By, (t) = Taken By, (c) = Cosigned By    Initials Name Provider Type    Nargis Acosta PT Physical Therapist               Obj/Interventions     Row Name 04/04/23 1459          Range of Motion Comprehensive    General Range of Motion bilateral lower extremity ROM WFL  -     Row Name 04/04/23 1459          Strength Comprehensive (MMT)    Comment, General Manual Muscle Testing (MMT) Assessment RLE grossly 4/5 hip knee ankle flx/ext.  With RLE in hooklying position, pt can flex  LLE  hip knee if he hyperflexes hip and allows knee to flex with gravity assist, then places foot on bed to simulate RLE hooklying position. He can inch LLE up to nearly meet RLE. He can maintain knee flx and  avoid it sliding into ext w/ wt on heel L for proximation. He cannot do usual heel slides in supine w/ LLE w/out the above described move. He does not show active L dorsiflx but when held in hooklying he can assist L DF w/ R foot altho 3 beat clonus observed with this activity, he was still able to get some firing of ant tib w/ the AAROM of R foot.  He is also able to hold LEs in hooklying and do mild hip rotation to each side w/out losing control LLE into ext.  In these tasks he shows ability to move LLE out of synergy w inhibition of RLE in flx and wt on heel to inhibit tone. This gives good hope he can respond to more neuromuscular re education if consistent.  -     Row Name 04/04/23 1459          Motor Skills    Therapeutic Exercise ankle;hip  -     Row Name 04/04/23 1459          Hip (Therapeutic Exercise)    Hip (Therapeutic Exercise) AAROM (active assistive range of motion)  -     Hip AAROM (Therapeutic Exercise)  left;flexion;aBduction;aDduction;supine;10 repetitions  hooklying lore LEs and hip rotation laterally; holding hip flx lore in hooklying  -     Row Name 04/04/23 1459          Ankle (Therapeutic Exercise)    Ankle (Therapeutic Exercise) AAROM (active assistive range of motion)  -     Ankle AAROM (Therapeutic Exercise) bilateral;dorsiflexion;10 repetitions  in hooklying w/ R foot under L medial foot doing DF AAROM;  -     Row Name 04/04/23 1459          Balance    Balance Assessment sitting static balance;standing dynamic balance  -     Static Sitting Balance independent;supervision  -     Position, Sitting Balance unsupported;sitting edge of bed  -     Dynamic Standing Balance moderate assist;2-person assist  -     Position/Device Used, Standing Balance supported  -     Row Name 04/04/23 1459          Sensory Assessment (Somatosensory)    Sensory Assessment (Somatosensory) LE sensation intact  -           User Key  (r) = Recorded By, (t) = Taken By, (c) = Cosigned By    Initials Name Provider Type     Nargis Garcia, PT Physical Therapist               Goals/Plan     [facility] Name 04/04/23 1712          Bed Mobility Goal 1 (PT)    Activity/Assistive Device (Bed Mobility Goal 1, PT) bed mobility activities, all  -     [place] Level/Cues Needed (Bed Mobility Goal 1, PT) modified independence  -     Time Frame (Bed Mobility Goal 1, PT) 1 week  -     Progress/Outcomes (Bed Mobility Goal 1, PT) goal partially met  -     Row Name 04/04/23 1712          Transfer Goal 1 (PT)    Activity/Assistive Device (Transfer Goal 1, PT) bed-to-chair/chair-to-bed  -     [place] Level/Cues Needed (Transfer Goal 1, PT) standby assist  -     Time Frame (Transfer Goal 1, PT) 2 weeks  -GB     Progress/Outcome (Transfer Goal 1, PT) goal not met  -     Row Name 04/04/23 1712          Gait Training Goal 1 (PT)    Activity/Assistive Device (Gait Training Goal 1, PT) gait (walking  locomotion);assistive device use;walker, rolling;decrease fall risk;decrease asymmetrical patterns;diminish gait deviation  -GB     [place] Level (Gait Training Goal 1, PT) minimum assist (75% or more patient effort)  -GB     Distance (Gait Training Goal 1, PT) 10 ft w/ FWRW and step to or step thru  gait LLE w/out LOB w/ VSS  -GB     Time Frame (Gait Training Goal 1, PT) 10 days  -GB     Progress/Outcome (Gait Training Goal 1, PT) goal not met  -GB     Row Name 04/04/23 1712          Problem Specific Goal 1 (PT)    Problem Specific Goal 1 (PT) standing balance min assist of 1 w/ VSS and no lightheaded feeling  -GB     Time Frame (Problem Specific Goal 1, PT) 5 days  -GB     Progress/Outcome (Problem Specific Goal 1, PT) goal not met  -GB     Row Name 04/04/23 1712          Patient Education Goal (PT)    Activity (Patient Education Goal, PT) Pt will demo indep ther ex for NMR to be able to complete these tasks alone as HEP  -GB     [place]/Cues/Accuracy (Memory Goal 2, PT) demonstrates adequately  -GB     Time Frame (Patient Education Goal, PT) 1 week  -GB     Progress/Outcome (Patient Education Goal, PT) new goal  -GB     Row Name 04/04/23 1712          Therapy Assessment/Plan (PT)    Planned Therapy Interventions (PT) balance training;bed mobility training;gait training;home exercise program;motor coordination training;neuromuscular re-education;patient/family education;postural re-education;ROM (range of motion);stair training;transfer training;strengthening;wheelchair management/propulsion training  -GB           User Key  (r) = Recorded By, (t) = Taken By, (c) = Cosigned By    Initials Name Provider Type    GB Nargis Garcia, PT Physical Therapist               Clinical Impression     Row Name 04/04/23 1411          Pain    Pretreatment Pain Rating 2/10  -GB     Posttreatment Pain Rating 2/10  -GB     Pain Location - chest  -GB     Pre/Posttreatment Pain Comment center chest, reports hes  had this  -     Pain Intervention(s) Repositioned  -GB     Row Name 04/04/23 1411          Plan of Care Review    Plan of Care Reviewed With patient;spouse  -     Outcome Evaluation PT Eval completed w/ OT eval. Pt resides in [place] where he is gotten up daily and is working on gait w/ bryanna walker and assist of staff. He shows 4/5 RLE strength w/out abnormal tone, His LLE was able to move against gravity but had difficulty doing indep knee flx unless he hyperflexed L hip and allowed knee to fall into flx while in hooklying. He was able to hold it w/ heel taking wt on bed and RLE also in hooklying position. He is also able to rotate hips to each direction while maintaining hooklying position but need to be sure heel is anchored in flx to keep LLE from sliding into extension.  His ability to move outside of synergy patterns despite some tone is impressive. 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 He has been in [place] for rehab successfully and is motivated to cont work in this area.  He could benefit from rehab placement if possible to focus on more control out of synergy if available to him. PT will follow to attempt progression in [facility] while here. Good rehab candidate.  -     Row Name 04/04/23 1411          Therapy Assessment/Plan (PT)    Rehab Potential (PT) good, to achieve stated therapy goals  -     Criteria for Skilled Interventions Met (PT) yes  -GB     Therapy Frequency (PT) other (see comments)  5-7 d/w;  -GB     Row Name 04/04/23 1411          Vital Signs    Pre Systolic BP Rehab 128  -GB     Pre Treatment Diastolic BP 64  -GB     Intra Systolic BP Rehab 122  -GB     Intra Treatment Diastolic BP 81  -GB     Post Systolic BP Rehab 132  -GB     Post Treatment Diastolic BP 77  -GB     Pretreatment Heart Rate (beats/min) 93  -GB     Intratreatment Heart Rate (beats/min) 124  tele called RN and told her HR 140s when he was standing at EOB. We returned him to supine  where he recovered. He stated he felt light headed but this abated post laying supine  -GB     Posttreatment Heart Rate (beats/min) 106  -GB     Pre SpO2 (%) 100  -GB     O2 Delivery Pre Treatment room air  -GB     Post SpO2 (%) 98  -GB     O2 Delivery Post Treatment room air  -GB     Pre Patient Position Supine  -GB     Intra Patient Position Sitting  -GB     Post Patient Position Supine  -GB     Recovery Time 127/76 HR 99 sats 97% RA post recovery ~10 mn  supine  -GB     Row Name 04/04/23 1411          Positioning and Restraints    Pre-Treatment Position in bed  -GB     Post Treatment Position bed  -GB     In Bed with family/caregiver;supine;call light within reach;encouraged to call for assist;exit alarm on;side rails up x2  -GB           User Key  (r) = Recorded By, (t) = Taken By, (c) = Cosigned By    Initials Name Provider Type    Nargis Acosta, PT Physical Therapist               Outcome Measures     Row Name 04/04/23 1715 04/04/23 0800       How much help from another person do you currently need...    Turning from your back to your side while in flat bed without using bedrails? 3  -GB 2  -MB    Moving from lying on back to sitting on the side of a flat bed without bedrails? 2  -GB 2  -MB    Moving to and from a bed to a chair (including a wheelchair)? 2  -GB 2  -MB    Standing up from a chair using your arms (e.g., wheelchair, bedside chair)? 2  -GB 2  -MB    Climbing 3-5 steps with a railing? 1  -GB 2  -MB    To walk in hospital room? 2  -GB 2  -MB    AM-PAC 6 Clicks Score (PT) 12  -GB 12  -MB    Highest level of mobility 4 --> Transferred to chair/commode  -GB 4 --> Transferred to chair/commode  -MB    Row Name 04/04/23 1410          Functional Assessment    Outcome Measure Options AM-PAC 6 Clicks Daily Activity (OT)  -SJ           User Key  (r) = Recorded By, (t) = Taken By, (c) = Cosigned By    Initials Name Provider Type    Nargis Acosta, JEAN CARLOS Physical Therapist    MB  Jerilyn Cortez, RN Registered Nurse    Alan Velazquez OT Occupational Therapist                             Physical Therapy Education     Title: PT OT SLP Therapies (In Progress)     Topic: Physical Therapy (Done)     Point: Mobility training (Done)     Learning Progress Summary           Patient Acceptance, E,D, VU,DU,NR by  at 4/4/2023 1716    Comment: POC; hooklying RLE then pull up LLE into flx and maintain hooklying; as able, progress to hip rotation R/L wo/out losing control LLE; try DF L foot by using R foot under L to get DF P/AAROM  L foot                   Point: Home exercise program (Done)     Learning Progress Summary           Patient Acceptance, E,D, VU,DU,NR by  at 4/4/2023 1716    Comment: POC; hooklying RLE then pull up LLE into flx and maintain hooklying; as able, progress to hip rotation R/L wo/out losing control LLE; try DF L foot by using R foot under L to get DF P/AAROM  L foot                   Point: Body mechanics (Done)     Learning Progress Summary           Patient Acceptance, E,D, VU,DU,NR by  at 4/4/2023 1716    Comment: POC; hooklying RLE then pull up LLE into flx and maintain hooklying; as able, progress to hip rotation R/L wo/out losing control LLE; try DF L foot by using R foot under L to get DF P/AAROM  L foot                   Point: Precautions (Done)     Learning Progress Summary           Patient Acceptance, E,D, VU,DU,NR by  at 4/4/2023 1716    Comment: POC; hooklying RLE then pull up LLE into flx and maintain hooklying; as able, progress to hip rotation R/L wo/out losing control LLE; try DF L foot by using R foot under L to get DF P/AAROM  L foot                               User Key     Initials Effective Dates Name Provider Type Discipline    MAKSIM 06/16/21 -  Nargis Garcia PT Physical Therapist PT              PT Recommendation and Plan  Planned Therapy Interventions (PT): balance training, bed mobility training, gait training, home exercise  program, motor coordination training, neuromuscular re-education, patient/family education, postural re-education, ROM (range of motion), stair training, transfer training, strengthening, wheelchair management/propulsion training  Plan of Care Reviewed With: patient, spouse  Outcome Evaluation: PT Eval completed w/ OT eval. Pt resides in [place] where he is gotten up daily and is working on gait w/ bryanna walker and assist of staff. He shows 4/5 RLE strength w/out abnormal tone, His LLE was able to move against gravity but had difficulty doing indep knee flx unless he hyperflexed L hip and allowed knee to fall into flx while in hooklying. He was able to hold it w/ heel taking wt on bed and RLE also in hooklying position. He is also able to rotate hips to each direction while maintaining hooklying position but need to be sure heel is anchored in flx to keep LLE from sliding into extension.  His ability to move outside of synergy patterns despite some tone is impressive. 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 He has been in [place] for rehab successfully and is motivated to cont work in this area.  He could benefit from rehab placement if possible to focus on more control out of synergy if available to him. PT will follow to attempt progression in [place] while here. Good rehab candidate.     Time Calculation:    PT Charges     Row Name 04/04/23 1410             Time Calculation    Start Time 1410  -GB      Stop Time 1515  -GB      Time Calculation (min) 65 min  -GB      PT Received On 04/04/23  -GB      PT Goal Re-Cert Due Date 04/13/23  -GB         Time Calculation- PT    Total Timed Code Minutes- PT 10 minute(s)  -GB         Timed Charges    82042 - PT Self Care/Mgmt Minutes 10  -GB         Untimed Charges    PT Eval/Re-eval Minutes 55  -GB         Total Minutes    Timed Charges Total Minutes 10  -GB      Untimed Charges Total Minutes 55  -GB       Total Minutes 65  -GB            User  Key  (r) = Recorded By, (t) = Taken By, (c) = Cosigned By    Initials Name Provider Type    Nargis Acosta, PT Physical Therapist              Therapy Charges for Today     Code Description Service Date Service Provider Modifiers Qty    41434604384 HC PT EVAL MOD COMPLEXITY 4 4/4/2023 Nargis Garcia, PT GP 1    08470194255 HC PT SELF CARE/MGMT/TRAIN EA 15 MIN 4/4/2023 Nargis Garcia, PT GP 1          PT G-Codes  Outcome Measure Options: AM-PAC 6 Clicks Daily Activity (OT)  AM-PAC 6 Clicks Score (PT): 12  AM-PAC 6 Clicks Score (OT): 17  PT Discharge Summary  Anticipated Discharge Disposition (PT): inpatient rehabilitation facility, other (see comments) (rehab program to home)    Nargis Garcai, PT  4/4/2023

## 2023-04-04 NOTE — SIGNIFICANT NOTE
Pt blood glucose noted at 53 via pt dexcom device. Gave pt orange juice and a sandwich at this time

## 2023-04-04 NOTE — PROGRESS NOTES
Walking oximetry unable to be completed due to paralization. Patient is currently 98% on room air resting. Dr. Bhatt aware of situation.

## 2023-04-04 NOTE — PLAN OF CARE
Goal Outcome Evaluation:  Plan of Care Reviewed With: patient, spouse           Outcome Evaluation: PT Eval completed w/ OT eval. Pt resides in [place] where he is gotten up daily and is working on gait w/ bryanna walker and assist of staff. He shows 4/5 RLE strength w/out abnormal tone, His LLE was able to move against gravity but had difficulty doing indep knee flx unless he hyperflexed L hip and allowed knee to fall into flx while in hooklying. He was able to hold it w/ heel taking wt on bed and RLE also in hooklying position. He is also able to rotate hips to each direction while maintaining hooklying position but need to be sure heel is anchored in flx to keep LLE from sliding into extension.  His ability to move outside of synergy patterns despite some tone is impressive. 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 He has been in [place] for rehab successfully and is motivated to cont work in this area.  He could benefit from rehab placement if possible to focus on more control out of synergy if available to him. PT will follow to attempt progression in [place] while here. Good rehab candidate.

## 2023-04-04 NOTE — PROGRESS NOTES
[facility] Medicine   INPATIENT PROGRESS NOTE      Patient Name: Sandoval Navarro  Date of Admission: 4/3/2023  Today's Date: 04/04/23  Length of Stay: 0  Primary Care Physician: Guanako Atkins MD    Subjective   Chief Complaint:   HPI   Reclined in bed, significant other in room. He still has some chest tightness and dry coughing but no sputum. Occasional dyspnea when he moves but that is unchanged from before.   Review of Systems   All pertinent negatives and positives are as above. All other systems have been reviewed and are negative unless otherwise stated.     Objective    Temp:  [97.6 °F (36.4 °C)-98.8 °F (37.1 °C)] 97.6 °F (36.4 °C)  Heart Rate:  [] 87  Resp:  [18-20] 18  BP: ()/(58-86) 124/67  Physical Exam  NAD, reclined in bed  PERRL, EOMI  Mucosae moist  Breathing unlabored  Rate controlled. Normotensive  Moving all limbs, other than weakness from stroke  Skin intact    Results Review:  I have reviewed the labs, radiology results, and diagnostic studies.    Laboratory Data:   Results from last 7 days   Lab Units 04/04/23  0736 04/03/23  1329   WBC 10*3/mm3 11.09* 13.60*   HEMOGLOBIN g/dL 13.6 13.8   HEMATOCRIT % 41.0 42.6   PLATELETS 10*3/mm3 318 311        Results from last 7 days   Lab Units 04/04/23  0736 04/03/23  1329   SODIUM mmol/L 141 137   POTASSIUM mmol/L 4.3 4.1   CHLORIDE mmol/L 105 101   CO2 mmol/L 28.0 28.0   BUN mg/dL 13 13   CREATININE mg/dL 0.74* 0.78   CALCIUM mg/dL 9.1 9.1   BILIRUBIN mg/dL  --  0.2   ALK PHOS U/L  --  86   ALT (SGPT) U/L  --  24   AST (SGOT) U/L  --  19   GLUCOSE mg/dL 182* 229*     Culture Data:   No results found for: BLOODCX  No results found for: URINECX  No results found for: RESPCX  No results found for: WOUNDCX  No results found for: STOOLCX  No components found for: BODYFLD    Radiology Data:   Imaging Results (Last 24 Hours)     Procedure Component Value Units Date/Time    CT Angiogram  Chest [[id number]] Collected: 04/03/23 1600     Updated: 04/03/23 1653    Narrative:      INDICATION:  Shortness of breath.  Elevated d-dimer.    TECHNIQUE:  IV contrast was administered and axial images from the thoracic inlet through  the diaphragms were performed.  3D multiplanar reformats of the thoracic aorta  were completed on a separate workstation under concurrent supervision.    COMPARISON:  9/6/2018.    FINDINGS:  There is mild pulmonary embolism in the right middle and lower lobe pulmonary  arteries extending to the segmental and subsegmental branches in the right lower  lobe.  Peripheral nonocclusive thrombus seen, however, appears to be chronic  rather than acute.    No pleural or pericardial effusion or lymphadenopathy is seen.  Upper lobe  dominant emphysema is seen in the lungs.  Mild bibasilar lung changes that are  more marked on the left may be from pneumonia or atelectasis.    Visualized upper abdomen shows surgical clips from a cholecystectomy with  additional surgical clips likely from a splenectomy and a distal pancreatectomy.  Splenules noted along the greater curvature of the stomach without significant  change.  No significant bone abnormality is seen.      Impression:        1.  Exam positive for pulmonary embolism in right middle and lower lobe  pulmonary artery branches extending to the segmental and subsegmental branches  primarily of the right lower lobe.  Embolic burden is mild.  Given the  peripheral location of the nonocclusive thrombus it is most likely chronic.  No  definite acute pulmonary embolism is seen.    2.  Mild bibasilar lung changes may be secondary to atelectasis or pneumonia.    XR Chest 1 View [[id number]] Collected: 04/03/23 1418     Updated: 04/03/23 1431    Narrative:      AP CHEST:    HISTORY:  Tachycardia.    COMPARISON:  02/01/2019.    FINDINGS:  Slight prominence of the interstitial markings, but no focal airspace disease.  Is there history of tobacco use or  reactive airways disease?  Otherwise, mild  viral pneumonitis could be considered. No pleural effusion or pneumothorax was  seen.    The heart is normal in size.  Mediastinal contour is within normal limits.    The visualized osseous structures appear intact.          I have reviewed the patient's current medications.   Assessment/Plan     • **Acute on chronic respiratory failure with hypoxia and hypercapnia Continue inhalers and other medicines but staying at room air and since he cant move decently due to the stroke, hence cant do walking oximetry on him.     • Multiple subsegmental pulmonary emboli without acute cor pulmonale Previously on DOAC and while there is confusion about whether he was getting aspirin or eliquis at the facility but these are being resumed and should be continued moving forward   • Hyperlipemia Continue statin   • palpitations Repeat EKG and reviewed it which doesn't show any concerning arrhythmia at this time despite RBBB   • Cigarette nicotine dependence, uncomplicated Continues to smoke which worsens his COPD and pneumonia   • Pneumonia Continue rocephin and azithromycin, with incentive spirometry.    • diabetes Use basal, prandial and correctional scale insulin while here   Stay today, work with therapy. After discussion with him and SO, will keep him here today and plan to DC tomorrow  Electronically signed by Rosalina Bhatt MD, 04/04/23, 08:59 CDT.

## 2023-04-04 NOTE — PLAN OF CARE
Goal Outcome Evaluation:              Outcome Evaluation: OT eval complete, co-eval with PT, supine in bed upon arrival. Alert x 3, pleasant and agreeable for evaluation. Patient with hx of CVA with L hemiparesis. L UE with limited AROM, presents with increased tone, notably in elbow extensors (can achieve full ROM with low load stretch); does have deminished sensation to LUE. Rolling L mod A, rolling R with modified independence. Supine to sit with mod A, sit to supine with mod A x 2 person. LE dressing with mod A, toileting with min A. Sit to stand with mod A and RW. Patient feeling faint in standing, RN entering room stating HR in 140's per telemetry, Returned to bed, all needs in reach. Patient with increased HR with standing, decreased safety in transfers, decreaed independence in ADLs, and L UE hemiparesis. Cont inpatient OT. Will benefit from rehab.

## 2023-04-05 LAB
GLUCOSE BLDC GLUCOMTR-MCNC: 340 MG/DL (ref 70–130)
QT INTERVAL: 314 MS
QT INTERVAL: 382 MS
QTC INTERVAL: 440 MS
QTC INTERVAL: 467 MS

## 2023-04-05 PROCEDURE — 25010000002 CEFTRIAXONE PER 250 MG: Performed by: HOSPITALIST

## 2023-04-05 PROCEDURE — 82962 GLUCOSE BLOOD TEST: CPT

## 2023-04-05 PROCEDURE — 94760 N-INVAS EAR/PLS OXIMETRY 1: CPT

## 2023-04-05 PROCEDURE — 97535 SELF CARE MNGMENT TRAINING: CPT

## 2023-04-05 PROCEDURE — 63710000001 INSULIN DETEMIR PER 5 UNITS: Performed by: HOSPITALIST

## 2023-04-05 PROCEDURE — 94664 DEMO&/EVAL PT USE INHALER: CPT

## 2023-04-05 PROCEDURE — G0378 HOSPITAL OBSERVATION PER HR: HCPCS

## 2023-04-05 PROCEDURE — 25010000002 AZITHROMYCIN PER 500 MG: Performed by: HOSPITALIST

## 2023-04-05 PROCEDURE — 96365 THER/PROPH/DIAG IV INF INIT: CPT

## 2023-04-05 PROCEDURE — 63710000001 INSULIN ASPART PER 5 UNITS: Performed by: HOSPITALIST

## 2023-04-05 PROCEDURE — 94799 UNLISTED PULMONARY SVC/PX: CPT

## 2023-04-05 PROCEDURE — 97530 THERAPEUTIC ACTIVITIES: CPT

## 2023-04-05 RX ORDER — METOPROLOL SUCCINATE 25 MG
12.5 TABLET, EXTENDED RELEASE 24 HR ORAL
Status: DISCONTINUED | OUTPATIENT
Start: 2023-04-05 | End: 2023-04-07 | Stop reason: HOSPADM

## 2023-04-05 RX ORDER — ALBUTEROL SULFATE 90 UG/1
2 AEROSOL, METERED RESPIRATORY (INHALATION) EVERY 4 HOURS PRN
Start: 2023-04-05

## 2023-04-05 RX ORDER — CEFDINIR 300 MG/1
300 CAPSULE ORAL 2 TIMES DAILY
Qty: 10 CAPSULE | Refills: 0
Start: 2023-04-05 | End: 2023-04-10

## 2023-04-05 RX ORDER — AZITHROMYCIN 250 MG/1
250 TABLET, FILM COATED ORAL DAILY
Qty: 3 TABLET | Refills: 0
Start: 2023-04-05 | End: 2023-04-08

## 2023-04-05 RX ORDER — ECHINACEA PURPUREA EXTRACT 125 MG
2 TABLET ORAL 4 TIMES DAILY PRN
Status: DISCONTINUED | OUTPATIENT
Start: 2023-04-05 | End: 2023-04-07 | Stop reason: HOSPADM

## 2023-04-05 RX ORDER — INSULIN GLARGINE 100 [IU]/ML
41 INJECTION, SOLUTION SUBCUTANEOUS DAILY
Refills: 5
Start: 2023-04-05

## 2023-04-05 RX ORDER — SIMVASTATIN 10 MG
10 TABLET ORAL NIGHTLY
Qty: 90 TABLET
Start: 2023-04-05

## 2023-04-05 RX ADMIN — INSULIN DETEMIR 41 UNITS: 100 INJECTION, SOLUTION SUBCUTANEOUS at 09:00

## 2023-04-05 RX ADMIN — CEFTRIAXONE SODIUM 1 G: 1 INJECTION, POWDER, FOR SOLUTION INTRAMUSCULAR; INTRAVENOUS at 17:27

## 2023-04-05 RX ADMIN — IPRATROPIUM BROMIDE AND ALBUTEROL SULFATE 3 ML: 2.5; .5 SOLUTION RESPIRATORY (INHALATION) at 07:22

## 2023-04-05 RX ADMIN — HYDROXYZINE PAMOATE 50 MG: 50 CAPSULE ORAL at 21:02

## 2023-04-05 RX ADMIN — IPRATROPIUM BROMIDE AND ALBUTEROL SULFATE 3 ML: 2.5; .5 SOLUTION RESPIRATORY (INHALATION) at 20:16

## 2023-04-05 RX ADMIN — PANCRELIPASE 12000 UNITS OF LIPASE: 60000; 12000; 38000 CAPSULE, DELAYED RELEASE PELLETS ORAL at 11:48

## 2023-04-05 RX ADMIN — INSULIN ASPART 10 UNITS: 100 INJECTION, SOLUTION INTRAVENOUS; SUBCUTANEOUS at 11:48

## 2023-04-05 RX ADMIN — AZITHROMYCIN DIHYDRATE 500 MG: 500 INJECTION, POWDER, LYOPHILIZED, FOR SOLUTION INTRAVENOUS at 17:28

## 2023-04-05 RX ADMIN — PANCRELIPASE 12000 UNITS OF LIPASE: 60000; 12000; 38000 CAPSULE, DELAYED RELEASE PELLETS ORAL at 09:00

## 2023-04-05 RX ADMIN — INSULIN ASPART 15 UNITS: 100 INJECTION, SOLUTION INTRAVENOUS; SUBCUTANEOUS at 17:30

## 2023-04-05 RX ADMIN — Medication 10 ML: at 21:02

## 2023-04-05 RX ADMIN — IPRATROPIUM BROMIDE AND ALBUTEROL SULFATE 3 ML: 2.5; .5 SOLUTION RESPIRATORY (INHALATION) at 10:38

## 2023-04-05 RX ADMIN — ASPIRIN 81 MG: 81 TABLET, CHEWABLE ORAL at 09:00

## 2023-04-05 RX ADMIN — APIXABAN 5 MG: 5 TABLET, FILM COATED ORAL at 21:02

## 2023-04-05 RX ADMIN — PANCRELIPASE 12000 UNITS OF LIPASE: 60000; 12000; 38000 CAPSULE, DELAYED RELEASE PELLETS ORAL at 17:28

## 2023-04-05 RX ADMIN — CALCIUM POLYCARBOPHIL 1250 MG: 625 TABLET, FILM COATED ORAL at 09:00

## 2023-04-05 RX ADMIN — IPRATROPIUM BROMIDE AND ALBUTEROL SULFATE 3 ML: 2.5; .5 SOLUTION RESPIRATORY (INHALATION) at 15:00

## 2023-04-05 RX ADMIN — APIXABAN 5 MG: 5 TABLET, FILM COATED ORAL at 09:00

## 2023-04-05 RX ADMIN — INSULIN ASPART 3 UNITS: 100 INJECTION, SOLUTION INTRAVENOUS; SUBCUTANEOUS at 17:30

## 2023-04-05 RX ADMIN — INSULIN ASPART 15 UNITS: 100 INJECTION, SOLUTION INTRAVENOUS; SUBCUTANEOUS at 11:47

## 2023-04-05 RX ADMIN — PANTOPRAZOLE SODIUM 40 MG: 40 TABLET, DELAYED RELEASE ORAL at 05:23

## 2023-04-05 RX ADMIN — Medication 12.5 MG: at 18:24

## 2023-04-05 RX ADMIN — ATORVASTATIN CALCIUM 10 MG: 10 TABLET, FILM COATED ORAL at 09:00

## 2023-04-05 RX ADMIN — Medication 10 ML: at 09:00

## 2023-04-05 RX ADMIN — TAMSULOSIN HYDROCHLORIDE 0.4 MG: 0.4 CAPSULE ORAL at 09:00

## 2023-04-05 NOTE — DISCHARGE PLACEMENT REQUEST
"Kilo Navarro (62 y.o. Male)     Date of Birth   1960    Social Security Number       Address   [address] [place] [address]    Home Phone   [phone]    MRN   [id number]       Mu-ism   None    Marital Status   Single                            Admission Date   4/3/23    Admission Type   Emergency    Admitting Provider   Rosalina Bhatt MD    Attending Provider   Rosalina Bhatt MD    Department, Room/Bed   [address], 382/1       Discharge Date       Discharge Disposition   Skilled Nursing Facility (DC - External)    Discharge Destination                               Attending Provider: Rosalina Bhatt MD    Allergies: No Known Allergies    Isolation: None   Infection: None   Code Status: CPR    Ht: 188 cm (74\")   Wt: 82.8 kg (182 lb 8 oz)    Admission Cmt: None   Principal Problem: Acute on chronic respiratory failure with hypoxia and hypercapnia [J96.21,J96.22]                 Active Insurance as of 4/3/2023     Primary Coverage     Payor Plan Insurance Group Employer/Plan Group    HUMANA MEDICARE REPLACEMENT HU[employer]NA MEDICARE REPLACEMENT [id number]     Payor Plan Address Payor Plan Phone Number Payor Plan Fax Number Effective Dates    [address] [address] [phone]  2/1/2023 - None Entered    [facility] [id number]       Subscriber Name Subscriber Birth Date Member ID       KILO NAVARRO 1960 [id number]                 Emergency Contacts      (Rel.) Home Phone Work Phone Mobile Phone    Keiry Dexter (Significant Other) [phone] -- [phone]    Krishan Navarro (Brother) [phone] -- [phone]    Elias Navarro (Brother) [phone] -- [phone]              "

## 2023-04-05 NOTE — SIGNIFICANT NOTE
04/05/23 1432   OTHER   Discipline physical therapy assistant   Rehab Time/Intention   Session Not Performed patient/family declined treatment

## 2023-04-05 NOTE — PROGRESS NOTES
[facility] Medicine   INPATIENT PROGRESS NOTE      Patient Name: Sandoval Navarro  Date of Admission: 4/3/2023  Today's Date: 04/05/23  Length of Stay: 0  Primary Care Physician: Guanako Atkins MD    Subjective   Chief Complaint:   HPI   Reclined in bed, significant other in room again today. He feels about the same with dyspnea and dry coughing but no sputum. No major chest tightness or palpitations   Review of Systems   Cardiovascular: Positive for chest pain.   All pertinent negatives and positives are as above. All other systems have been reviewed and are negative unless otherwise stated.     Objective    Temp:  [97.5 °F (36.4 °C)-98.7 °F (37.1 °C)] 98.7 °F (37.1 °C)  Heart Rate:  [] 100  Resp:  [16-20] 18  BP: (120-135)/(62-86) 123/67  Physical Exam  NAD, reclined in bed  PERRL, EOMI  Mucosae moist  Breathing limited  Rate controlled. Normotensive  Moving all limbs, other than weakness in left leg from stroke (he can stand up with support but movement with walker and support is still limited)  Skin intact    Results Review:  I have reviewed the labs, radiology results, and diagnostic studies.    Laboratory Data:   Results from last 7 days   Lab Units 04/04/23  0736 04/03/23  1329   WBC 10*3/mm3 11.09* 13.60*   HEMOGLOBIN g/dL 13.6 13.8   HEMATOCRIT % 41.0 42.6   PLATELETS 10*3/mm3 318 311        Results from last 7 days   Lab Units 04/04/23  0736 04/03/23  1329   SODIUM mmol/L 141 137   POTASSIUM mmol/L 4.3 4.1   CHLORIDE mmol/L 105 101   CO2 mmol/L 28.0 28.0   BUN mg/dL 13 13   CREATININE mg/dL 0.74* 0.78   CALCIUM mg/dL 9.1 9.1   BILIRUBIN mg/dL  --  0.2   ALK PHOS U/L  --  86   ALT (SGPT) U/L  --  24   AST (SGOT) U/L  --  19   GLUCOSE mg/dL 182* 229*     Culture Data:   Blood Culture   Date Value Ref Range Status   04/03/2023 No growth at 24 hours  Preliminary   04/03/2023 No growth at 24 hours  Preliminary     No results found for: URINECX  No  results found for: RESPCX  No results found for: WOUNDCX  No results found for: STOOLCX  No components found for: BODYFLD    Radiology Data:   Imaging Results (Last 24 Hours)     ** No results found for the last 24 hours. **      I have reviewed the patient's current medications.   Assessment/Plan     • **Acute on chronic respiratory failure with hypoxia and hypercapnia Continue inhalers and other medicines but staying at room air. Continue pulmonary toileting     • Multiple subsegmental pulmonary emboli without acute cor pulmonale Continue eliquis since he needs to stay on it till the chronic PE have resolved   • Hyperlipemia Continue statin   • palpitations He gets tachycardic with movement and therapy. Use low dose metoprolol to keep the heart rate under control   • BPH Continue flomax   • Pneumonia Continue rocephin and azithromycin, with incentive spirometry.    • diabetes Use basal, prandial and correctional scale insulin while here   Stay today, because the SNF facility needs a new insurance precertification to accept him. He was staying in a different SNF since the stroke and has only transferred to the current location in the past week to be closer to home  Electronically signed by Rosalina Bhatt MD, 04/05/23, 10:05 CDT.

## 2023-04-05 NOTE — PLAN OF CARE
Goal Outcome Evaluation: Pt in bed very restless, unable to rest or sleep per pt. Hydroxyzine seems ineffective. No c/o pain or discomfort voiced. Continues with IV antibiotics, tolerating well with no s/s of adverse reactions noted. VSS. No distress.

## 2023-04-05 NOTE — SIGNIFICANT NOTE
Pt HR noted at 130s via tele. Went and check on pt with pt stating he is feeling very anxious and restless. Physician notified and placed orders. Orders carried out

## 2023-04-05 NOTE — PLAN OF CARE
Goal Outcome Evaluation:  Plan of Care Reviewed With: patient           Outcome Evaluation: OT tx completed. Pt supine in bed upon arrival. VSS with HR at 98. Pt required Mod A x1 for sup to sit. Pt sat EOB with SBA. Pt completed UB dressing and bathing with Min A. LB dressing with Max A and LB bathing with Mod A. Pt completed x2 sit to stands with FWW and with Min A. Pt stood for 30 seconds before reporting he was losing his balance. OTR guided pts body back to sitting EOB. On second standing attempt, pt was able to take x1 sidestep up towards the HOB. Pt returned to sup with Min A. Pts HR during mobility increased x1 to 130 with return to 100 after one minute rest. Pt was left supine in bed with VSS, exit alarm on, and all needs in reach. Cont OT POC. No goals met. Anticipate return to SNF for further rehab.

## 2023-04-05 NOTE — THERAPY TREATMENT NOTE
Patient Name: Sandoval Navarro  : 1960    MRN: [id number]                              Today's Date: 2023       Admit Date: 4/3/2023    Visit Dx:     ICD-10-CM ICD-9-CM   1. Multiple subsegmental pulmonary emboli without acute cor pulmonale  I26.94 415.19   2. Impaired mobility and ADLs  Z74.09 V49.89    Z78.9    3. Impaired functional mobility, balance, gait, and endurance  Z74.09 V49.89     Patient Active Problem List   Diagnosis   • Leukocytosis   • Chest pain   • Dyspnea on exertion   • Chronic fatigue   • PVD (peripheral vascular disease) with claudication   • Cigarette nicotine dependence, uncomplicated   • Overweight (BMI 25.0-29.9)   • Chronic obstructive pulmonary disease   • Carotid stenosis, asymptomatic, right   • ALICE (obstructive sleep apnea)   • Hyperlipemia   • Multiple subsegmental pulmonary emboli without acute cor pulmonale   • Acute on chronic respiratory failure with hypoxia and hypercapnia     Past Medical History:   Diagnosis Date   • Diabetes mellitus    • Gout    • Hyperlipemia      Past Surgical History:   Procedure Laterality Date   • BACK SURGERY     • CHOLECYSTECTOMY     • COLONOSCOPY N/A 3/14/2017    Procedure: COLONOSCOPY;  Surgeon: Cachorro Perez DO;  Location: [facility] ENDOSCOPY;  Service:    • HAND SURGERY     • LEG SURGERY Right    • NOSE SURGERY     • PANCREAS SURGERY     • SPLENECTOMY        General Information     Row Name 2346          OT Time and Intention    Document Type therapy note (daily note)  -CM     Mode of Treatment individual therapy;occupational therapy  -CM     Row Name 23 0846          General Information    Patient Profile Reviewed yes  -CM     Existing Precautions/Restrictions fall  -CM     Row Name 23 0846          Cognition    Orientation Status (Cognition) oriented x 4  -CM     Row Name 23 0846          Safety Issues, Functional Mobility    Impairments Affecting Function (Mobility)  balance;coordination;endurance/activity tolerance;grasp;motor control;motor planning;muscle tone abnormal;pain;strength;sensation/sensory awareness;range of motion (ROM)  -CM           User Key  (r) = Recorded By, (t) = Taken By, (c) = Cosigned By    Initials Name Provider Type    Belkis Salmeron OT Occupational Therapist                 Mobility/ADL's     Row Name 04/05/23 0846          Bed Mobility    Bed Mobility supine-sit;sit-supine  -CM     Supine-Sit [place] (Bed Mobility) moderate assist (50% patient effort)  -CM     Sit-Supine [place] (Bed Mobility) minimum assist (75% patient effort)  -CM     Assistive Device (Bed Mobility) bed rails;head of bed elevated  -CM     Row Name 04/05/23 0846          Transfers    Transfers sit-stand transfer;stand-sit transfer  -CM     Row Name 04/05/23 0846          Sit-Stand Transfer    Sit-Stand [place] (Transfers) minimum assist (75% patient effort)  -CM     Assistive Device (Sit-Stand Transfers) walker, front-wheeled  -CM     Row Name 04/05/23 0846          Stand-Sit Transfer    Stand-Sit [place] (Transfers) minimum assist (75% patient effort)  -CM     Assistive Device (Stand-Sit Transfers) walker, front-wheeled  -CM     Row Name 04/05/23 0846          Activities of Daily Living    BADL Assessment/Intervention lower body dressing;upper body dressing;bathing;grooming  -CM     Row Name 04/05/23 0846          Upper Body Dressing Assessment/Training    [place] Level (Upper Body Dressing) upper body dressing skills;doff;don;other (see comments);minimum assist (75% patient effort)  HG  -CM     Position (Upper Body Dressing) edge of bed sitting  -CM     Row Name 04/05/23 0846          Lower Body Dressing Assessment/Training    [place] Level (Lower Body Dressing) doff;don;socks;maximum assist (25% patient effort)  -CM     Position (Lower Body Dressing) edge of bed sitting  -CM     Row Name 04/05/23 0846          Bathing Assessment/Intervention     [place] Level (Bathing) bathing skills;upper body;minimum assist (75% patient effort);lower body;moderate assist (50% patient effort)  -CM     Position (Bathing) edge of bed sitting  -CM     Row Name 04/05/23 0846          Grooming Assessment/Training    [place] Level (Grooming) grooming skills;hair care, combing/brushing;oral care regimen;wash face, hands;set up  -CM     Position (Grooming) edge of bed sitting  -CM           User Key  (r) = Recorded By, (t) = Taken By, (c) = Cosigned By    Initials Name Provider Type    Belkis Salmeron OT Occupational Therapist               Obj/Interventions    No documentation.                Goals/Plan     Row Name 04/05/23 0846          Transfer Goal 1 (OT)    Activity/Assistive Device (Transfer Goal 1, OT) toilet;shower chair  -CM     [place] Level/Cues Needed (Transfer Goal 1, OT) contact guard required  -CM     Time Frame (Transfer Goal 1, OT) long term goal (LTG);by discharge  -CM     Progress/Outcome (Transfer Goal 1, OT) goal not met  -CM     Row Name 04/05/23 0846          Bathing Goal 1 (OT)    Activity/Device (Bathing Goal 1, OT) lower body bathing  -CM     [place] Level/Cues Needed (Bathing Goal 1, OT) contact guard required  -CM     Time Frame (Bathing Goal 1, OT) long term goal (LTG);by discharge  -CM     Progress/Outcomes (Bathing Goal 1, OT) goal not met  -CM     Row Name 04/05/23 0846          Dressing Goal 1 (OT)    Activity/Device (Dressing Goal 1, OT) lower body dressing  -CM     [place]/Cues Needed (Dressing Goal 1, OT) contact guard required  -CM     Time Frame (Dressing Goal 1, OT) long term goal (LTG);by discharge  -CM     Progress/Outcome (Dressing Goal 1, OT) goal not met  -CM     Row Name 04/05/23 0846          Toileting Goal 1 (OT)    Activity/Device (Toileting Goal 1, OT) toileting skills, all  -CM     [place] Level/Cues Needed (Toileting Goal 1, OT) contact guard required  -CM     Time Frame (Toileting Goal 1, OT) long  term goal (LTG);by discharge  -CM     Progress/Outcome (Toileting Goal 1, OT) goal not met  -CM           User Key  (r) = Recorded By, (t) = Taken By, (c) = Cosigned By    Initials Name Provider Type    CM Belkis Regalado, OT Occupational Therapist               Clinical Impression     Row Name 04/05/23 0846          Pain Assessment    Pretreatment Pain Rating 0/10 - no pain  -CM     Posttreatment Pain Rating 0/10 - no pain  -CM     Row Name 04/05/23 0846          Plan of Care Review    Plan of Care Reviewed With patient  -CM     Outcome Evaluation OT tx completed. Pt supine in bed upon arrival. VSS with HR at 98. Pt required Mod A x1 for sup to sit. Pt sat EOB with SBA. Pt completed UB dressing and bathing with Min A. LB dressing with Max A and LB bathing with Mod A. Pt completed x2 sit to stands with FWW and with Min A. Pt stood for 30 seconds before reporting he was losing his balance. OTR guided pts body back to sitting EOB. On second standing attempt, pt was able to take x1 sidestep up towards the HOB. Pt returned to sup with Min A. Pts HR during mobility increased x1 to 130 with return to 100 after one minute rest. Pt was left supine in bed with VSS, exit alarm on, and all needs in reach. Cont OT POC. No goals met. Anticipate return to SNF for further rehab.  -CM     Row Name 04/05/23 0886          Therapy Assessment/Plan (OT)    Rehab Potential (OT) good, to achieve stated therapy goals  -CM     Criteria for Skilled Therapeutic Interventions Met (OT) yes;skilled treatment is necessary  -CM     Therapy Frequency (OT) other (see comments)  5-7 d/wk  -CM     Row Name 04/05/23 0821          Vital Signs    Pre Systolic BP Rehab 123  -CM     Pre Treatment Diastolic BP 67  -CM     Pretreatment Heart Rate (beats/min) 98  -CM     Intratreatment Heart Rate (beats/min) 130  -CM     Posttreatment Heart Rate (beats/min) 100  -CM     Pre SpO2 (%) 98  -CM     O2 Delivery Pre Treatment room air  -CM     Intra SpO2 (%) 94  -CM      O2 Delivery Intra Treatment room air  -CM     Post SpO2 (%) 98  -CM     O2 Delivery Post Treatment room air  -CM     Pre Patient Position Supine  -CM     Intra Patient Position Standing  -CM     Post Patient Position Supine  -CM     Row Name 04/05/23 0846          Positioning and Restraints    Pre-Treatment Position in bed  -CM     Post Treatment Position bed  -CM     In Bed supine;call light within reach;encouraged to call for assist;exit alarm on;side rails up x2  -CM           User Key  (r) = Recorded By, (t) = Taken By, (c) = Cosigned By    Initials Name Provider Type    Belkis Salmeron OT Occupational Therapist               Outcome Measures     Row Name 04/05/23 0846          How much help from another is currently needed...    Putting on and taking off regular lower body clothing? 2  -CM     Bathing (including washing, rinsing, and drying) 2  -CM     Toileting (which includes using toilet bed pan or urinal) 3  -CM     Putting on and taking off regular upper body clothing 3  -CM     Taking care of personal grooming (such as brushing teeth) 3  -CM     Eating meals 4  -CM     AM-PAC 6 Clicks Score (OT) 17  -CM     Row Name 04/05/23 0846          Functional Assessment    Outcome Measure Options AM-PAC 6 Clicks Daily Activity (OT)  -CM           User Key  (r) = Recorded By, (t) = Taken By, (c) = Cosigned By    Initials Name Provider Type    Belkis Salmeron OT Occupational Therapist                Occupational Therapy Education     Title: PT OT SLP Therapies (In Progress)     Topic: Occupational Therapy (In Progress)     Point: ADL training (Done)     Description:   Instruct learner(s) on proper safety adaptation and remediation techniques during self care or transfers.   Instruct in proper use of assistive devices.              Learning Progress Summary           Patient Acceptance, E,TB, VU,NR by  at 4/4/2023 1421    Comment: POC, role of OT, transfer training                   Point: Home exercise  program (Not Started)     Description:   Instruct learner(s) on appropriate technique for monitoring, assisting and/or progressing therapeutic exercises/activities.              Learner Progress:  Not documented in this visit.          Point: Precautions (Not Started)     Description:   Instruct learner(s) on prescribed precautions during self-care and functional transfers.              Learner Progress:  Not documented in this visit.          Point: Body mechanics (Not Started)     Description:   Instruct learner(s) on proper positioning and spine alignment during self-care, functional mobility activities and/or exercises.              Learner Progress:  Not documented in this visit.                      User Key     Initials Effective Dates Name Provider Type Discipline     06/14/21 -  Alan Holder OT Occupational Therapist OT              OT Recommendation and Plan  Therapy Frequency (OT): other (see comments) (5-7 d/wk)  Plan of Care Review  Plan of Care Reviewed With: patient  Outcome Evaluation: OT tx completed. Pt supine in bed upon arrival. VSS with HR at 98. Pt required Mod A x1 for sup to sit. Pt sat EOB with SBA. Pt completed UB dressing and bathing with Min A. LB dressing with Max A and LB bathing with Mod A. Pt completed x2 sit to stands with FWW and with Min A. Pt stood for 30 seconds before reporting he was losing his balance. OTR guided pts body back to sitting EOB. On second standing attempt, pt was able to take x1 sidestep up towards the HOB. Pt returned to sup with Min A. Pts HR during mobility increased x1 to 130 with return to 100 after one minute rest. Pt was left supine in bed with VSS, exit alarm on, and all needs in reach. Cont OT POC. No goals met. Anticipate return to SNF for further rehab.     Time Calculation:    Time Calculation- OT     Row Name 04/05/23 0932             Time Calculation- OT    OT Start Time 0846  -CM      OT Stop Time 0926  -CM      OT Time Calculation (min) 40  min  -CM      OT Received On 04/05/23  -CM         Timed Charges    36669 - OT Therapeutic Activity Minutes 10  -CM      26153 - OT Self Care/Mgmt Minutes 30  -CM         Total Minutes    Timed Charges Total Minutes 40  -CM       Total Minutes 40  -CM            User Key  (r) = Recorded By, (t) = Taken By, (c) = Cosigned By    Initials Name Provider Type    Belkis Salmeron OT Occupational Therapist              Therapy Charges for Today     Code Description Service Date Service Provider Modifiers Qty    33482531178 HC OT SELF CARE/MGMT/TRAIN EA 15 MIN 4/5/2023 Belkis Regalado OT GO 2    69792239607 HC OT THERAPEUTIC ACT EA 15 MIN 4/5/2023 Belkis Regalado OT GO 1               Belkis Regalado OT  4/5/2023

## 2023-04-06 ENCOUNTER — APPOINTMENT (OUTPATIENT)
Dept: CT IMAGING | Facility: HOSPITAL | Age: 63
End: 2023-04-06
Payer: MEDICARE

## 2023-04-06 LAB
APTT PPP: 20.1 SECONDS (ref 20–40.3)
ATMOSPHERIC PRESS: 757 MMHG
BASE EXCESS BLDV CALC-SCNC: 3.8 MMOL/L (ref 0–2)
BDY SITE: ABNORMAL
COHGB MFR BLD: 3.4 % (ref 0–5)
GLUCOSE BLDC GLUCOMTR-MCNC: 182 MG/DL (ref 70–130)
GLUCOSE BLDC GLUCOMTR-MCNC: 234 MG/DL (ref 70–130)
GLUCOSE BLDC GLUCOMTR-MCNC: 91 MG/DL (ref 70–130)
HCO3 BLDV-SCNC: 28.3 MMOL/L (ref 18–23)
HGB BLDA-MCNC: 12.8 G/DL (ref 14–18)
INR PPP: 1.13 (ref 0.8–1.2)
METHGB BLD QL: 0.9 % (ref 0–3)
MODALITY: ABNORMAL
NOTE: ABNORMAL
OXYHGB MFR BLDV: 91.1 % (ref 45–75)
PCO2 BLDV: 41.8 MM HG (ref 41–51)
PH BLDV: 7.44 PH UNITS (ref 7.29–7.37)
PO2 BLDV: 68.5 MM HG (ref 27–53)
POTASSIUM BLDV-SCNC: 3.8 MMOL/L (ref 3.5–5)
PROTHROMBIN TIME: 14.5 SECONDS (ref 11.1–15.3)
SAO2 % BLDCOV: 95.1 % (ref 45–75)
SODIUM BLDV-SCNC: 139 MMOL/L (ref 136–146)
VENTILATOR MODE: ABNORMAL

## 2023-04-06 PROCEDURE — 97530 THERAPEUTIC ACTIVITIES: CPT

## 2023-04-06 PROCEDURE — 85730 THROMBOPLASTIN TIME PARTIAL: CPT | Performed by: INTERNAL MEDICINE

## 2023-04-06 PROCEDURE — 86901 BLOOD TYPING SEROLOGIC RH(D): CPT | Performed by: INTERNAL MEDICINE

## 2023-04-06 PROCEDURE — G0378 HOSPITAL OBSERVATION PER HR: HCPCS

## 2023-04-06 PROCEDURE — 25010000002 CEFTRIAXONE PER 250 MG: Performed by: HOSPITALIST

## 2023-04-06 PROCEDURE — 63710000001 INSULIN DETEMIR PER 5 UNITS: Performed by: HOSPITALIST

## 2023-04-06 PROCEDURE — 94760 N-INVAS EAR/PLS OXIMETRY 1: CPT

## 2023-04-06 PROCEDURE — 86850 RBC ANTIBODY SCREEN: CPT | Performed by: INTERNAL MEDICINE

## 2023-04-06 PROCEDURE — 63710000001 INSULIN ASPART PER 5 UNITS: Performed by: HOSPITALIST

## 2023-04-06 PROCEDURE — 86900 BLOOD TYPING SEROLOGIC ABO: CPT | Performed by: INTERNAL MEDICINE

## 2023-04-06 PROCEDURE — 94664 DEMO&/EVAL PT USE INHALER: CPT

## 2023-04-06 PROCEDURE — 74177 CT ABD & PELVIS W/CONTRAST: CPT

## 2023-04-06 PROCEDURE — 85610 PROTHROMBIN TIME: CPT | Performed by: INTERNAL MEDICINE

## 2023-04-06 PROCEDURE — 97110 THERAPEUTIC EXERCISES: CPT

## 2023-04-06 PROCEDURE — 82805 BLOOD GASES W/O2 SATURATION: CPT

## 2023-04-06 PROCEDURE — 94799 UNLISTED PULMONARY SVC/PX: CPT

## 2023-04-06 PROCEDURE — 25510000001 IOPAMIDOL 61 % SOLUTION: Performed by: HOSPITALIST

## 2023-04-06 PROCEDURE — 96375 TX/PRO/DX INJ NEW DRUG ADDON: CPT

## 2023-04-06 PROCEDURE — 82962 GLUCOSE BLOOD TEST: CPT

## 2023-04-06 PROCEDURE — 87205 SMEAR GRAM STAIN: CPT | Performed by: HOSPITALIST

## 2023-04-06 PROCEDURE — 96361 HYDRATE IV INFUSION ADD-ON: CPT

## 2023-04-06 PROCEDURE — 97535 SELF CARE MNGMENT TRAINING: CPT

## 2023-04-06 PROCEDURE — 70450 CT HEAD/BRAIN W/O DYE: CPT

## 2023-04-06 PROCEDURE — 96367 TX/PROPH/DG ADDL SEQ IV INF: CPT

## 2023-04-06 PROCEDURE — 87070 CULTURE OTHR SPECIMN AEROBIC: CPT | Performed by: HOSPITALIST

## 2023-04-06 PROCEDURE — 82820 HEMOGLOBIN-OXYGEN AFFINITY: CPT

## 2023-04-06 RX ORDER — PANTOPRAZOLE SODIUM 40 MG/10ML
80 INJECTION, POWDER, LYOPHILIZED, FOR SOLUTION INTRAVENOUS ONCE
Status: COMPLETED | OUTPATIENT
Start: 2023-04-06 | End: 2023-04-06

## 2023-04-06 RX ORDER — PANTOPRAZOLE SODIUM 40 MG/10ML
40 INJECTION, POWDER, LYOPHILIZED, FOR SOLUTION INTRAVENOUS EVERY 12 HOURS SCHEDULED
Status: DISCONTINUED | OUTPATIENT
Start: 2023-04-07 | End: 2023-04-07 | Stop reason: HOSPADM

## 2023-04-06 RX ORDER — SODIUM CHLORIDE 9 MG/ML
200 INJECTION, SOLUTION INTRAVENOUS CONTINUOUS
Status: DISCONTINUED | OUTPATIENT
Start: 2023-04-06 | End: 2023-04-07 | Stop reason: HOSPADM

## 2023-04-06 RX ORDER — SODIUM CHLORIDE, SODIUM LACTATE, POTASSIUM CHLORIDE, CALCIUM CHLORIDE 600; 310; 30; 20 MG/100ML; MG/100ML; MG/100ML; MG/100ML
100 INJECTION, SOLUTION INTRAVENOUS CONTINUOUS
Status: DISCONTINUED | OUTPATIENT
Start: 2023-04-06 | End: 2023-04-06

## 2023-04-06 RX ORDER — TRIAMCINOLONE ACETONIDE 1 MG/G
1 CREAM TOPICAL EVERY 12 HOURS SCHEDULED
Status: DISCONTINUED | OUTPATIENT
Start: 2023-04-06 | End: 2023-04-07 | Stop reason: HOSPADM

## 2023-04-06 RX ORDER — PANTOPRAZOLE SODIUM 40 MG/10ML
80 INJECTION, POWDER, LYOPHILIZED, FOR SOLUTION INTRAVENOUS EVERY 12 HOURS SCHEDULED
Status: DISCONTINUED | OUTPATIENT
Start: 2023-04-06 | End: 2023-04-06 | Stop reason: DRUGHIGH

## 2023-04-06 RX ORDER — LANOLIN ALCOHOL/MO/W.PET/CERES
6 CREAM (GRAM) TOPICAL NIGHTLY
Status: DISCONTINUED | OUTPATIENT
Start: 2023-04-06 | End: 2023-04-07 | Stop reason: HOSPADM

## 2023-04-06 RX ORDER — SODIUM CHLORIDE 9 MG/ML
INJECTION, SOLUTION INTRAVENOUS
Status: COMPLETED
Start: 2023-04-06 | End: 2023-04-06

## 2023-04-06 RX ADMIN — IPRATROPIUM BROMIDE AND ALBUTEROL SULFATE 3 ML: 2.5; .5 SOLUTION RESPIRATORY (INHALATION) at 08:37

## 2023-04-06 RX ADMIN — TAMSULOSIN HYDROCHLORIDE 0.4 MG: 0.4 CAPSULE ORAL at 08:27

## 2023-04-06 RX ADMIN — SODIUM CHLORIDE 1000 ML: 9 INJECTION, SOLUTION INTRAVENOUS at 21:45

## 2023-04-06 RX ADMIN — Medication 2 SPRAY: at 08:27

## 2023-04-06 RX ADMIN — PANTOPRAZOLE SODIUM 80 MG: 40 INJECTION, POWDER, FOR SOLUTION INTRAVENOUS at 23:10

## 2023-04-06 RX ADMIN — IOPAMIDOL 90 ML: 612 INJECTION, SOLUTION INTRAVENOUS at 22:42

## 2023-04-06 RX ADMIN — CEFTRIAXONE SODIUM 1 G: 1 INJECTION, POWDER, FOR SOLUTION INTRAMUSCULAR; INTRAVENOUS at 17:39

## 2023-04-06 RX ADMIN — PANTOPRAZOLE SODIUM 40 MG: 40 TABLET, DELAYED RELEASE ORAL at 05:01

## 2023-04-06 RX ADMIN — APIXABAN 5 MG: 5 TABLET, FILM COATED ORAL at 20:15

## 2023-04-06 RX ADMIN — SODIUM CHLORIDE 100 ML/HR: 9 INJECTION, SOLUTION INTRAVENOUS at 23:13

## 2023-04-06 RX ADMIN — INSULIN ASPART 15 UNITS: 100 INJECTION, SOLUTION INTRAVENOUS; SUBCUTANEOUS at 11:11

## 2023-04-06 RX ADMIN — IPRATROPIUM BROMIDE AND ALBUTEROL SULFATE 3 ML: 2.5; .5 SOLUTION RESPIRATORY (INHALATION) at 19:37

## 2023-04-06 RX ADMIN — PANCRELIPASE 12000 UNITS OF LIPASE: 60000; 12000; 38000 CAPSULE, DELAYED RELEASE PELLETS ORAL at 11:10

## 2023-04-06 RX ADMIN — INSULIN DETEMIR 41 UNITS: 100 INJECTION, SOLUTION SUBCUTANEOUS at 08:27

## 2023-04-06 RX ADMIN — INSULIN ASPART 5 UNITS: 100 INJECTION, SOLUTION INTRAVENOUS; SUBCUTANEOUS at 11:11

## 2023-04-06 RX ADMIN — SODIUM CHLORIDE 1000 ML: 9 INJECTION, SOLUTION INTRAVENOUS at 23:35

## 2023-04-06 RX ADMIN — INSULIN ASPART 5 UNITS: 100 INJECTION, SOLUTION INTRAVENOUS; SUBCUTANEOUS at 08:26

## 2023-04-06 RX ADMIN — IPRATROPIUM BROMIDE AND ALBUTEROL SULFATE 3 ML: 2.5; .5 SOLUTION RESPIRATORY (INHALATION) at 11:25

## 2023-04-06 RX ADMIN — PANCRELIPASE 12000 UNITS OF LIPASE: 60000; 12000; 38000 CAPSULE, DELAYED RELEASE PELLETS ORAL at 08:27

## 2023-04-06 RX ADMIN — INSULIN ASPART 15 UNITS: 100 INJECTION, SOLUTION INTRAVENOUS; SUBCUTANEOUS at 17:48

## 2023-04-06 RX ADMIN — APIXABAN 5 MG: 5 TABLET, FILM COATED ORAL at 08:27

## 2023-04-06 RX ADMIN — ATORVASTATIN CALCIUM 10 MG: 10 TABLET, FILM COATED ORAL at 08:27

## 2023-04-06 RX ADMIN — Medication 10 ML: at 20:15

## 2023-04-06 RX ADMIN — MELATONIN 6 MG: 3 TAB ORAL at 20:15

## 2023-04-06 RX ADMIN — IPRATROPIUM BROMIDE AND ALBUTEROL SULFATE 3 ML: 2.5; .5 SOLUTION RESPIRATORY (INHALATION) at 14:29

## 2023-04-06 RX ADMIN — CALCIUM POLYCARBOPHIL 1250 MG: 625 TABLET, FILM COATED ORAL at 08:27

## 2023-04-06 RX ADMIN — PANCRELIPASE 12000 UNITS OF LIPASE: 60000; 12000; 38000 CAPSULE, DELAYED RELEASE PELLETS ORAL at 17:39

## 2023-04-06 RX ADMIN — Medication 12.5 MG: at 08:27

## 2023-04-06 RX ADMIN — Medication 10 ML: at 08:27

## 2023-04-06 RX ADMIN — ASPIRIN 81 MG: 81 TABLET, CHEWABLE ORAL at 08:27

## 2023-04-06 RX ADMIN — TRIAMCINOLONE ACETONIDE 1 APPLICATION: 1 CREAM TOPICAL at 21:23

## 2023-04-06 NOTE — PLAN OF CARE
Goal Outcome Evaluation:  Plan of Care Reviewed With: patient           Outcome Evaluation: sup-sit-sup min of 1,sit-stand-sit min of 1-while in standing worked on equal wt bearing lore le's,stepping forward/backward with left le then r le-pt holding to rw for this activity-will bring bryanna walker next rx

## 2023-04-06 NOTE — PLAN OF CARE
Goal Outcome Evaluation:  Plan of Care Reviewed With: patient           Outcome Evaluation: OT tx complete, Rolling R with modified independence, supine to sit with min A. UE bath with min A, LE bath with mod A, toileting with min A. UE dressing with min A, LE dressing with mod A (socks and pants). Sit to stand with min A, bed to chair t/f with min A, stand pivot t/f to R side. LUE stretching performed, 2 sets x 10 reps, presenting with flexor tone this date. -110 bpm throughout session. Washcloth placed in L Hand, pillows placed for tone management and for stretching to LUE in recliner. Patient denies pain. Up in chair, all needs in reach. Cont OT POC.

## 2023-04-06 NOTE — PROGRESS NOTES
[facility] Medicine Services  INPATIENT PROGRESS NOTE    Length of Stay: 0  Date of Admission: 4/3/2023  Primary Care Physician: Guanako Atkins MD    Subjective   Chief Complaint: Fall  HPI:    Patient fell in his room and hit his head on the side of the bed.  He is doing fine afterwards.  No loss of consciousness.  Reports his breathing has been stable.    Review of Systems   Respiratory: Positive for shortness of breath.    Cardiovascular: Negative for chest pain.        All pertinent negatives and positives are as above. All other systems have been reviewed and are negative unless otherwise stated.     Objective    Temp:  [97.4 °F (36.3 °C)-98.4 °F (36.9 °C)] 97.7 °F (36.5 °C)  Heart Rate:  [] 92  Resp:  [16-18] 18  BP: (100-126)/(56-76) 100/59  Physical Exam  Vitals and nursing note reviewed.   Constitutional:       General: He is not in acute distress.     Appearance: He is well-developed. He is not diaphoretic.   HENT:      Head: Normocephalic and atraumatic.   Cardiovascular:      Rate and Rhythm: Normal rate.   Pulmonary:      Effort: Pulmonary effort is normal. No respiratory distress.      Breath sounds: No wheezing.   Abdominal:      General: There is no distension.      Palpations: Abdomen is soft.   Musculoskeletal:         General: Normal range of motion.   Skin:     General: Skin is warm and dry.   Neurological:      Mental Status: He is alert.      Cranial Nerves: No cranial nerve deficit.   Psychiatric:         Behavior: Behavior normal.         Thought Content: Thought content normal.         Judgment: Judgment normal.             Results Review:  I have reviewed the labs, radiology results, and diagnostic studies.    Laboratory Data:   Lab Results (last 24 hours)     Procedure Component Value Units Date/Time    POC Glucose Once [[id number]]  (Abnormal) Collected: 04/06/23 1025    Specimen: Blood Updated: 04/06/23 1104     Glucose 234 mg/dL       Comment: RN NotifiedOperator: [id number] DANG RHAYONAMeter ID: [id number]       Blood Culture - Blood, Arm, Left [[id number]]  (Normal) Collected: 04/03/23 1827    Specimen: Blood from Arm, Left Updated: 04/05/23 1846     Blood Culture No growth at 2 days    Blood Culture - Blood, Hand, Right [[id number]]  (Normal) Collected: 04/03/23 1827    Specimen: Blood from Hand, Right Updated: 04/05/23 1846     Blood Culture No growth at 2 days          Culture Data:   Blood Culture   Date Value Ref Range Status   04/03/2023 No growth at 2 days  Preliminary   04/03/2023 No growth at 2 days  Preliminary     No results found for: URINECX  No results found for: RESPCX  No results found for: WOUNDCX  No results found for: STOOLCX  No components found for: BODYFLD    Radiology Data:   Imaging Results (Last 24 Hours)     Procedure Component Value Units Date/Time    CT Head Without Contrast [[id number]] Collected: 04/06/23 1210     Updated: 04/06/23 1241    Narrative:      HISTORY:  Fall.    COMPARISON:  None.    TECHNIQUE:  Routine helical imaging through the brain with axial, coronal and sagittal  two-dimensional reformatted images.    FINDINGS:  Vague area of decreased attenuation at the right frontoparietal convexity  measuring up to about 4.6 cm as seen on image 39 of series 2.  There is also  extensive chronic microvascular ischemic change with areas of old infarct  involving the right parieto-occipital lobe and left frontal lobe.  No midline  shift or mass effect.  No acute hemorrhage.  There is mild prominence of the  lateral ventricles, presumably on the basis of central atrophy.    No calvarial fracture is seen.      Impression:      1. Vague, ovoid area of ill-defined low-attenuation near the right  frontoparietal convexity.  Subacute infarct or underlying mass/edema could be  considered.  Follow-up brain MRI without and with gadolinium would be most  useful.    2.  Extensive chronic ischemic change with large areas of  encephalomalacia in  the left frontal and right parietooccipital lobes.          I have reviewed the patient's current medications.     Assessment/Plan     Active Hospital Problems    Diagnosis    • **Acute on chronic respiratory failure with hypoxia and hypercapnia    • Multiple subsegmental pulmonary emboli without acute cor pulmonale    • Hyperlipemia    • Chronic obstructive pulmonary disease    • Cigarette nicotine dependence, uncomplicated    • Dyspnea on exertion    • Chronic fatigue      Fall- CT of the head has been ordered and is pending    Acute on chronic respiratory failure with hypoxia and hypercapnia-continue with monitoring has been on room air we will continue to monitor    Pulmonary embolism-continue with Eliquis, will monitor    Hyperlipidemia-has been on statin therapy    Palpitations-continue with supportive care and has been on metoprolol    BPH-Flomax    Pneumonia-continue Rocephin and azithromycin    Diabetes mellitus-continue with insulin regimen      Medical Decision Making  Number and Complexity of problems: 3 major complex  Differential Diagnosis: CHF exacerbation    Conditions and Status:        Condition is improving.     [facility] Data  External documents reviewed: Previous medical records  My EKG interpretation: None  My CT interpretation: CT head pending  Tests considered but not ordered: None     Decision rules/scores evaluated (example [id number], Wells, etc): None     Discussed with: None     Treatment Plan  As above    Care Planning  Shared decision making: Patient  Code status and discussions: Full code    Disposition  Social Determinants of Health that impact treatment or disposition: None  I expect the patient to be discharged to nursing rehab in 1-2 days.      I have utilized all available immediate resources to obtain, update, or review the patient's current medications (including all prescriptions, over-the-counter products, herbals, cannabis/cannabidiol products, and  vitamin/mineral/dietary (nutritional) supplements).     I confirmed that the patient's Advance Care Plan is present, code status is documented, or surrogate decision maker is listed in the patient's medical record.       Norris Velarde MD   04/06/23   13:51 CDT

## 2023-04-06 NOTE — PLAN OF CARE
Goal Outcome Evaluation: Pt in bed, continues to be very restless this shift, atarax given per order and ineffective. No c/o pain or discomfort. No acute changes. VSS. No distress.

## 2023-04-06 NOTE — SIGNIFICANT NOTE
Patient chair alarm sounding, this RN responded. Patient found in floor, in a position as if he slid out of the chair. Patient stated he fell out of the chair and he didn't know where he was going. No open wounds noted. Patient stated he hit his head on the arm rail of the bed. Dr. Velarde notified and ordered stat head CT.

## 2023-04-06 NOTE — THERAPY TREATMENT NOTE
Patient Name: Sandoval Navarro  : 1960    MRN: [id number]                              Today's Date: 2023       Admit Date: 4/3/2023    Visit Dx:     ICD-10-CM ICD-9-CM   1. Multiple subsegmental pulmonary emboli without acute cor pulmonale  I26.94 415.19   2. Impaired mobility and ADLs  Z74.09 V49.89    Z78.9    3. Impaired functional mobility, balance, gait, and endurance  Z74.09 V49.89     Patient Active Problem List   Diagnosis   • Leukocytosis   • Chest pain   • Dyspnea on exertion   • Chronic fatigue   • PVD (peripheral vascular disease) with claudication   • Cigarette nicotine dependence, uncomplicated   • Overweight (BMI 25.0-29.9)   • Chronic obstructive pulmonary disease   • Carotid stenosis, asymptomatic, right   • ALICE (obstructive sleep apnea)   • Hyperlipemia   • Multiple subsegmental pulmonary emboli without acute cor pulmonale   • Acute on chronic respiratory failure with hypoxia and hypercapnia     Past Medical History:   Diagnosis Date   • Diabetes mellitus    • Gout    • Hyperlipemia      Past Surgical History:   Procedure Laterality Date   • BACK SURGERY     • CHOLECYSTECTOMY     • COLONOSCOPY N/A 3/14/2017    Procedure: COLONOSCOPY;  Surgeon: Cachorro Perez DO;  Location: [facility] ENDOSCOPY;  Service:    • HAND SURGERY     • LEG SURGERY Right    • NOSE SURGERY     • PANCREAS SURGERY     • SPLENECTOMY        General Information     Row Name 23 0845          OT Time and Intention    Document Type therapy note (daily note)  -     Mode of Treatment individual therapy;occupational therapy  -     Row Name 23 0845          Cognition    Orientation Status (Cognition) oriented x 4  -SJ     Row Name 23 0845          Safety Issues, Functional Mobility    Safety Issues Affecting Function (Mobility) safety precaution awareness;friction/shear risk  -     Impairments Affecting Function (Mobility) balance;coordination;endurance/activity tolerance;grasp;motor control;motor  planning;muscle tone abnormal;pain;strength;sensation/sensory awareness;range of motion (ROM)  -           User Key  (r) = Recorded By, (t) = Taken By, (c) = Cosigned By    Initials Name Provider Type    Alan Velazquez, OT Occupational Therapist                 Mobility/ADL's     Row Name 04/06/23 0845          Bed Mobility    Bed Mobility rolling right;supine-sit  -     Rolling Right Isabela (Bed Mobility) modified independence  -     Supine-Sit Isabela (Bed Mobility) minimum assist (75% patient effort)  -     Assistive Device (Bed Mobility) bed rails;head of bed elevated  -     Row Name 04/06/23 0845          Transfers    Transfers sit-stand transfer;bed-chair transfer  -[facility] Name 04/06/23 0845          Bed-Chair Transfer    Bed-Chair Isabela (Transfers) minimum assist (75% patient effort)  -     Comment, (Bed-Chair Transfer) stand pivot t/f to R  -SJ     Row Name 04/06/23 08          Sit-Stand Transfer    Sit-Stand Isabela (Transfers) minimum assist (75% patient effort)  -[facility] Name 04/06/23 08          Stand-Sit Transfer    Stand-Sit Isabela (Transfers) minimum assist (75% patient effort)  -[facility] Name 04/06/23 0845          Activities of Daily Living    BADL Assessment/Intervention bathing;upper body dressing;lower body dressing  -[facility] Name 04/06/23 0845          Upper Body Dressing Assessment/Training    Isabela Level (Upper Body Dressing) doff;don;minimum assist (75% patient effort)  -[facility] Name 04/06/23 0845          Toileting Assessment/Training    Isabela Level (Toileting) minimum assist (75% patient effort)  -[facility] Name 04/06/23 08          Lower Body Dressing Assessment/Training    Isabela Level (Lower Body Dressing) doff;don;socks;pants/bottoms;moderate assist (50% patient effort)  -[facility] Name 04/06/23 0845          Bathing Assessment/Intervention    Isabela Level (Bathing) bathing skills;upper  body;minimum assist (75% patient effort);lower body;moderate assist (50% patient effort)  -           User Key  (r) = Recorded By, (t) = Taken By, (c) = Cosigned By    Initials Name Provider Type    Alan Velazquez OT Occupational Therapist               Obj/Interventions     Row Name 04/06/23 0845          Shoulder (Therapeutic Exercise)    Shoulder (Therapeutic Exercise) PROM (passive range of motion)  -     Shoulder PROM (Therapeutic Exercise) bilateral;flexion;extension;aBduction;sitting  2 sets x 10 reps  -     Row Name 04/06/23 08          Elbow/Forearm (Therapeutic Exercise)    Elbow/Forearm (Therapeutic Exercise) PROM (passive range of motion)  -     Elbow/Forearm PROM (Therapeutic Exercise) bilateral;flexion;extension  2 sets x 10 reps  -[facility] Name 04/06/23 [facility]          Wrist (Therapeutic Exercise)    Wrist (Therapeutic Exercise) PROM (passive range of motion)  -     Wrist PROM (Therapeutic Exercise) bilateral;flexion;extension  2 sets x 10 reps  -     Row Name 04/06/23 45          Hand (Therapeutic Exercise)    Hand (Therapeutic Exercise) PROM (passive range of motion)  -     Hand PROM (Therapeutic Exercise) bilateral  2 sets x 10 reps  -SJ     Row Name 04/06/23 08          Motor Skills    Therapeutic Exercise shoulder;elbow/forearm;wrist;hand  -           User Key  (r) = Recorded By, (t) = Taken By, (c) = Cosigned By    Initials Name Provider Type    Alan Velazquez OT Occupational Therapist               Goals/Plan    No documentation.                Clinical Impression     Row Name 04/06/23 0845          Pain Assessment    Pretreatment Pain Rating 0/10 - no pain  -     Posttreatment Pain Rating 0/10 - no pain  -     Row Name 04/06/23 [facility]          Plan of Care Review    Plan of Care Reviewed With patient  -     Outcome Evaluation OT tx complete, Rolling R with modified independence, supine to sit with min A. UE bath with min A, LE bath with mod A, toileting  with min A. UE dressing with min A, LE dressing with mod A (socks and pants). Sit to stand with min A, bed to chair t/f with min A, stand pivot t/f to R side. LUE stretching performed, 2 sets x 10 reps, presenting with flexor tone this date. -110 bpm throughout session. Washcloth placed in L Hand, pillows placed for tone management and for stretching to LUE in recliner. Patient denies pain. Up in chair, all needs in reach. Cont OT POC.  -     Row Name 04/06/23 0845          Vital Signs    Pre Systolic BP Rehab 129  -SJ     Pre Treatment Diastolic BP 68  -SJ     Pretreatment Heart Rate (beats/min) 100  -SJ     Posttreatment Heart Rate (beats/min) 103  -SJ     Pre SpO2 (%) 100  -SJ     O2 Delivery Pre Treatment room air  -SJ     Post SpO2 (%) 99  -SJ     O2 Delivery Post Treatment room air  -SJ     Pre Patient Position Supine  -SJ     Post Patient Position Sitting  -     Row Name 04/06/23 0845          Positioning and Restraints    Pre-Treatment Position in bed  -SJ     Post Treatment Position chair  -SJ     In Chair notified nsg;reclined;call light within reach;encouraged to call for assist;exit alarm on;LUE elevated  -SJ           User Key  (r) = Recorded By, (t) = Taken By, (c) = Cosigned By    Initials Name Provider Type     Alan Holder, OT Occupational Therapist               Outcome Measures     Row Name 04/06/23 0845          How much help from another is currently needed...    Putting on and taking off regular lower body clothing? 2  -SJ     Bathing (including washing, rinsing, and drying) 2  -SJ     Toileting (which includes using toilet bed pan or urinal) 3  -SJ     Putting on and taking off regular upper body clothing 3  -SJ     Taking care of personal grooming (such as brushing teeth) 3  -SJ     Eating meals 4  -SJ     AM-PAC 6 Clicks Score (OT) 17  -     Row Name 04/06/23 0845          Functional Assessment    Outcome Measure Options AM-PAC 6 Clicks Daily Activity (OT)  -            User Key  (r) = Recorded By, (t) = Taken By, (c) = Cosigned By    Initials Name Provider Type     Alan Holder OT Occupational Therapist                Occupational Therapy Education     Title: PT OT SLP Therapies (In Progress)     Topic: Occupational Therapy (In Progress)     Point: ADL training (Done)     Description:   Instruct learner(s) on proper safety adaptation and remediation techniques during self care or transfers.   Instruct in proper use of assistive devices.              Learning Progress Summary           Patient Acceptance, E,TB, VU,NR by  at 4/4/2023 1541    Comment: POC, role of OT, transfer training                   Point: Home exercise program (Not Started)     Description:   Instruct learner(s) on appropriate technique for monitoring, assisting and/or progressing therapeutic exercises/activities.              Learner Progress:  Not documented in this visit.          Point: Precautions (Not Started)     Description:   Instruct learner(s) on prescribed precautions during self-care and functional transfers.              Learner Progress:  Not documented in this visit.          Point: Body mechanics (Not Started)     Description:   Instruct learner(s) on proper positioning and spine alignment during self-care, functional mobility activities and/or exercises.              Learner Progress:  Not documented in this visit.                      User Key     Initials Effective Dates Name Provider Type Discipline     06/14/21 -  Alan Holder OT Occupational Therapist OT              OT Recommendation and Plan  Planned Therapy Interventions (OT): activity tolerance training, IADL retraining, BADL retraining, adaptive equipment training, manual therapy/joint mobilization, cognitive/visual perception retraining, edema control/reduction, neuromuscular control/coordination retraining, functional balance retraining, occupation/activity based interventions, ROM/therapeutic exercise, strengthening  exercise, transfer/mobility retraining, passive ROM/stretching, patient/caregiver education/training  Therapy Frequency (OT): other (see comments) (5-7 d/wk)  Plan of Care Review  Plan of Care Reviewed With: patient  Outcome Evaluation: OT tx complete, Rolling R with modified independence, supine to sit with min A. UE bath with min A, LE bath with mod A, toileting with min A. UE dressing with min A, LE dressing with mod A (socks and pants). Sit to stand with min A, bed to chair t/f with min A, stand pivot t/f to R side. LUE stretching performed, 2 sets x 10 reps, presenting with flexor tone this date. -110 bpm throughout session. Washcloth placed in L Hand, pillows placed for tone management and for stretching to LUE in recliner. Patient denies pain. Up in chair, all needs in reach. Cont OT POC.     Time Calculation:    Time Calculation- OT     Row Name 04/06/23 0943             Time Calculation- OT    OT Start Time 0845  -      OT Stop Time 0940  -      OT Time Calculation (min) 55 min  -      Total Timed Code Minutes- OT 55 minute(s)  -SJ      OT Received On 04/06/23  -         Timed Charges    20076 - OT Therapeutic Activity Minutes 15  -SJ      19920 - OT Self Care/Mgmt Minutes 40  -SJ         Total Minutes    Timed Charges Total Minutes 55  -SJ       Total Minutes 55  -SJ            User Key  (r) = Recorded By, (t) = Taken By, (c) = Cosigned By    Initials Name Provider Type     Alan Holder OT Occupational Therapist              Therapy Charges for Today     Code Description Service Date Service Provider Modifiers Qty    87293502989 HC OT THERAPEUTIC ACT EA 15 MIN 4/6/2023 Alan Holder OT GO 1    25442318699 HC OT SELF CARE/MGMT/TRAIN EA 15 MIN 4/6/2023 Alan Holder OT GO 3               Alan Holder OT  4/6/2023

## 2023-04-06 NOTE — THERAPY TREATMENT NOTE
Acute Care - Physical Therapy Treatment Note  [facility]     Patient Name: Sandoval Navarro  : 1960  MRN: [id number]  Today's Date: 2023      Visit Dx:     ICD-10-CM ICD-9-CM   1. Multiple subsegmental pulmonary emboli without acute cor pulmonale  I26.94 415.19   2. Impaired mobility and ADLs  Z74.09 V49.89    Z78.9    3. Impaired functional mobility, balance, gait, and endurance  Z74.09 V49.89     Patient Active Problem List   Diagnosis   • Leukocytosis   • Chest pain   • Dyspnea on exertion   • Chronic fatigue   • PVD (peripheral vascular disease) with claudication   • Cigarette nicotine dependence, uncomplicated   • Overweight (BMI 25.0-29.9)   • Chronic obstructive pulmonary disease   • Carotid stenosis, asymptomatic, right   • ALICE (obstructive sleep apnea)   • Hyperlipemia   • Multiple subsegmental pulmonary emboli without acute cor pulmonale   • Acute on chronic respiratory failure with hypoxia and hypercapnia     Past Medical History:   Diagnosis Date   • Diabetes mellitus    • Gout    • Hyperlipemia      Past Surgical History:   Procedure Laterality Date   • BACK SURGERY     • CHOLECYSTECTOMY     • COLONOSCOPY N/A 3/14/2017    Procedure: COLONOSCOPY;  Surgeon: Cachorro Perez DO;  Location: [facility] ENDOSCOPY;  Service:    • HAND SURGERY     • LEG SURGERY Right    • NOSE SURGERY     • PANCREAS SURGERY     • SPLENECTOMY       PT Assessment (last 12 hours)     PT Evaluation and Treatment     Row Name 23 1607          Physical Therapy Time and Intention    Subjective Information no complaints  -LN     Document Type therapy note (daily note)  -LN     Mode of Treatment individual therapy;physical therapy  -LN     Row Name 23 1607          General Information    Patient Profile Reviewed yes  -LN     Existing Precautions/Restrictions fall  -LN     Row Name 23 1607          Home Use of Assistive/Adaptive Equipment    Equipment Currently Used at Home bath  bench;commode;wheelchair;cpap;cane, quad  -[facility] Name 04/06/23 1607          Pain    Pretreatment Pain Rating 0/10 - no pain  -LN     Posttreatment Pain Rating 0/10 - no pain  -[facility] Name 04/06/23 1607          Cognition    Orientation Status (Cognition) oriented x 4  -[facility] Name 04/06/23 1607          Range of Motion Comprehensive    General Range of Motion bilateral lower extremity ROM WFL  -LN     Row Name 04/06/23 1607          Strength Comprehensive (MMT)    General Manual Muscle Testing (MMT) Assessment other (see comments)  -[facility] Name 04/06/23 1607          Bed Mobility    Bed Mobility rolling right;supine-sit  -LN     Rolling Left [place] (Bed Mobility) moderate assist (50% patient effort)  -LN     Rolling Right [place] (Bed Mobility) modified independence  -LN     Supine-Sit [place] (Bed Mobility) minimum assist (75% patient effort)  -LN     Sit-Supine [place] (Bed Mobility) minimum assist (75% patient effort)  -LN     Assistive Device (Bed Mobility) bed rails;head of bed elevated  -LN     Row Name 04/06/23 1607          Transfers    Transfers sit-stand transfer;bed-chair transfer  -LN     Row Name 04/06/23 1607          Bed-Chair Transfer    Bed-Chair [place] (Transfers) minimum assist (75% patient effort)  -[facility] Name 04/06/23 1607          Sit-Stand Transfer    Sit-Stand [place] (Transfers) minimum assist (75% patient effort)  -LN     Assistive Device (Sit-Stand Transfers) walker, front-wheeled  -[facility] Name 04/06/23 1607          Stand-Sit Transfer    Stand-Sit [place] (Transfers) minimum assist (75% patient effort)  -LN     Assistive Device (Stand-Sit Transfers) walker, front-wheeled  -LN     Row Name 04/06/23 1607          Gait/Stairs (Locomotion)    [place] Level (Gait) unable to assess  -     [place] Level (Stairs) unable to assess;not tested  -LN     Row Name 04/06/23 1607          Safety Issues, Functional Mobility     Impairments Affecting Function (Mobility) balance;coordination;endurance/activity tolerance;grasp;motor control;motor planning;muscle tone abnormal;pain;strength;sensation/sensory awareness;range of motion (ROM)  -LN     Row Name 04/06/23 1607          Hip (Therapeutic Exercise)    Hip (Therapeutic Exercise) AROM (active range of motion);strengthening exercise  -LN     Hip AROM (Therapeutic Exercise) bilateral;flexion;extension  -LN     Hip Strengthening (Therapeutic Exercise) --  bridging and unilateral bridges  -LN     Row Name 04/06/23 1607          Ankle (Therapeutic Exercise)    Ankle (Therapeutic Exercise) AAROM (active assistive range of motion)  -LN     Ankle AAROM (Therapeutic Exercise) left  -LN     Row Name 04/06/23 1607          Respiratory WDL    Respiratory WDL cough;breath sounds  -LN     Row Name 04/06/23 1607          Breath Sounds    Breath Sounds All Fields  -LN     All Lung Fields Breath Sounds Anterior:;diminished;equal bilaterally  -LN     Row Name 04/06/23 1607          Skin WDL    Skin WDL X;color;characteristics  -LN     Skin Color/Characteristics pale  -LN     Skin Temperature warm  -LN     Skin Elasticity quick return to original state  -LN     Skin Integrity bruised (ecchymotic);scab  R elbow and ankle scab, redness/abrasions to lower extremities  -LN     Row Name 04/06/23 1607          Coping    Observed Emotional State calm;cooperative  -LN     Verbalized Emotional State acceptance  -LN     Family/Support Persons family  -LN     Involvement in Care at bedside  -LN     Row Name 04/06/23 1607          Plan of Care Review    Plan of Care Reviewed With patient  -LN     Outcome Evaluation sup-sit-sup min of 1,sit-stand-sit min of 1-while in standing worked on equal wt bearing lore le's,stepping forward/backward with left le then r le-pt holding to rw for this activity-will bring bryanna walker next rx  -LN     Row Name 04/06/23 1607          Vital Signs    Pre Systolic BP Rehab 141  -LN     Pre  Treatment Diastolic BP 79  -LN     Post Systolic BP Rehab 140  -LN     Post Treatment Diastolic BP 86  -LN     Pretreatment Heart Rate (beats/min) 84  -LN     Pre SpO2 (%) 94  -LN     O2 Delivery Pre Treatment room air  -LN     Pre Patient Position Supine  -LN     Intra Patient Position Standing  -LN     Post Patient Position Sitting  -LN     Row Name 04/06/23 1607          Positioning and Restraints    In Bed fowlers;call light within reach;encouraged to call for assist;exit alarm on;side rails up x3  -LN     Row Name 04/06/23 1607          Therapy Assessment/Plan (PT)    Rehab Potential (PT) good, to achieve stated therapy goals  -LN     Criteria for Skilled Interventions Met (PT) yes  -LN     Therapy Frequency (PT) other (see comments)  5-7 d/w;  -LN     Row Name 04/06/23 1607          Bed Mobility Goal 1 (PT)    Activity/Assistive Device (Bed Mobility Goal 1, PT) bed mobility activities, all  -LN     [place] Level/Cues Needed (Bed Mobility Goal 1, PT) modified independence  -LN     Time Frame (Bed Mobility Goal 1, PT) 1 week  -LN     Progress/Outcomes (Bed Mobility Goal 1, PT) goal partially met  -LN     Row Name 04/06/23 1607          Transfer Goal 1 (PT)    Activity/Assistive Device (Transfer Goal 1, PT) bed-to-chair/chair-to-bed  -LN     [place] Level/Cues Needed (Transfer Goal 1, PT) standby assist  -LN     Time Frame (Transfer Goal 1, PT) 2 weeks  -LN     Progress/Outcome (Transfer Goal 1, PT) goal not met  -LN     Row Name 04/06/23 1607          Gait Training Goal 1 (PT)    Activity/Assistive Device (Gait Training Goal 1, PT) gait (walking locomotion);assistive device use;walker, rolling;decrease fall risk;decrease asymmetrical patterns;diminish gait deviation  -LN     [place] Level (Gait Training Goal 1, PT) minimum assist (75% or more patient effort)  -LN     Distance (Gait Training Goal 1, PT) 10 ft w/ FWRW and step to or step thru  gait LLE w/out LOB w/ VSS  -LN     Time Frame (Gait  Training Goal 1, PT) 10 days  -LN     Progress/Outcome (Gait Training Goal 1, PT) goal not met  -LN     Row Name 04/06/23 1607          Problem Specific Goal 1 (PT)    Problem Specific Goal 1 (PT) standing balance min assist of 1 w/ VSS and no lightheaded feeling  -LN     Time Frame (Problem Specific Goal 1, PT) 5 days  -LN     Progress/Outcome (Problem Specific Goal 1, PT) goal not met  -LN     Row Name 04/06/23 1607          Patient Education Goal (PT)    Activity (Patient Education Goal, PT) Pt will demo indep ther ex for NMR to be able to complete these tasks alone as HEP  -LN     [place]/Cues/Accuracy (Memory Goal 2, PT) demonstrates adequately  -LN     Time Frame (Patient Education Goal, PT) 1 week  -LN     Progress/Outcome (Patient Education Goal, PT) new goal  -LN           User Key  (r) = Recorded By, (t) = Taken By, (c) = Cosigned By    Initials Name Provider Type    LN Becky Garduno PTA Physical Therapist Assistant                Physical Therapy Education     Title: PT OT SLP Therapies (In Progress)     Topic: Physical Therapy (Done)     Point: Mobility training (Done)     Learning Progress Summary           Patient Acceptance, E,D, LESLI,ANKIT,NR by MAKSIM at 4/4/2023 1716    Comment: POC; hooklying RLE then pull up LLE into flx and maintain hooklying; as able, progress to hip rotation R/L wo/out losing control LLE; try DF L foot by using R foot under L to get DF P/AAROM  L foot                   Point: Home exercise program (Done)     Learning Progress Summary           Patient Acceptance, E,D, LESLI,ANKIT,NR by MAKSIM at 4/4/2023 1716    Comment: POC; hooklying RLE then pull up LLE into flx and maintain hooklying; as able, progress to hip rotation R/L wo/out losing control LLE; try DF L foot by using R foot under L to get DF P/AAROM  L foot                   Point: Body mechanics (Done)     Learning Progress Summary           Patient Acceptance, E,D, VU,DU,NR by MAKSIM at 4/4/2023 1716    Comment: POC; hooklying RLE  then pull up LLE into flx and maintain hooklying; as able, progress to hip rotation R/L wo/out losing control LLE; try DF L foot by using R foot under L to get DF P/AAROM  L foot                   Point: Precautions (Done)     Learning Progress Summary           Patient Acceptance, E,D, VU,DU,NR by  at 4/4/2023 1718    Comment: POC; hooklying RLE then pull up LLE into flx and maintain hooklying; as able, progress to hip rotation R/L wo/out losing control LLE; try DF L foot by using R foot under L to get DF P/AAROM  L foot                               User Key     Initials Effective Dates Name Provider Type Discipline     06/16/21 -  Nargis Garcia, PT Physical Therapist PT              PT Recommendation and Plan  Anticipated Discharge Disposition (PT): inpatient rehabilitation facility, other (see comments) (rehab program to home)  Therapy Frequency (PT): other (see comments) (5-7 d/w;)  Plan of Care Reviewed With: patient  Outcome Evaluation: sup-sit-sup min of 1,sit-stand-sit min of 1-while in standing worked on equal wt bearing lore le's,stepping forward/backward with left le then r le-pt holding to rw for this activity-will bring bryanna walker next rx   Outcome Measures     Row Name 04/06/23 1607             How much help from another person do you currently need...    Turning from your back to your side while in flat bed without using bedrails? 3  -LN      Moving from lying on back to sitting on the side of a flat bed without bedrails? 3  -LN      Moving to and from a bed to a chair (including a wheelchair)? 3  -LN      Standing up from a chair using your arms (e.g., wheelchair, bedside chair)? 3  -LN      Climbing 3-5 steps with a railing? 2  -LN      To walk in hospital room? 2  -LN      AM-PAC 6 Clicks Score (PT) 16  -LN         Functional Assessment    Outcome Measure Options AM-PAC 6 Clicks Daily Activity (OT)  -LN            User Key  (r) = Recorded By, (t) = Taken By, (c) = Cosigned By     Initials Name Provider Type    Becky Metcalf PTA Physical Therapist Assistant                 Time Calculation:    PT Charges     Row Name 04/06/23 1648             Time Calculation    Start Time 1607  -LN      Stop Time 1635  -LN      Time Calculation (min) 28 min  -LN      PT Received On 04/06/23  -LN         Time Calculation- PT    Total Timed Code Minutes- PT 28 minute(s)  -LN            User Key  (r) = Recorded By, (t) = Taken By, (c) = Cosigned By    Initials Name Provider Type    LN Becky Garduno, YARELI Physical Therapist Assistant              Therapy Charges for Today     Code Description Service Date Service Provider Modifiers Qty    34322522075 HC PT THERAPEUTIC ACT EA 15 MIN 4/6/2023 Becky Garduno PTA GP 1    60838403646 HC PT THER PROC EA 15 MIN 4/6/2023 Becky Garduno PTA GP 1          PT G-Codes  Outcome Measure Options: AM-PAC 6 Clicks Daily Activity (OT)  AM-PAC 6 Clicks Score (PT): 16  AM-PAC 6 Clicks Score (OT): 17    Becky Garduno PTA  4/6/2023

## 2023-04-07 VITALS
OXYGEN SATURATION: 97 % | BODY MASS INDEX: 22.8 KG/M2 | TEMPERATURE: 98 F | SYSTOLIC BLOOD PRESSURE: 103 MMHG | WEIGHT: 177.69 LBS | HEIGHT: 74 IN | DIASTOLIC BLOOD PRESSURE: 55 MMHG | RESPIRATION RATE: 20 BRPM | HEART RATE: 87 BPM

## 2023-04-07 LAB
ABO GROUP BLD: NORMAL
BLD GP AB SCN SERPL QL: NEGATIVE
Lab: NORMAL
RH BLD: POSITIVE
T&S EXPIRATION DATE: NORMAL

## 2023-04-07 PROCEDURE — 96361 HYDRATE IV INFUSION ADD-ON: CPT

## 2023-04-07 PROCEDURE — G0378 HOSPITAL OBSERVATION PER HR: HCPCS

## 2023-04-07 RX ORDER — METOPROLOL SUCCINATE 25 MG/1
12.5 TABLET, EXTENDED RELEASE ORAL
Start: 2023-04-07

## 2023-04-07 RX ORDER — PANTOPRAZOLE SODIUM 40 MG/10ML
40 INJECTION, POWDER, LYOPHILIZED, FOR SOLUTION INTRAVENOUS EVERY 12 HOURS SCHEDULED
Start: 2023-04-07

## 2023-04-07 RX ORDER — SODIUM CHLORIDE 9 MG/ML
200 INJECTION, SOLUTION INTRAVENOUS CONTINUOUS
Start: 2023-04-07

## 2023-04-07 RX ADMIN — SODIUM CHLORIDE 100 ML/HR: 9 INJECTION, SOLUTION INTRAVENOUS at 00:35

## 2023-04-07 NOTE — DISCHARGE SUMMARY
[facility]  DISCHARGE SUMMARY       Date of Admission: 4/3/2023  Date of Discharge:  4/7/2023  Primary Care Physician: Guanako Aktins MD    Presenting Problem/History of Present Illness:  Multiple subsegmental pulmonary emboli without acute cor pulmonale [I26.94]  GIB (gastrointestinal bleeding) [K92.2]       Final Discharge Diagnoses:  Lower GI bleeding in need of transfer to tertiary center.  Active Hospital Problems    Diagnosis    • **Acute on chronic respiratory failure with hypoxia and hypercapnia    • Multiple subsegmental pulmonary emboli without acute cor pulmonale    • Hyperlipemia    • Chronic obstructive pulmonary disease    • Cigarette nicotine dependence, uncomplicated    • Dyspnea on exertion    • Chronic fatigue          Consults:   Consults     No orders found from 3/5/2023 to 4/4/2023.          Procedures Performed:                 Pertinent Test Results:   Lab Results (most recent)     Procedure Component Value Units Date/Time    Respiratory Culture - Sputum, Cough [[id number]] Collected: 04/06/23 2200    Specimen: Sputum from Cough Updated: 04/07/23 0011     Gram Stain Rare (1+) Epithelial cells per low power field      Moderate (3+) WBCs per low power field      Mixed bacterial aakash    aPTT [[id number]]  (Normal) Collected: 04/06/23 2159    Specimen: Blood Updated: 04/06/23 2238     PTT 20.1 seconds     Narrative:      The recommended Heparin therapeutic range is 68-97 seconds.    Protime-INR [[id number]]  (Normal) Collected: 04/06/23 2159    Specimen: Blood Updated: 04/06/23 2237     Protime 14.5 Seconds      INR 1.13    Narrative:      Therapeutic range for most indications is 2.0-3.0 INR,  or 2.5-3.5 for mechanical heart valves.    Blood Gas, Venous With Co-Ox [[id number]]  (Abnormal) Collected: 04/06/23 2205    Specimen: Venous Blood Updated: 04/06/23 2207     Site OTHER     pH, Venous 7.440 pH Units      pCO2, Venous 41.8 mm Hg       pO2, Venous 68.5 mm Hg      Comment: 83 Value above reference range        HCO3, Venous 28.3 mmol/L      Comment: 83 Value above reference range        Base Excess, Venous 3.8 mmol/L      Comment: 83 Value above reference range        O2 Saturation, Venous 95.1 %      Comment: 83 Value above reference range        Hemoglobin, Blood Gas 12.8 g/dL      Comment: 84 Value below reference range        Oxyhemoglobin Venous 91.1 %      Comment: 83 Value above reference range        Methemoglobin Venous 0.9 %      Carboxyhemoglobin Venous 3.4 %      Sodium, Venous 139 mmol/L      Potassium, Venous 3.8 mmol/L      Barometric Pressure for Blood Gas 757 mmHg      Modality Room Air     Ventilator Mode NA     Comment: Meter: [id number]     :  [id number]        Note --    POC Glucose Once [[id number]]  (Abnormal) Collected: 04/06/23 2019    Specimen: Blood Updated: 04/06/23 2104     Glucose 182 mg/dL      Comment: Result Not ConfirmedOperator: [id number] LEAR AMANDAMeter ID: [id number]       POC Glucose Once [[id number]]  (Normal) Collected: 04/06/23 1650    Specimen: Blood Updated: 04/06/23 1852     Glucose 91 mg/dL      Comment: RN NotifiedOperator: [id number] CHRISTIANNE Sanchez ID: [id number]       Blood Culture - Blood, Arm, Left [[id number]]  (Normal) Collected: 04/03/23 1827    Specimen: Blood from Arm, Left Updated: 04/06/23 1846     Blood Culture No growth at 3 days    Blood Culture - Blood, Hand, Right [[id number]]  (Normal) Collected: 04/03/23 1827    Specimen: Blood from Hand, Right Updated: 04/06/23 1846     Blood Culture No growth at 3 days    Basic Metabolic Panel [[id number]]  (Abnormal) Collected: 04/04/23 0736    Specimen: Blood Updated: 04/04/23 0817     Glucose 182 mg/dL      BUN 13 mg/dL      Creatinine 0.74 mg/dL      Sodium 141 mmol/L      Potassium 4.3 mmol/L      Chloride 105 mmol/L      CO2 28.0 mmol/L      Calcium 9.1 mg/dL      BUN/Creatinine Ratio 17.6     Anion Gap 8.0 mmol/L      eGFR  101.2 mL/min/1.73     Narrative:      GFR Normal >60  Chronic Kidney Disease <60  Kidney Failure <15      CBC Auto Differential [[id number]]  (Abnormal) Collected: 04/04/23 0736    Specimen: Blood Updated: 04/04/23 0806     WBC 11.09 10*3/mm3      RBC 4.76 10*6/mm3      Hemoglobin 13.6 g/dL      Hematocrit 41.0 %      MCV 86.1 fL      MCH 28.6 pg      MCHC 33.2 g/dL      RDW 13.4 %      RDW-SD 41.6 fl      MPV 10.5 fL      Platelets 318 10*3/mm3      Neutrophil % 53.0 %      Lymphocyte % 35.4 %      Monocyte % 8.7 %      Eosinophil % 1.3 %      Basophil % 1.0 %      Immature Grans % 0.6 %      Neutrophils, Absolute 5.88 10*3/mm3      Lymphocytes, Absolute 3.93 10*3/mm3      Monocytes, Absolute 0.96 10*3/mm3      Eosinophils, Absolute 0.14 10*3/mm3      Basophils, Absolute 0.11 10*3/mm3      Immature Grans, Absolute 0.07 10*3/mm3      nRBC 0.0 /100 WBC     S. Pneumo Ag Urine or CSF - Urine, Urine, Clean Catch [[id number]]  (Normal) Collected: 04/03/23 1330    Specimen: Urine, Clean Catch Updated: 04/03/23 1817     Strep Pneumo Ag Negative    High Sensitivity Troponin T 2Hr [[id number]]  (Normal) Collected: 04/03/23 1559    Specimen: Blood Updated: 04/03/23 1621     HS Troponin T 11 ng/L      Troponin T Delta 0 ng/L     Narrative:      High Sensitive Troponin T Reference Range:  <10.0 ng/L- Negative Female for AMI  <15.0 ng/L- Negative Male for AMI  >=10 - Abnormal Female indicating possible myocardial injury.  >=15 - Abnormal Male indicating possible myocardial injury.   Clinicians would have to utilize clinical acumen, EKG, Troponin, and serial changes to determine if it is an Acute Myocardial Infarction or myocardial injury due to an underlying chronic condition.         Extra Tubes [[id number]] Collected: 04/03/23 1334    Specimen: Blood, Venous Line Updated: 04/03/23 1445    Narrative:      The following orders were created for panel order Extra Tubes.  Procedure                               Abnormality          Status                     ---------                               -----------         ------                     Gold Top - SST[[id number]]                                   Final result                 Please view results for these tests on the individual orders.    Gold Top - SST [[id number]] Collected: 04/03/23 1334    Specimen: Blood Updated: 04/03/23 1445     Extra Tube Hold for add-ons.     Comment: Auto resulted.       Urinalysis With Microscopic If Indicated (No Culture) - Urine, Clean Catch [[id number]]  (Abnormal) Collected: 04/03/23 1330    Specimen: Urine, Clean Catch Updated: 04/03/23 1408     Color, UA Yellow     Appearance, UA Clear     pH, UA 6.0     Specific Gravity, UA 1.031     Comment: Result obtained by Refractometer        Glucose, UA >=1000 mg/dL (3+)     Ketones, UA Negative     Bilirubin, UA Negative     Blood, UA Negative     Protein, UA Negative     Leuk Esterase, UA Negative     Nitrite, UA Negative     Urobilinogen, UA 0.2 E.U./dL    Narrative:      Urine microscopic not indicated.    High Sensitivity Troponin T [[id number]]  (Normal) Collected: 04/03/23 1329    Specimen: Blood Updated: 04/03/23 1403     HS Troponin T 11 ng/L     Narrative:      High Sensitive Troponin T Reference Range:  <10.0 ng/L- Negative Female for AMI  <15.0 ng/L- Negative Male for AMI  >=10 - Abnormal Female indicating possible myocardial injury.  >=15 - Abnormal Male indicating possible myocardial injury.   Clinicians would have to utilize clinical acumen, EKG, Troponin, and serial changes to determine if it is an Acute Myocardial Infarction or myocardial injury due to an underlying chronic condition.         TSH [[id number]]  (Normal) Collected: 04/03/23 1329    Specimen: Blood Updated: 04/03/23 1403     TSH 1.630 uIU/mL     D-dimer, Quantitative [[id number]]  (Abnormal) Collected: 04/03/23 1329    Specimen: Blood Updated: 04/03/23 1401     D-Dimer, Quantitative 820 ng/mL (FEU)     Narrative:      According to the  "assay 's published package insert, a normal (<500 ng/mL (FEU)) D-dimer result in conjunction with a non-high clinical probability assessment, excludes deep vein thrombosis (DVT) and pulmonary embolism (PE) with high sensitivity.    D-dimer values increase with age and this can make VTE exclusion of an older population difficult. To address this, the [employer], based on best available evidence and recent guidelines, recommends that clinicians use age-adjusted D-dimer thresholds in patients greater than 50 years of age with: a) a low probability of PE who do not meet all Pulmonary Embolism Rule Out Criteria, or b) in those with intermediate probability of PE.   The formula for an age-adjusted D-dimer cut-off is \"age*10\".  For example, a 60 year old patient would have an age-adjusted cut-off of 600 ng/mL (FEU) and an 80 year old 800 ng/mL (FEU).      Comprehensive Metabolic Panel [[id number]]  (Abnormal) Collected: 04/03/23 1329    Specimen: Blood Updated: 04/03/23 1357     Glucose 229 mg/dL      BUN 13 mg/dL      Creatinine 0.78 mg/dL      Sodium 137 mmol/L      Potassium 4.1 mmol/L      Chloride 101 mmol/L      CO2 28.0 mmol/L      Calcium 9.1 mg/dL      Total Protein 6.3 g/dL      Albumin 3.6 g/dL      ALT (SGPT) 24 U/L      AST (SGOT) 19 U/L      Alkaline Phosphatase 86 U/L      Total Bilirubin 0.2 mg/dL      Globulin 2.7 gm/dL      A/G Ratio 1.3 g/dL      BUN/Creatinine Ratio 16.7     Anion Gap 8.0 mmol/L      eGFR 99.6 mL/min/1.73     Narrative:      GFR Normal >60  Chronic Kidney Disease <60  Kidney Failure <15      Magnesium [[id number]]  (Normal) Collected: 04/03/23 1329    Specimen: Blood Updated: 04/03/23 1357     Magnesium 2.0 mg/dL     CBC & Differential [[id number]]  (Abnormal) Collected: 04/03/23 1329    Specimen: Blood Updated: 04/03/23 1337    Narrative:      The following orders were created for panel order CBC & Differential.  Procedure                               " Abnormality         Status                     ---------                               -----------         ------                     CBC Auto Differential[[id number]]        Abnormal            Final result                 Please view results for these tests on the individual orders.    CBC Auto Differential [[id number]]  (Abnormal) Collected: 04/03/23 1329    Specimen: Blood Updated: 04/03/23 1337     WBC 13.60 10*3/mm3      RBC 4.85 10*6/mm3      Hemoglobin 13.8 g/dL      Hematocrit 42.6 %      MCV 87.8 fL      MCH 28.5 pg      MCHC 32.4 g/dL      RDW 13.3 %      RDW-SD 42.9 fl      MPV 9.8 fL      Platelets 311 10*3/mm3      Neutrophil % 70.8 %      Lymphocyte % 21.3 %      Monocyte % 6.3 %      Eosinophil % 0.4 %      Basophil % 0.6 %      Immature Grans % 0.6 %      Neutrophils, Absolute 9.62 10*3/mm3      Lymphocytes, Absolute 2.90 10*3/mm3      Monocytes, Absolute 0.86 10*3/mm3      Eosinophils, Absolute 0.06 10*3/mm3      Basophils, Absolute 0.08 10*3/mm3      Immature Grans, Absolute 0.08 10*3/mm3      nRBC 0.0 /100 WBC         Imaging Results (Most Recent)     Procedure Component Value Units Date/Time    CT Abdomen Pelvis With Contrast [[id number]] Collected: 04/06/23 2259     Updated: 04/06/23 2306    Narrative:      CT ABDOMEN PELVIS W CONTRAST    HISTORY: lower GIB with abd pain    COMPARISON: 8/27/2020.    Automated exposure control was also utilized to decrease patient radiation dose.    TECHNIQUE: Following the intravenous administration of contrast, helical CT  tomographic images of the abdomen and pelvis were acquired. Multiplanar  reformatted images were provided for review.    FINDINGS:  LOWER CHEST: There is left basilar atelectasis..    LIVER: No focal liver lesion. The hepatic vasculature is patent. There is  moderate motion artifact.    SPLEEN: The spleen appears absent. There are small splenules..    BILIARY SYSTEM: The gallbladder has been removed. There is no evidence of  biliary ductal  dilation.    PANCREAS: No focal pancreatic lesion.    ADRENALS: Unremarkable.    KIDNEYS: Bilateral kidneys are unremarkable. The ureters are decompressed and  normal in appearance.    RETROPERITONEUM: No pathologic adenopathy is seen.    VASCULATURE: There is moderate atherosclerotic disease of the aorta and branch  vessels.    SMALL BOWEL/STOMACH: No evidence of obstruction or bowel wall thickening.    COLON: There is sigmoid diverticulosis without acute diverticulitis. The rectum  is distended with fluid and stool. No surrounding inflammation is seen. There is  some high attenuation in the descending colon. It is possible this represents an  active bleed. This is seen on series 2 image 45.. The appendix is visualized and  normal in appearance    PELVIS: No mass lesion, fluid collection or significant lymphadenopathy is seen  in the pelvis. The urinary bladder is within normal limits .    BONES:  There is no acute osseous abnormality.          Impression:      1. There is some high density material within the lumen of the mid descending  colon. It is possible this represents an active bleeding site as no additional  areas of high density stool are seen in the remainder of the colon.  2. The colon distal to this region is distended with fluid and stool, but not  obstructed.        CT Head Without Contrast [[id number]] Collected: 04/06/23 1210     Updated: 04/06/23 1241    Narrative:      HISTORY:  Fall.    COMPARISON:  None.    TECHNIQUE:  Routine helical imaging through the brain with axial, coronal and sagittal  two-dimensional reformatted images.    FINDINGS:  Vague area of decreased attenuation at the right frontoparietal convexity  measuring up to about 4.6 cm as seen on image 39 of series 2.  There is also  extensive chronic microvascular ischemic change with areas of old infarct  involving the right parieto-occipital lobe and left frontal lobe.  No midline  shift or mass effect.  No acute hemorrhage.  There  is mild prominence of the  lateral ventricles, presumably on the basis of central atrophy.    No calvarial fracture is seen.      Impression:      1. Vague, ovoid area of ill-defined low-attenuation near the right  frontoparietal convexity.  Subacute infarct or underlying mass/edema could be  considered.  Follow-up brain MRI without and with gadolinium would be most  useful.    2.  Extensive chronic ischemic change with large areas of encephalomalacia in  the left frontal and right parietooccipital lobes.    CT Angiogram Chest [[id number]] Collected: 04/03/23 1600     Updated: 04/03/23 1653    Narrative:      INDICATION:  Shortness of breath.  Elevated d-dimer.    TECHNIQUE:  IV contrast was administered and axial images from the thoracic inlet through  the diaphragms were performed.  3D multiplanar reformats of the thoracic aorta  were completed on a separate workstation under concurrent supervision.    COMPARISON:  9/6/2018.    FINDINGS:  There is mild pulmonary embolism in the right middle and lower lobe pulmonary  arteries extending to the segmental and subsegmental branches in the right lower  lobe.  Peripheral nonocclusive thrombus seen, however, appears to be chronic  rather than acute.    No pleural or pericardial effusion or lymphadenopathy is seen.  Upper lobe  dominant emphysema is seen in the lungs.  Mild bibasilar lung changes that are  more marked on the left may be from pneumonia or atelectasis.    Visualized upper abdomen shows surgical clips from a cholecystectomy with  additional surgical clips likely from a splenectomy and a distal pancreatectomy.  Splenules noted along the greater curvature of the stomach without significant  change.  No significant bone abnormality is seen.      Impression:        1.  Exam positive for pulmonary embolism in right middle and lower lobe  pulmonary artery branches extending to the segmental and subsegmental branches  primarily of the right lower lobe.  Embolic  burden is mild.  Given the  peripheral location of the nonocclusive thrombus it is most likely chronic.  No  definite acute pulmonary embolism is seen.    2.  Mild bibasilar lung changes may be secondary to atelectasis or pneumonia.    XR Chest 1 View [[id number]] Collected: 04/03/23 1418     Updated: 04/03/23 1431    Narrative:      AP CHEST:    HISTORY:  Tachycardia.    COMPARISON:  02/01/2019.    FINDINGS:  Slight prominence of the interstitial markings, but no focal airspace disease.  Is there history of tobacco use or reactive airways disease?  Otherwise, mild  viral pneumonitis could be considered. No pleural effusion or pneumothorax was  seen.    The heart is normal in size.  Mediastinal contour is within normal limits.    The visualized osseous structures appear intact.              Chief Complaint on Day of Discharge:     Hospital Course:  The patient is a 62 y.o. male who presented to [facility] with initial complaint of dyspnea and palpitation.  He underwent a CTA of the chest in ED which was concerning for multiple subsegmental pulmonary emboli.  He was subsequently admitted to the hospital on 4/3/2023 with initial diagnosis of acute on chronic respiratory failure with hypoxia and hypercapnia, multiple subsegmental pulmonary emboli without acute cor pulmonale, COPD, hyperlipidemia.  While hospitalized he was placed on systemic anticoagulation therapy Eliquis and IV antibiotic as supportive therapy.  With above treatment patient condition gradually improved.  On 04/06/2023 at night patient started developing lower GI bleeding with multiple bloody bowel movements.  He was given 2 L IV fluid bolus and was placed on high rate IV fluid as Protonix and he was moved to intensive care unit concerning lower GI bleeding with recurrent bloody bowel movement and being on Eliquis. Order for every 6 hours H&H was placed an order for type and screen blood and packed red blood cell transfusion.  He was  "informed about risk and benefit of packed red blood cell transfusion and he agreed with packed red blood cell transfusion as needed.  He underwent stat CT of the abdomen and pelvis.  His CT of the abdomen and pelvis showed \"There is some high density material within the lumen of the mid descending colon. It is possible this represents an active bleeding site as no additional areas of high density stool are seen in the remainder of the colon. The colon distal to this region is distended with fluid and stool, but not obstructed.\"      I discussed his care with gastroenterologist on-call Dr. Unger.  His consideration was GI bleeding possibly related to diverticular bleeding.  He was hopeful that bleeding may stop by itself but recommended transfer to tertiary center if patient continues to have lower GI bleeding.  Patient continued to have lower GI bleeding.  Hemodynamically he was stabilized on 2 L IV fluid bolus and high rate IV fluid 200 cc/h for immediate care.  2 unit packed red blood cells was ordered too.  Concerning ongoing signs of lower GI bleeding, I contacted multiple tertiary centers for transfer of the patient.  Tonight upon availability of transfer to [facility] to care of ICU service, , patient is to be transferred to [facility].  On evaluation prior to transfer, patient resting in bed on room air denies any active complaint or pain.  His most recent vitals are as follow  blood pressure of 103/55, heart rate of 87 pulse oximetry 97% on room air.  Patient and his wife were informed about risks and benefit of transfer to [facility] for ongoing medical care, GI bleeding.  Patient and his wife agreed with transfer to [facility].      Condition on Discharge: Stable for transfer to [facility]    Physical Exam on Discharge:  /55   Pulse 87   Temp 98 °F (36.7 °C)   Resp 20   Ht 188 cm (74\")   Wt 80.6 kg (177 lb 11.1 oz)   SpO2 97%   BMI 22.81 kg/m² "   Physical Exam  HEENT: Head is atraumatic normocephalic.  Pupils and conjunctiva grossly unremarkable.  Neck: Supple non-tender  Heart/CV: Regular rate and rhythm with S1 and S2 no strainers for  Respiratory/lungs: Moderate air movement both lungs with no wheezing or crackles  GI/abdomen: Soft nontender no guarding or rebound present  Musculoskeletal/extremity: No edema muscular bulk and tone preserved    Discharge Disposition:  Short Term Hospital (DC - External)    Discharge Medications:  Discharge outpatient medication to be determined at [facility] time [facility].     Discharge Medications      New Medications      Instructions Start Date   albuterol sulfate  (90 Base) MCG/ACT inhaler  Commonly known as: PROVENTIL HFA;VENTOLIN HFA;PROAIR HFA   2 puffs, Inhalation, Every 4 Hours PRN      azithromycin 250 MG tablet  Commonly known as: Zithromax Z-Adeel   250 mg, Oral, Daily, Take 2 tablets by mouth on day 1, then 1 tablet daily on days 2-5      cefdinir 300 MG capsule  Commonly known as: OMNICEF   300 mg, Oral, 2 Times Daily      cefTRIAXone  Commonly known as: ROCEPHIN   1 g, Intravenous, Every 24 Hours      metoprolol succinate XL 25 MG 24 hr tablet  Commonly known as: TOPROL-XL   12.5 mg, Oral, Every 24 Hours Scheduled      pantoprazole 40 MG injection  Commonly known as: PROTONIX   40 mg, Intravenous, Every 12 Hours Scheduled      sodium chloride 0.9 % solution   200 mL/hr (200 mL/hr), Intravenous, Continuous         Changes to Medications      Instructions Start Date   BASAGLAR KWIKPEN 100 UNIT/ML injection pen  What changed: how much to take   41 Units, Subcutaneous, Daily      simvastatin 10 MG tablet  Commonly known as: ZOCOR  What changed: when to take this   10 mg, Oral, Nightly         Continue These Medications      Instructions Start Date   acetaminophen 325 MG tablet  Commonly known as: TYLENOL   650 mg, Oral, Every 6 Hours PRN      atorvastatin 20 MG  tablet  Commonly known as: LIPITOR   20 mg, Oral, Nightly      cyanocobalamin 1000 MCG/ML injection  Notes to patient: As directed   1,000 mcg, Intramuscular, Every 14 Days      Farxiga 5 MG tablet tablet  Generic drug: dapagliflozin   10 mg, Oral, Daily      FiberCon 625 MG tablet  Generic drug: calcium polycarbophil   2 tablets, Oral, Daily      fluticasone 50 MCG/ACT nasal spray  Commonly known as: FLONASE   1 spray, Nasal, Daily      FreeStyle Fina 2 Sensor misc   REPLACE SENSOR E 14 DAYS      glucose blood test strip   USE TO TEST BLOOD SUGAR 3 TIMES A DAY      guaiFENesin 600 MG 12 hr tablet  Commonly known as: MUCINEX   1,200 mg, Oral, 2 Times Daily      Insulin Lispro 100 UNIT/ML injection  Commonly known as: humaLOG   8 Units, Subcutaneous, 3 Times Daily Before Meals      lubiprostone 8 MCG capsule  Commonly known as: AMITIZA   8 mcg, Oral, 2 Times Daily      magnesium oxide 400 MG tablet  Commonly known as: MAG-OX   400 mg, Oral, Daily      nicotine 21 MG/24HR patch  Commonly known as: NICODERM CQ   1 patch, Transdermal, Every 24 Hours      nicotine 14 MG/24HR patch  Commonly known as: NICODERM CQ   1 patch, Transdermal, Every 24 Hours   Start Date: May 9, 2023     nicotine 7 MG/24HR patch  Commonly known as: NICODERM CQ   1 patch, Transdermal, Every 24 Hours   Start Date: May 23, 2023     Nicotine Mini 4 MG lozenge  Generic drug: nicotine polacrilex   4 mg, Mouth/Throat, Every 6 Hours PRN      nystatin 526978 UNIT/GM powder  Commonly known as: MYCOSTATIN   1 application, Topical, As Needed, Apply to groin      omeprazole 40 MG capsule  Commonly known as: priLOSEC   40 mg, Oral, Daily      Pancrelipase (Lip-Prot-Amyl) [id number] units capsule delayed-release particles capsule  Commonly known as: CREON   108,000 units of lipase, Oral, 3 Times Daily      tamsulosin 0.4 MG capsule 24 hr capsule  Commonly known as: FLOMAX   1 capsule, Oral, Daily      vitamin D 1.25 MG (32847 UT) capsule capsule  Commonly  known as: ERGOCALCIFEROL  Notes to patient: As directed   50,000 Units, Oral, Weekly         Stop These Medications    aspirin 81 MG chewable tablet     DULoxetine 30 MG capsule  Commonly known as: CYMBALTA     Eliquis 5 MG tablet tablet  Generic drug: apixaban            Discharge Diet: N.p.o. for transfer to tertiary center patient is scheduled for    Activity at Discharge: Patient will be transferred to [facility] for lower GI bleeding potential need for angiography and embolization.    Discharge Care Plan/Instructions: To be determined upon discharge from tertiary center    Follow-up Appointments:   No future appointments.  To be determined upon discharge from [facility] center    Test Results Pending at Discharge:   Pending Labs     Order Current Status    PREVIOUS HISTORY In process    Blood Culture - Blood, Arm, Left Preliminary result    Blood Culture - Blood, Hand, Right Preliminary result    Respiratory Culture - Sputum, Cough Preliminary result              Saeid Behroozi, MD   04/07/23   02:01 CDT    Time: Total time spent for discharge evaluation of this patient and arrangement for transfer was in excess of 60 minutes.

## 2023-04-07 NOTE — SIGNIFICANT NOTE
Pt with another BM, continues to have large amounts of blood and clots present. MD notified with orders to transfer pt to CCU. Wife at bedside. This nurse witnessed MD speaking with pt and wife about code status, verified wife as POA and pt verifies he wishes for CPR but DNI. Pt continues to c/o abd pain at this time, pt now c/o bilateral lower abd pain.

## 2023-04-07 NOTE — PROGRESS NOTES
"MD at night:  Late note:  Patient has been admitted to the hospital for respiratory failure currently on room air, and pulmonary embolism and is on Eliquis.  He received Eliquis last night prior to to start of GI bleeding.  He has been on antibiotic for pneumonia and on care for COPD  I was was informed by nursing staff that patient had episode of bloody bowel movement tonight.  Patient was immediately evaluated.  Patient reported mild nonspecific abdominal pain, his wife is at bedside.  He denied any nausea vomiting.  He had blood clot in the stool with bloody stool on examining.  Stat venous gas was ordered for evaluation of his H&H considering CBC machine is down.  Stat CBC was ordered.  Type and screen was ordered.  Transferred patient to intensive care unit and reevaluated patient in ICU.  2 L IV fluid boluses meanwhile was ordered during encounters.  Patient was placed on high rate IV fluid.  Placed patient on Protonix twice daily and gave 80 mg IV Protonix bolus for reassurance.  However his bleeding is most probably lower GI bleeding.  I ordered  stat CT of the abdomen and pelvis which showed \"1. There is some high density material within the lumen of the mid descending colon. It is possible this represents an active bleeding site as no additional  areas of high density stool are seen in the remainder of the colon. 2. The colon distal to this region is distended with fluid and stool, but not obstructed.\"   I contacted GI service Dr. Unger and discussed the case with Dr. Unger, his input greatly appreciated.  He  will provide GI service consultation in a.m.  He was hopeful that potential diverticular bleeding may stop on its own but he reported that if patient has persistent GI bleeding patient may require transfer to tertiary center for angiography and embolization.  I have reevaluated several times overnight.  Concerning ongoing GI bleeding 2 unit packed red blood cell ordered.  I informed patient and " his wife at bed about risks and benefits of packed red blood cell transfusion and patient and his wife agreed with packed red blood cell transfusion.  Concerning ongoing GI bleed I contacted transfer center and discussed the case with Dr. Terry at [facility].  She accepted  transfer of the patient to [facility] ICU for further care including potential need for  angiography and embolization.  Unfortunately at this time no bed is available at above facility for care. Dr Terry recommended looking at other places for faster transfer.  I contacted [facility] for arrangement of transfer to [facility] versus other facility for further care as above.  During my initial encounter I discussed with the patient and his wife at bedside advance directive and CODE STATUS.  Patient and his wife decided for CPR but no intubation.  CODE STATUS was adjusted to CPR with DO NOT INTUBATE.    Addendum:  I Have seen and examined patient multiple times through the night concerning his lower GI bleeding.  Patient hemodynamically stable following receiving fluid boluses and on high rate IV fluid.  Packed red blood cell ordered.  I discussed with the patient and his risk and benefits of transfer to tertiary center for care as above discussed.  Patient and his wife agreed with transfer upon availability of bed.  Total ICU time spent overnight for care of this patient on repeated evaluation was in excess of 60 minutes.

## 2023-04-07 NOTE — SIGNIFICANT NOTE
When getting pt off bedpan there was a very large amount of bloody stool present with clots. MD Behroozi notified and placed orders, orders carried out.

## 2023-04-07 NOTE — SIGNIFICANT NOTE
Pt BP decreased from first of shift at 98/59, MD notified, NS bolus ordered and administered at this time. Pt having another BM at this time. Large amount of blood and clots remain present, bedpan full. Pt now is c/o pain 6/10 to LLQ abd. MD aware.

## 2023-04-08 LAB
BACTERIA SPEC AEROBE CULT: NORMAL
BACTERIA SPEC AEROBE CULT: NORMAL
BACTERIA SPEC RESP CULT: NORMAL
GRAM STN SPEC: NORMAL

## 2023-04-22 ENCOUNTER — LAB REQUISITION (OUTPATIENT)
Dept: LAB | Facility: HOSPITAL | Age: 63
End: 2023-04-22
Payer: MEDICARE

## 2023-04-22 DIAGNOSIS — R68.89 OTHER GENERAL SYMPTOMS AND SIGNS: ICD-10-CM

## 2023-04-22 LAB
BASOPHILS # BLD AUTO: 0.08 10*3/MM3 (ref 0–0.2)
BASOPHILS NFR BLD AUTO: 1 % (ref 0–1.5)
DEPRECATED RDW RBC AUTO: 53.9 FL (ref 37–54)
EOSINOPHIL # BLD AUTO: 0.06 10*3/MM3 (ref 0–0.4)
EOSINOPHIL NFR BLD AUTO: 0.7 % (ref 0.3–6.2)
ERYTHROCYTE [DISTWIDTH] IN BLOOD BY AUTOMATED COUNT: 15.9 % (ref 12.3–15.4)
HCT VFR BLD AUTO: 29.9 % (ref 37.5–51)
HGB BLD-MCNC: 9.2 G/DL (ref 13–17.7)
IMM GRANULOCYTES # BLD AUTO: 0.04 10*3/MM3 (ref 0–0.05)
IMM GRANULOCYTES NFR BLD AUTO: 0.5 % (ref 0–0.5)
LYMPHOCYTES # BLD AUTO: 1.94 10*3/MM3 (ref 0.7–3.1)
LYMPHOCYTES NFR BLD AUTO: 23.2 % (ref 19.6–45.3)
MCH RBC QN AUTO: 28.7 PG (ref 26.6–33)
MCHC RBC AUTO-ENTMCNC: 30.8 G/DL (ref 31.5–35.7)
MCV RBC AUTO: 93.1 FL (ref 79–97)
MONOCYTES # BLD AUTO: 0.77 10*3/MM3 (ref 0.1–0.9)
MONOCYTES NFR BLD AUTO: 9.2 % (ref 5–12)
NEUTROPHILS NFR BLD AUTO: 5.46 10*3/MM3 (ref 1.7–7)
NEUTROPHILS NFR BLD AUTO: 65.4 % (ref 42.7–76)
NRBC BLD AUTO-RTO: 0 /100 WBC (ref 0–0.2)
PLATELET # BLD AUTO: 335 10*3/MM3 (ref 140–450)
PMV BLD AUTO: 11.5 FL (ref 6–12)
RBC # BLD AUTO: 3.21 10*6/MM3 (ref 4.14–5.8)
WBC NRBC COR # BLD: 8.35 10*3/MM3 (ref 3.4–10.8)

## 2023-04-22 PROCEDURE — 85025 COMPLETE CBC W/AUTO DIFF WBC: CPT | Performed by: FAMILY MEDICINE

## 2023-04-23 ENCOUNTER — HOSPITAL ENCOUNTER (OUTPATIENT)
Facility: HOSPITAL | Age: 63
Setting detail: OBSERVATION
Discharge: NURSING, REHAB OR LONG-TERM CARE | End: 2023-04-26
Attending: EMERGENCY MEDICINE | Admitting: INTERNAL MEDICINE
Payer: MEDICARE

## 2023-04-23 ENCOUNTER — APPOINTMENT (OUTPATIENT)
Dept: CT IMAGING | Facility: HOSPITAL | Age: 63
End: 2023-04-23
Payer: MEDICARE

## 2023-04-23 DIAGNOSIS — Z74.09 IMPAIRED FUNCTIONAL MOBILITY, BALANCE, GAIT, AND ENDURANCE: ICD-10-CM

## 2023-04-23 DIAGNOSIS — R07.9 CHEST PAIN, UNSPECIFIED TYPE: Primary | ICD-10-CM

## 2023-04-23 DIAGNOSIS — Z78.9 IMPAIRED MOBILITY AND ADLS: ICD-10-CM

## 2023-04-23 DIAGNOSIS — Z74.09 IMPAIRED MOBILITY AND ADLS: ICD-10-CM

## 2023-04-23 LAB
ALBUMIN SERPL-MCNC: 3.8 G/DL (ref 3.5–5.2)
ALBUMIN/GLOB SERPL: 1.6 G/DL
ALP SERPL-CCNC: 79 U/L (ref 39–117)
ALT SERPL W P-5'-P-CCNC: 34 U/L (ref 1–41)
ANION GAP SERPL CALCULATED.3IONS-SCNC: 10 MMOL/L (ref 5–15)
APTT PPP: 26.5 SECONDS (ref 20–40.3)
AST SERPL-CCNC: 42 U/L (ref 1–40)
BASOPHILS # BLD AUTO: 0.07 10*3/MM3 (ref 0–0.2)
BASOPHILS NFR BLD AUTO: 0.8 % (ref 0–1.5)
BILIRUB SERPL-MCNC: <0.2 MG/DL (ref 0–1.2)
BUN SERPL-MCNC: 13 MG/DL (ref 8–23)
BUN/CREAT SERPL: 17.8 (ref 7–25)
CALCIUM SPEC-SCNC: 8.9 MG/DL (ref 8.6–10.5)
CHLORIDE SERPL-SCNC: 103 MMOL/L (ref 98–107)
CO2 SERPL-SCNC: 27 MMOL/L (ref 22–29)
CREAT SERPL-MCNC: 0.73 MG/DL (ref 0.76–1.27)
DEPRECATED RDW RBC AUTO: 53.2 FL (ref 37–54)
EGFRCR SERPLBLD CKD-EPI 2021: 101.6 ML/MIN/1.73
EOSINOPHIL # BLD AUTO: 0.08 10*3/MM3 (ref 0–0.4)
EOSINOPHIL NFR BLD AUTO: 0.9 % (ref 0.3–6.2)
ERYTHROCYTE [DISTWIDTH] IN BLOOD BY AUTOMATED COUNT: 15.9 % (ref 12.3–15.4)
GLOBULIN UR ELPH-MCNC: 2.4 GM/DL
GLUCOSE SERPL-MCNC: 281 MG/DL (ref 65–99)
HCT VFR BLD AUTO: 32.2 % (ref 37.5–51)
HGB BLD-MCNC: 10.2 G/DL (ref 13–17.7)
HOLD SPECIMEN: NORMAL
IMM GRANULOCYTES # BLD AUTO: 0.05 10*3/MM3 (ref 0–0.05)
IMM GRANULOCYTES NFR BLD AUTO: 0.5 % (ref 0–0.5)
INR PPP: 0.95 (ref 0.8–1.2)
LYMPHOCYTES # BLD AUTO: 3.2 10*3/MM3 (ref 0.7–3.1)
LYMPHOCYTES NFR BLD AUTO: 34.3 % (ref 19.6–45.3)
MAGNESIUM SERPL-MCNC: 2 MG/DL (ref 1.6–2.4)
MCH RBC QN AUTO: 29.2 PG (ref 26.6–33)
MCHC RBC AUTO-ENTMCNC: 31.7 G/DL (ref 31.5–35.7)
MCV RBC AUTO: 92.3 FL (ref 79–97)
MONOCYTES # BLD AUTO: 0.94 10*3/MM3 (ref 0.1–0.9)
MONOCYTES NFR BLD AUTO: 10.1 % (ref 5–12)
NEUTROPHILS NFR BLD AUTO: 4.98 10*3/MM3 (ref 1.7–7)
NEUTROPHILS NFR BLD AUTO: 53.4 % (ref 42.7–76)
NRBC BLD AUTO-RTO: 0 /100 WBC (ref 0–0.2)
NT-PROBNP SERPL-MCNC: 52.5 PG/ML (ref 0–900)
PLATELET # BLD AUTO: 403 10*3/MM3 (ref 140–450)
PMV BLD AUTO: 9.9 FL (ref 6–12)
POTASSIUM SERPL-SCNC: 4.1 MMOL/L (ref 3.5–5.2)
PROT SERPL-MCNC: 6.2 G/DL (ref 6–8.5)
PROTHROMBIN TIME: 12.5 SECONDS (ref 11.1–15.3)
RBC # BLD AUTO: 3.49 10*6/MM3 (ref 4.14–5.8)
SODIUM SERPL-SCNC: 140 MMOL/L (ref 136–145)
TROPONIN T SERPL HS-MCNC: 17 NG/L
WBC NRBC COR # BLD: 9.32 10*3/MM3 (ref 3.4–10.8)

## 2023-04-23 PROCEDURE — 80053 COMPREHEN METABOLIC PANEL: CPT | Performed by: EMERGENCY MEDICINE

## 2023-04-23 PROCEDURE — 71275 CT ANGIOGRAPHY CHEST: CPT

## 2023-04-23 PROCEDURE — 80061 LIPID PANEL: CPT | Performed by: INTERNAL MEDICINE

## 2023-04-23 PROCEDURE — 25510000001 IOPAMIDOL PER 1 ML: Performed by: EMERGENCY MEDICINE

## 2023-04-23 PROCEDURE — 96361 HYDRATE IV INFUSION ADD-ON: CPT

## 2023-04-23 PROCEDURE — 83880 ASSAY OF NATRIURETIC PEPTIDE: CPT | Performed by: EMERGENCY MEDICINE

## 2023-04-23 PROCEDURE — 83735 ASSAY OF MAGNESIUM: CPT | Performed by: EMERGENCY MEDICINE

## 2023-04-23 PROCEDURE — 85025 COMPLETE CBC W/AUTO DIFF WBC: CPT | Performed by: EMERGENCY MEDICINE

## 2023-04-23 PROCEDURE — 99285 EMERGENCY DEPT VISIT HI MDM: CPT

## 2023-04-23 PROCEDURE — 93005 ELECTROCARDIOGRAM TRACING: CPT | Performed by: EMERGENCY MEDICINE

## 2023-04-23 PROCEDURE — 85730 THROMBOPLASTIN TIME PARTIAL: CPT | Performed by: EMERGENCY MEDICINE

## 2023-04-23 PROCEDURE — G0378 HOSPITAL OBSERVATION PER HR: HCPCS

## 2023-04-23 PROCEDURE — 85610 PROTHROMBIN TIME: CPT | Performed by: EMERGENCY MEDICINE

## 2023-04-23 PROCEDURE — 84484 ASSAY OF TROPONIN QUANT: CPT | Performed by: EMERGENCY MEDICINE

## 2023-04-23 PROCEDURE — 93005 ELECTROCARDIOGRAM TRACING: CPT

## 2023-04-23 PROCEDURE — 93010 ELECTROCARDIOGRAM REPORT: CPT | Performed by: INTERNAL MEDICINE

## 2023-04-23 PROCEDURE — 63710000001 INSULIN REGULAR HUMAN PER 5 UNITS: Performed by: EMERGENCY MEDICINE

## 2023-04-23 RX ORDER — SODIUM CHLORIDE 0.9 % (FLUSH) 0.9 %
10 SYRINGE (ML) INJECTION AS NEEDED
Status: DISCONTINUED | OUTPATIENT
Start: 2023-04-23 | End: 2023-04-26 | Stop reason: HOSPADM

## 2023-04-23 RX ORDER — NITROGLYCERIN 0.4 MG/1
0.4 TABLET SUBLINGUAL
Status: DISCONTINUED | OUTPATIENT
Start: 2023-04-23 | End: 2023-04-26 | Stop reason: HOSPADM

## 2023-04-23 RX ORDER — HEPARIN SODIUM 5000 [USP'U]/ML
80 INJECTION, SOLUTION INTRAVENOUS; SUBCUTANEOUS AS NEEDED
Status: DISCONTINUED | OUTPATIENT
Start: 2023-04-23 | End: 2023-04-24

## 2023-04-23 RX ORDER — HEPARIN SODIUM 10000 [USP'U]/100ML
18 INJECTION, SOLUTION INTRAVENOUS
Status: DISCONTINUED | OUTPATIENT
Start: 2023-04-23 | End: 2023-04-24

## 2023-04-23 RX ORDER — SODIUM CHLORIDE 9 MG/ML
125 INJECTION, SOLUTION INTRAVENOUS CONTINUOUS
Status: DISCONTINUED | OUTPATIENT
Start: 2023-04-23 | End: 2023-04-25

## 2023-04-23 RX ORDER — HEPARIN SODIUM 5000 [USP'U]/ML
40 INJECTION, SOLUTION INTRAVENOUS; SUBCUTANEOUS AS NEEDED
Status: DISCONTINUED | OUTPATIENT
Start: 2023-04-23 | End: 2023-04-24

## 2023-04-23 RX ORDER — ASPIRIN 81 MG/1
324 TABLET, CHEWABLE ORAL ONCE
Status: COMPLETED | OUTPATIENT
Start: 2023-04-23 | End: 2023-04-23

## 2023-04-23 RX ADMIN — ASPIRIN 324 MG: 81 TABLET, CHEWABLE ORAL at 21:51

## 2023-04-23 RX ADMIN — IOPAMIDOL 60 ML: 755 INJECTION, SOLUTION INTRAVENOUS at 22:00

## 2023-04-23 RX ADMIN — SODIUM CHLORIDE 125 ML/HR: 9 INJECTION, SOLUTION INTRAVENOUS at 21:51

## 2023-04-23 RX ADMIN — HUMAN INSULIN 2 UNITS: 100 INJECTION, SOLUTION SUBCUTANEOUS at 23:37

## 2023-04-23 NOTE — Clinical Note
Level of Care: Telemetry [5]   Diagnosis: Chest pain, unspecified type [[id number]]   Admitting Physician: MANOHAR HESS [[id number]]   Attending Physician: MANOHAR HESS [[id number]]

## 2023-04-24 ENCOUNTER — APPOINTMENT (OUTPATIENT)
Dept: ULTRASOUND IMAGING | Facility: HOSPITAL | Age: 63
End: 2023-04-24
Payer: MEDICARE

## 2023-04-24 ENCOUNTER — APPOINTMENT (OUTPATIENT)
Dept: CT IMAGING | Facility: HOSPITAL | Age: 63
End: 2023-04-24
Payer: MEDICARE

## 2023-04-24 LAB
ANION GAP SERPL CALCULATED.3IONS-SCNC: 11 MMOL/L (ref 5–15)
BUN SERPL-MCNC: 12 MG/DL (ref 8–23)
BUN/CREAT SERPL: 22.2 (ref 7–25)
CALCIUM SPEC-SCNC: 9 MG/DL (ref 8.6–10.5)
CHLORIDE SERPL-SCNC: 106 MMOL/L (ref 98–107)
CHOLEST SERPL-MCNC: 138 MG/DL (ref 0–200)
CO2 SERPL-SCNC: 23 MMOL/L (ref 22–29)
CRE SCREEN PCR: NOT DETECTED
CREAT SERPL-MCNC: 0.54 MG/DL (ref 0.76–1.27)
EGFRCR SERPLBLD CKD-EPI 2021: 111.3 ML/MIN/1.73
GEN 5 2HR TROPONIN T REFLEX: 18 NG/L
GLUCOSE BLDC GLUCOMTR-MCNC: 281 MG/DL (ref 70–130)
GLUCOSE SERPL-MCNC: 215 MG/DL (ref 65–99)
HDLC SERPL-MCNC: 52 MG/DL (ref 40–60)
HOLD SPECIMEN: NORMAL
IMP STRAIN: NOT DETECTED
KPC STRAIN: NOT DETECTED
LDLC SERPL CALC-MCNC: 67 MG/DL (ref 0–100)
LDLC/HDLC SERPL: 1.27 {RATIO}
NDM STRAIN: NOT DETECTED
OXA 48 STRAIN: NOT DETECTED
POTASSIUM SERPL-SCNC: 4 MMOL/L (ref 3.5–5.2)
QT INTERVAL: 392 MS
QTC INTERVAL: 443 MS
SODIUM SERPL-SCNC: 140 MMOL/L (ref 136–145)
TRIGL SERPL-MCNC: 101 MG/DL (ref 0–150)
TROPONIN T DELTA: 1 NG/L
TROPONIN T SERPL HS-MCNC: 18 NG/L
VIM STRAIN: NOT DETECTED
VLDLC SERPL-MCNC: 19 MG/DL (ref 5–40)
WHOLE BLOOD HOLD SPECIMEN: NORMAL

## 2023-04-24 PROCEDURE — 96374 THER/PROPH/DIAG INJ IV PUSH: CPT

## 2023-04-24 PROCEDURE — 25010000002 ENOXAPARIN PER 10 MG: Performed by: INTERNAL MEDICINE

## 2023-04-24 PROCEDURE — 75574 CT ANGIO HRT W/3D IMAGE: CPT

## 2023-04-24 PROCEDURE — 63710000001 INSULIN ASPART PER 5 UNITS: Performed by: INTERNAL MEDICINE

## 2023-04-24 PROCEDURE — 96375 TX/PRO/DX INJ NEW DRUG ADDON: CPT

## 2023-04-24 PROCEDURE — 63710000001 INSULIN ASPART PER 5 UNITS: Performed by: NURSE PRACTITIONER

## 2023-04-24 PROCEDURE — 25010000002 CYANOCOBALAMIN PER 1000 MCG: Performed by: INTERNAL MEDICINE

## 2023-04-24 PROCEDURE — 36415 COLL VENOUS BLD VENIPUNCTURE: CPT | Performed by: INTERNAL MEDICINE

## 2023-04-24 PROCEDURE — G0378 HOSPITAL OBSERVATION PER HR: HCPCS

## 2023-04-24 PROCEDURE — 99222 1ST HOSP IP/OBS MODERATE 55: CPT | Performed by: INTERNAL MEDICINE

## 2023-04-24 PROCEDURE — 25010000002 MORPHINE PER 10 MG: Performed by: INTERNAL MEDICINE

## 2023-04-24 PROCEDURE — 97162 PT EVAL MOD COMPLEX 30 MIN: CPT

## 2023-04-24 PROCEDURE — 87081 CULTURE SCREEN ONLY: CPT | Performed by: INTERNAL MEDICINE

## 2023-04-24 PROCEDURE — 80048 BASIC METABOLIC PNL TOTAL CA: CPT | Performed by: INTERNAL MEDICINE

## 2023-04-24 PROCEDURE — 84484 ASSAY OF TROPONIN QUANT: CPT | Performed by: INTERNAL MEDICINE

## 2023-04-24 PROCEDURE — 93010 ELECTROCARDIOGRAM REPORT: CPT | Performed by: INTERNAL MEDICINE

## 2023-04-24 PROCEDURE — 96361 HYDRATE IV INFUSION ADD-ON: CPT

## 2023-04-24 PROCEDURE — 63710000001 INSULIN DETEMIR PER 5 UNITS: Performed by: INTERNAL MEDICINE

## 2023-04-24 PROCEDURE — 96372 THER/PROPH/DIAG INJ SC/IM: CPT

## 2023-04-24 PROCEDURE — 97166 OT EVAL MOD COMPLEX 45 MIN: CPT

## 2023-04-24 PROCEDURE — 82962 GLUCOSE BLOOD TEST: CPT

## 2023-04-24 PROCEDURE — 93970 EXTREMITY STUDY: CPT

## 2023-04-24 PROCEDURE — 93005 ELECTROCARDIOGRAM TRACING: CPT | Performed by: NURSE PRACTITIONER

## 2023-04-24 PROCEDURE — 25510000001 IOPAMIDOL PER 1 ML: Performed by: INTERNAL MEDICINE

## 2023-04-24 RX ORDER — ONDANSETRON 2 MG/ML
4 INJECTION INTRAMUSCULAR; INTRAVENOUS EVERY 6 HOURS PRN
Status: DISCONTINUED | OUTPATIENT
Start: 2023-04-24 | End: 2023-04-26 | Stop reason: HOSPADM

## 2023-04-24 RX ORDER — NITROGLYCERIN 0.4 MG/1
0.4 TABLET SUBLINGUAL ONCE
Status: COMPLETED | OUTPATIENT
Start: 2023-04-24 | End: 2023-04-24

## 2023-04-24 RX ORDER — METOPROLOL SUCCINATE 25 MG
12.5 TABLET, EXTENDED RELEASE 24 HR ORAL
Status: DISCONTINUED | OUTPATIENT
Start: 2023-04-24 | End: 2023-04-26 | Stop reason: HOSPADM

## 2023-04-24 RX ORDER — ACETAMINOPHEN 160 MG/5ML
650 SOLUTION ORAL EVERY 4 HOURS PRN
Status: DISCONTINUED | OUTPATIENT
Start: 2023-04-24 | End: 2023-04-26 | Stop reason: HOSPADM

## 2023-04-24 RX ORDER — NICOTINE POLACRILEX 4 MG
15 LOZENGE BUCCAL
Status: DISCONTINUED | OUTPATIENT
Start: 2023-04-24 | End: 2023-04-26 | Stop reason: HOSPADM

## 2023-04-24 RX ORDER — ASPIRIN 81 MG/1
81 TABLET, CHEWABLE ORAL DAILY
COMMUNITY

## 2023-04-24 RX ORDER — CALCIUM CARBONATE 200(500)MG
1 TABLET,CHEWABLE ORAL 2 TIMES DAILY PRN
Status: DISCONTINUED | OUTPATIENT
Start: 2023-04-24 | End: 2023-04-26 | Stop reason: HOSPADM

## 2023-04-24 RX ORDER — TAMSULOSIN HYDROCHLORIDE 0.4 MG/1
0.4 CAPSULE ORAL DAILY
Status: DISCONTINUED | OUTPATIENT
Start: 2023-04-24 | End: 2023-04-26 | Stop reason: HOSPADM

## 2023-04-24 RX ORDER — FLUTICASONE PROPIONATE 50 MCG
1 SPRAY, SUSPENSION (ML) NASAL DAILY
Status: DISCONTINUED | OUTPATIENT
Start: 2023-04-24 | End: 2023-04-26 | Stop reason: HOSPADM

## 2023-04-24 RX ORDER — HYDROXYZINE PAMOATE 25 MG/1
25 CAPSULE ORAL 3 TIMES DAILY PRN
Status: DISCONTINUED | OUTPATIENT
Start: 2023-04-24 | End: 2023-04-26 | Stop reason: HOSPADM

## 2023-04-24 RX ORDER — DULOXETIN HYDROCHLORIDE 30 MG/1
30 CAPSULE, DELAYED RELEASE ORAL NIGHTLY
COMMUNITY

## 2023-04-24 RX ORDER — PANTOPRAZOLE SODIUM 40 MG/1
40 TABLET, DELAYED RELEASE ORAL
Status: DISCONTINUED | OUTPATIENT
Start: 2023-04-24 | End: 2023-04-26 | Stop reason: HOSPADM

## 2023-04-24 RX ORDER — INSULIN ASPART 100 [IU]/ML
0-24 INJECTION, SOLUTION INTRAVENOUS; SUBCUTANEOUS
Status: DISCONTINUED | OUTPATIENT
Start: 2023-04-24 | End: 2023-04-26 | Stop reason: HOSPADM

## 2023-04-24 RX ORDER — INSULIN ASPART 100 [IU]/ML
0-14 INJECTION, SOLUTION INTRAVENOUS; SUBCUTANEOUS
Status: DISCONTINUED | OUTPATIENT
Start: 2023-04-24 | End: 2023-04-24

## 2023-04-24 RX ORDER — DEXTROSE MONOHYDRATE 25 G/50ML
25 INJECTION, SOLUTION INTRAVENOUS
Status: DISCONTINUED | OUTPATIENT
Start: 2023-04-24 | End: 2023-04-26 | Stop reason: HOSPADM

## 2023-04-24 RX ORDER — SODIUM CHLORIDE 0.9 % (FLUSH) 0.9 %
10 SYRINGE (ML) INJECTION EVERY 12 HOURS SCHEDULED
Status: DISCONTINUED | OUTPATIENT
Start: 2023-04-24 | End: 2023-04-26 | Stop reason: HOSPADM

## 2023-04-24 RX ORDER — GLUCAGON 1 MG/ML
1 KIT INJECTION
Status: DISCONTINUED | OUTPATIENT
Start: 2023-04-24 | End: 2023-04-26 | Stop reason: HOSPADM

## 2023-04-24 RX ORDER — NALOXONE HCL 0.4 MG/ML
0.4 VIAL (ML) INJECTION
Status: DISCONTINUED | OUTPATIENT
Start: 2023-04-24 | End: 2023-04-26 | Stop reason: HOSPADM

## 2023-04-24 RX ORDER — SODIUM CHLORIDE 0.9 % (FLUSH) 0.9 %
10 SYRINGE (ML) INJECTION AS NEEDED
Status: DISCONTINUED | OUTPATIENT
Start: 2023-04-23 | End: 2023-04-26 | Stop reason: HOSPADM

## 2023-04-24 RX ORDER — MORPHINE SULFATE 2 MG/ML
2 INJECTION, SOLUTION INTRAMUSCULAR; INTRAVENOUS EVERY 4 HOURS PRN
Status: DISCONTINUED | OUTPATIENT
Start: 2023-04-24 | End: 2023-04-26 | Stop reason: HOSPADM

## 2023-04-24 RX ORDER — ENOXAPARIN SODIUM 100 MG/ML
40 INJECTION SUBCUTANEOUS EVERY 24 HOURS
Status: DISCONTINUED | OUTPATIENT
Start: 2023-04-24 | End: 2023-04-26 | Stop reason: HOSPADM

## 2023-04-24 RX ORDER — NITROGLYCERIN 0.4 MG/1
TABLET SUBLINGUAL
Status: COMPLETED
Start: 2023-04-24 | End: 2023-04-24

## 2023-04-24 RX ORDER — CYANOCOBALAMIN 1000 UG/ML
1000 INJECTION, SOLUTION INTRAMUSCULAR; SUBCUTANEOUS
Status: DISCONTINUED | OUTPATIENT
Start: 2023-04-24 | End: 2023-04-26 | Stop reason: HOSPADM

## 2023-04-24 RX ORDER — ACETAMINOPHEN 650 MG/1
650 SUPPOSITORY RECTAL EVERY 4 HOURS PRN
Status: DISCONTINUED | OUTPATIENT
Start: 2023-04-24 | End: 2023-04-26 | Stop reason: HOSPADM

## 2023-04-24 RX ORDER — SODIUM CHLORIDE 9 MG/ML
40 INJECTION, SOLUTION INTRAVENOUS AS NEEDED
Status: DISCONTINUED | OUTPATIENT
Start: 2023-04-23 | End: 2023-04-26 | Stop reason: HOSPADM

## 2023-04-24 RX ORDER — ACETAMINOPHEN 325 MG/1
650 TABLET ORAL EVERY 4 HOURS PRN
Status: DISCONTINUED | OUTPATIENT
Start: 2023-04-24 | End: 2023-04-26 | Stop reason: HOSPADM

## 2023-04-24 RX ORDER — METOPROLOL TARTRATE 5 MG/5ML
2.5 INJECTION INTRAVENOUS ONCE
Status: COMPLETED | OUTPATIENT
Start: 2023-04-24 | End: 2023-04-24

## 2023-04-24 RX ADMIN — PANTOPRAZOLE SODIUM 40 MG: 40 TABLET, DELAYED RELEASE ORAL at 05:17

## 2023-04-24 RX ADMIN — MORPHINE SULFATE 2 MG: 2 INJECTION, SOLUTION INTRAMUSCULAR; INTRAVENOUS at 22:10

## 2023-04-24 RX ADMIN — Medication 10 ML: at 05:34

## 2023-04-24 RX ADMIN — INSULIN ASPART 4 UNITS: 100 INJECTION, SOLUTION INTRAVENOUS; SUBCUTANEOUS at 17:14

## 2023-04-24 RX ADMIN — INSULIN ASPART 14 UNITS: 100 INJECTION, SOLUTION INTRAVENOUS; SUBCUTANEOUS at 10:35

## 2023-04-24 RX ADMIN — INSULIN ASPART 8 UNITS: 100 INJECTION, SOLUTION INTRAVENOUS; SUBCUTANEOUS at 08:39

## 2023-04-24 RX ADMIN — NITROGLYCERIN 0.4 MG: 0.4 TABLET, ORALLY DISINTEGRATING SUBLINGUAL at 17:17

## 2023-04-24 RX ADMIN — CYANOCOBALAMIN 1000 MCG: 1000 INJECTION, SOLUTION INTRAMUSCULAR at 08:39

## 2023-04-24 RX ADMIN — TAMSULOSIN HYDROCHLORIDE 0.4 MG: 0.4 CAPSULE ORAL at 08:39

## 2023-04-24 RX ADMIN — SODIUM CHLORIDE 125 ML/HR: 9 INJECTION, SOLUTION INTRAVENOUS at 17:15

## 2023-04-24 RX ADMIN — HYDROXYZINE PAMOATE 25 MG: 25 CAPSULE ORAL at 10:56

## 2023-04-24 RX ADMIN — SODIUM CHLORIDE 125 ML/HR: 9 INJECTION, SOLUTION INTRAVENOUS at 05:33

## 2023-04-24 RX ADMIN — INSULIN DETEMIR 20 UNITS: 100 INJECTION, SOLUTION SUBCUTANEOUS at 20:40

## 2023-04-24 RX ADMIN — Medication 10 ML: at 20:41

## 2023-04-24 RX ADMIN — INSULIN ASPART 6 UNITS: 100 INJECTION, SOLUTION INTRAVENOUS; SUBCUTANEOUS at 10:56

## 2023-04-24 RX ADMIN — NITROGLYCERIN 0.4 MG: 0.4 TABLET, ORALLY DISINTEGRATING SUBLINGUAL at 12:37

## 2023-04-24 RX ADMIN — Medication 400 MG: at 08:40

## 2023-04-24 RX ADMIN — HYDROXYZINE PAMOATE 25 MG: 25 CAPSULE ORAL at 20:40

## 2023-04-24 RX ADMIN — CANAGLIFLOZIN 100 MG: 100 TABLET, FILM COATED ORAL at 08:39

## 2023-04-24 RX ADMIN — ENOXAPARIN SODIUM 40 MG: 40 INJECTION SUBCUTANEOUS at 08:39

## 2023-04-24 RX ADMIN — METOPROLOL TARTRATE 2.5 MG: 5 INJECTION INTRAVENOUS at 10:35

## 2023-04-24 RX ADMIN — IOPAMIDOL 90 ML: 755 INJECTION, SOLUTION INTRAVENOUS at 12:45

## 2023-04-24 RX ADMIN — FLUTICASONE PROPIONATE 1 SPRAY: 50 SPRAY, METERED NASAL at 13:35

## 2023-04-24 RX ADMIN — NITROGLYCERIN 0.4 MG: 0.4 TABLET SUBLINGUAL at 12:37

## 2023-04-24 RX ADMIN — Medication 12.5 MG: at 08:40

## 2023-04-24 NOTE — THERAPY EVALUATION
Patient Name: Sandoval Navarro  : 1960    MRN: [id number]                              Today's Date: 2023       Admit Date: 2023    Visit Dx:     ICD-10-CM ICD-9-CM   1. Chest pain, unspecified type  R07.9 786.50   2. Impaired mobility and ADLs  Z74.09 V49.89    Z78.9    3. Impaired functional mobility, balance, gait, and endurance  Z74.09 V49.89     Patient Active Problem List   Diagnosis   • Leukocytosis   • Chest pain   • Dyspnea on exertion   • Chronic fatigue   • PVD (peripheral vascular disease) with claudication   • Cigarette nicotine dependence, uncomplicated   • Overweight (BMI 25.0-29.9)   • Chronic obstructive pulmonary disease   • Carotid stenosis, asymptomatic, right   • ALICE (obstructive sleep apnea)   • Hyperlipemia   • Multiple subsegmental pulmonary emboli without acute cor pulmonale   • Acute on chronic respiratory failure with hypoxia and hypercapnia   • Chest pain, unspecified type     Past Medical History:   Diagnosis Date   • COPD (chronic obstructive pulmonary disease)    • Diabetes mellitus    • GERD (gastroesophageal reflux disease)    • Gout    • History of transfusion    • Hyperlipemia    • Sleep apnea    • Stroke      Past Surgical History:   Procedure Laterality Date   • BACK SURGERY     • CHOLECYSTECTOMY     • COLONOSCOPY N/A 3/14/2017    Procedure: COLONOSCOPY;  Surgeon: Cachorro Perez DO;  Location: [facility];  Service:    • HAND SURGERY     • LEG SURGERY Right    • NOSE SURGERY     • PANCREAS SURGERY     • SPLENECTOMY        General Information     Row Name 23 1313          Physical Therapy Time and Intention    Document Type evaluation  -MARIS     Mode of Treatment occupational therapy;physical therapy  -MARIS     Row Name 23 1313          General Information    Patient Profile Reviewed yes  -MARIS     Prior Level of Function mod assist:;gait;transfer;bed mobility;dressing;independent:;feeding;grooming  -MARIS     Existing Precautions/Restrictions fall   -MARIS     Barriers to Rehab medically complex;previous functional deficit;cognitive status  -MARIS     Row Name 04/24/23 1313          Living Environment    People in Home facility resident  [facility]  -MARIS     Row Name 04/24/23 1313          Stairs Within Home, Primary    Stairs, Within Home, Primary Reports has been in bed a good bit; has hospital bed at [facility] has been in w/c uses mostly LE's to propel. Pt using what sounds like a hemiwalker at [facility] with therapy.  -MARIS     Row Name 04/24/23 1313          Cognition    Orientation Status (Cognition) oriented to;person;place;situation;other (see comments)  thought current month was May  -MARIS     Row Name 04/24/23 1313          Safety Issues, Functional Mobility    Impairments Affecting Function (Mobility) balance;cognition;endurance/activity tolerance;range of motion (ROM);strength;pain;grasp  -MARIS     Cognitive Impairments, Mobility Safety/Performance insight into deficits/self-awareness;safety precaution awareness;problem-solving/reasoning;judgment  -MARIS           User Key  (r) = Recorded By, (t) = Taken By, (c) = Cosigned By    Initials Name Provider Type    MARIS Judith Yee, PT Physical Therapist               Mobility     Row Name 04/24/23 1313          Bed Mobility    Bed Mobility supine-sit;sit-supine  -MARIS     Supine-Sit [place] (Bed Mobility) standby assist  -MARIS     Sit-Supine [place] (Bed Mobility) minimum assist (75% patient effort)  -MARIS     Assistive Device (Bed Mobility) bed rails;head of bed elevated  -MARIS     Row Name 04/24/23 1313          Sit-Stand Transfer    Sit-Stand [place] (Transfers) minimum assist (75% patient effort);2 person assist  -     Row Name 04/24/23 1313          Gait/Stairs (Locomotion)    [place] Level (Gait) minimum assist (75% patient effort);2 person assist;nonverbal cues (demo/gesture);verbal cues  -MARIS     Distance in Feet (Gait) 4ft to HOB  -MARIS     Deviations/Abnormal Patterns (Gait) stride length decreased  -MARIS     Left  Sided Gait Deviations knee buckling, left side  -           User Key  (r) = Recorded By, (t) = Taken By, (c) = Cosigned By    Initials Name Provider Type    Judith Murcia PT Physical Therapist               Obj/Interventions     Row Name 04/24/23 1313          Range of Motion Comprehensive    Comment, General Range of Motion BLE: RLE; AROM WFL ; LLE: AROM WFL ankle/foot, some hypertonicity noted proximally requiring AAROM in supine  -     Row Name 04/24/23 1313          Strength Comprehensive (MMT)    Comment, General Manual Muscle Testing (MMT) Assessment BLE: RLE: 4/5 grossly LLE: 4-/5 ankle/foot, 3+/5 knee, 3/5 hip  -     Row Name 04/24/23 1313          Balance    Balance Assessment sitting static balance;sitting dynamic balance;sit to stand dynamic balance;standing static balance;standing dynamic balance  -     Static Sitting Balance standby assist  -     Dynamic Sitting Balance contact guard  -     Position, Sitting Balance sitting edge of bed  -     Sit to Stand Dynamic Balance minimal assist;2-person assist  -     Static Standing Balance minimal assist  -     Dynamic Standing Balance 2-person assist  -[facility] Name 04/24/23 1313          Sensory Assessment (Somatosensory)    Sensory Assessment (Somatosensory) sensation intact  to light touch  -           User Key  (r) = Recorded By, (t) = Taken By, (c) = Cosigned By    Initials Name Provider Type    Judith Murcia PT Physical Therapist               Goals/Plan     Row Name 04/24/23 1313          Bed Mobility Goal 1 (PT)    Activity/Assistive Device (Bed Mobility Goal 1, PT) sit to supine;supine to sit  -     [place] Level/Cues Needed (Bed Mobility Goal 1, PT) modified independence;independent  -     Time Frame (Bed Mobility Goal 1, PT) by discharge  -     Progress/Outcomes (Bed Mobility Goal 1, PT) goal not met  -     Row Name 04/24/23 1313          Transfer Goal 1 (PT)    Activity/Assistive Device (Transfer Goal 1,  PT) sit-to-stand/stand-to-sit;bed-to-chair/chair-to-bed;walker, bryanna  -MARIS     [place] Level/Cues Needed (Transfer Goal 1, PT) contact guard required;standby assist  -MARIS     Time Frame (Transfer Goal 1, PT) by discharge  -MARIS     Progress/Outcome (Transfer Goal 1, PT) goal not met  -MARIS     Row Name 04/24/23 1313          Gait Training Goal 1 (PT)    Activity/Assistive Device (Gait Training Goal 1, PT) gait (walking locomotion);decrease fall risk;increase endurance/gait distance;walker, bryanna  -MARIS     Distance (Gait Training Goal 1, PT) 50ft or more each trip  -MARIS     Time Frame (Gait Training Goal 1, PT) by discharge  -MARIS     Progress/Outcome (Gait Training Goal 1, PT) goal not met  -MARIS     Row Name 04/24/23 1313          ROM Goal 1 (PT)    ROM Goal 1 (PT) Pt will tolerate LE exercises OOB in chair with VSS  -MARIS     Time Frame (ROM Goal 1, PT) by discharge  -MARIS     Progress/Outcome (ROM Goal 1, PT) goal not met  -     Row Name 04/24/23 1313          Therapy Assessment/Plan (PT)    Planned Therapy Interventions (PT) balance training;bed mobility training;gait training;home exercise program;patient/family education;ROM (range of motion);neuromuscular re-education;postural re-education;strengthening;stretching;transfer training;wheelchair management/propulsion training  -           User Key  (r) = Recorded By, (t) = Taken By, (c) = Cosigned By    Initials Name Provider Type    Judith Murcia, PT Physical Therapist               Clinical Impression     Row Name 04/24/23 1313          Pain    Pretreatment Pain Rating 6/10  -MARIS     Posttreatment Pain Rating 6/10  -     Pain Location - chest  -MARIS     Pre/Posttreatment Pain Comment reports he still has a litlle pressure in chest  -     Pain Intervention(s) Rest;Repositioned  -     Additional Documentation Pain Scale: Numbers Pre/Post-Treatment (Group)  -     Row Name 04/24/23 1313          Plan of Care Review    Plan of Care Reviewed With patient  -MARIS      Outcome Evaluation PT evaluation completed as co-eval with OT. Pt finishing lunch upon arrival, but ageeable to therapy.  AROM WFL RLE and L ankle/foot, some hypertonicity proximally requiring active-assistive range L knee, hip; Strength 4/5 grossly RLE; 3+/5 LLE distally and 3/5 proximally. Patient transferred supine to sit with min assistance and sit to supine with SBA.Patient transferred sit to stand to sit without device with min assistance of 2. Patient side stepped to HOB 5ft with min assistance of 2. Noted L knee buckling with increased time required to advance LLE. Function limited by decreased strength, balance, and tolerance for functional mobility and activities. Patient will benefit from PT to improve strength, balance, and tolerance to improve transfer and gait abiity and to decrease fall risk. Anticipate SNF at discharge with continued rehab.  -     Row Name 04/24/23 1313          Therapy Assessment/Plan (PT)    Patient/Family Therapy Goals Statement (PT) improve mobility  -     Rehab Potential (PT) good, to achieve stated therapy goals  -     Criteria for Skilled Interventions Met (PT) yes;meets criteria;skilled treatment is necessary  -     Therapy Frequency (PT) other (see comments)  5-7 days/wk  -     Predicted Duration of Therapy Intervention (PT) until discharge or goals met  -     Row Name 04/24/23 1313          Vital Signs    Pre Systolic BP Rehab 90  -MARIS     Pre Treatment Diastolic BP 53  -MARIS     Intra Systolic BP Rehab 102  -MARIS     Intra Treatment Diastolic BP 55  -MARIS     Post Systolic BP Rehab 103  -MARIS     Post Treatment Diastolic BP 60  -MARIS     Pretreatment Heart Rate (beats/min) 80  -MARIS     Intratreatment Heart Rate (beats/min) 88  -MARIS     Posttreatment Heart Rate (beats/min) 78  -MARIS     Pre SpO2 (%) 95  -MARIS     O2 Delivery Pre Treatment room air  -MARIS     Post SpO2 (%) 97  -MARIS     O2 Delivery Post Treatment room air  -MARIS     Pre Patient Position Supine  -MARIS     Intra Patient  Position Sitting  -     Post Patient Position Supine  -     Row Name 04/24/23 1313          Positioning and Restraints    Pre-Treatment Position in bed  -     Post Treatment Position bed  -     In Bed fowlers;call light within reach;encouraged to call for assist;exit alarm on  -           User Key  (r) = Recorded By, (t) = Taken By, (c) = Cosigned By    Initials Name Provider Type    Judith Murcia, PT Physical Therapist               Outcome Measures     Row Name 04/24/23 1313 04/24/23 0839       How much help from another person do you currently need...    Turning from your back to your side while in flat bed without using bedrails? 3  -MARIS 3  -MH    Moving from lying on back to sitting on the side of a flat bed without bedrails? 2  -MARIS 2  -MH    Moving to and from a bed to a chair (including a wheelchair)? 3  -MARIS 2  -MH    Standing up from a chair using your arms (e.g., wheelchair, bedside chair)? 3  -MARIS 2  -MH    Climbing 3-5 steps with a railing? 2  -MARIS 2  -MH    To walk in hospital room? 2  -MARIS 2  -MH    AM-PAC 6 Clicks Score (PT) 15  - 13  -    Highest level of mobility 4 --> Transferred to chair/commode  - 4 --> Transferred to chair/commode  -    Row Name 04/24/23 1313 04/24/23 1312       Functional Assessment    Outcome Measure Options AM-PAC 6 Clicks Basic Mobility (PT)  - AM-PAC 6 Clicks Daily Activity (OT)  -          User Key  (r) = Recorded By, (t) = Taken By, (c) = Cosigned By    Initials Name Provider Type    Judith Murcia, PT Physical Therapist     Eric Prater, RN Registered Nurse    Alan Velazquez OT Occupational Therapist                             Physical Therapy Education     Title: PT OT SLP Therapies (In Progress)     Topic: Physical Therapy (In Progress)     Point: Mobility training (In Progress)     Learning Progress Summary           Patient Acceptance, E, NR by MARIS at 4/24/2023 3332                   Point: Home exercise program (Not Started)      Learner Progress:  Not documented in this visit.          Point: Body mechanics (In Progress)     Learning Progress Summary           Patient Acceptance, E, NR by  at 4/24/2023 1525                   Point: Precautions (In Progress)     Learning Progress Summary           Patient Acceptance, E, NR by MARIS at 4/24/2023 1525                               User Key     Initials Effective Dates Name Provider Type Discipline     06/16/21 -  Judith Yee, PT Physical Therapist PT              PT Recommendation and Plan  Planned Therapy Interventions (PT): balance training, bed mobility training, gait training, home exercise program, patient/family education, ROM (range of motion), neuromuscular re-education, postural re-education, strengthening, stretching, transfer training, wheelchair management/propulsion training  Plan of Care Reviewed With: patient  Outcome Evaluation: PT evaluation completed as co-eval with OT. Pt finishing lunch upon arrival, but ageeable to therapy.  AROM WFL RLE and L ankle/foot, some hypertonicity proximally requiring active-assistive range L knee, hip; Strength 4/5 grossly RLE; 3+/5 LLE distally and 3/5 proximally. Patient transferred supine to sit with min assistance and sit to supine with SBA.Patient transferred sit to stand to sit without device with min assistance of 2. Patient side stepped to HOB 5ft with min assistance of 2. Noted L knee buckling with increased time required to advance LLE. Function limited by decreased strength, balance, and tolerance for functional mobility and activities. Patient will benefit from PT to improve strength, balance, and tolerance to improve transfer and gait abiity and to decrease fall risk. Anticipate SNF at discharge with continued rehab.     Time Calculation:    PT Charges     Row Name 04/24/23 1526             Time Calculation    Start Time 1312  -MARIS      Stop Time 1351  -MARIS      Time Calculation (min) 39 min  -MARIS      PT Received On 04/24/23  -MARIS       PT Goal Re-Cert Due Date 05/07/23  -MARIS         Time Calculation- PT    Total Timed Code Minutes- PT 0 minute(s)  -MARIS         Untimed Charges    PT Eval/Re-eval Minutes 39  -MARIS         Total Minutes    Untimed Charges Total Minutes 39  -MARIS       Total Minutes 39  -MARIS            User Key  (r) = Recorded By, (t) = Taken By, (c) = Cosigned By    Initials Name Provider Type    MARIS Judith Yee, PT Physical Therapist              Therapy Charges for Today     Code Description Service Date Service Provider Modifiers Qty    70500117442 HC PT EVAL MOD COMPLEXITY 3 4/24/2023 Judith Yee, PT GP 1          PT G-Codes  Outcome Measure Options: AM-PAC 6 Clicks Basic Mobility (PT)  AM-PAC 6 Clicks Score (PT): 15  AM-PAC 6 Clicks Score (OT): 15  PT Discharge Summary  Anticipated Discharge Disposition (PT): extended care facility, skilled nursing facility    Judith Yee, PT  4/24/2023

## 2023-04-24 NOTE — PLAN OF CARE
Problem: Adult Inpatient Plan of Care  Goal: Plan of Care Review  Recent Flowsheet Documentation  Taken 4/24/2023 1313 by Judith Yee PT  Plan of Care Reviewed With: patient  Outcome Evaluation:   PT evaluation completed as co-eval with OT. Pt finishing lunch upon arrival, but ageeable to therapy.  AROM WFL RLE and L ankle/foot, some hypertonicity proximally requiring active-assistive range L knee, hip   Strength 4/5 grossly RLE   3+/5 LLE distally and 3/5 proximally. Patient transferred supine to sit with min assistance and sit to supine with SBA.Patient transferred sit to stand to sit without device with min assistance of 2. Patient side stepped to HOB 5ft with min assistance of 2. Noted L knee buckling with increased time required to advance LLE. Function limited by decreased strength, balance, and tolerance for functional mobility and activities. Patient will benefit from PT to improve strength, balance, and tolerance to improve transfer and gait abiity and to decrease fall risk. Anticipate SNF at discharge with continued rehab.   Goal Outcome Evaluation:  Plan of Care Reviewed With: patient           Outcome Evaluation: PT evaluation completed as co-eval with OT. Pt finishing lunch upon arrival, but ageeable to therapy.  AROM WFL RLE and L ankle/foot, some hypertonicity proximally requiring active-assistive range L knee, hip; Strength 4/5 grossly RLE; 3+/5 LLE distally and 3/5 proximally. Patient transferred supine to sit with min assistance and sit to supine with SBA.Patient transferred sit to stand to sit without device with min assistance of 2. Patient side stepped to HOB 5ft with min assistance of 2. Noted L knee buckling with increased time required to advance LLE. Function limited by decreased strength, balance, and tolerance for functional mobility and activities. Patient will benefit from PT to improve strength, balance, and tolerance to improve transfer and gait abiity and to decrease fall risk.  Anticipate SNF at discharge with continued rehab.

## 2023-04-24 NOTE — THERAPY EVALUATION
Patient Name: Sandoval Navarro  : 1960    MRN: [id number]                              Today's Date: 2023       Admit Date: 2023    Visit Dx:     ICD-10-CM ICD-9-CM   1. Chest pain, unspecified type  R07.9 786.50   2. Impaired mobility and ADLs  Z74.09 V49.89    Z78.9      Patient Active Problem List   Diagnosis   • Leukocytosis   • Chest pain   • Dyspnea on exertion   • Chronic fatigue   • PVD (peripheral vascular disease) with claudication   • Cigarette nicotine dependence, uncomplicated   • Overweight (BMI 25.0-29.9)   • Chronic obstructive pulmonary disease   • Carotid stenosis, asymptomatic, right   • ALICE (obstructive sleep apnea)   • Hyperlipemia   • Multiple subsegmental pulmonary emboli without acute cor pulmonale   • Acute on chronic respiratory failure with hypoxia and hypercapnia   • Chest pain, unspecified type     Past Medical History:   Diagnosis Date   • COPD (chronic obstructive pulmonary disease)    • Diabetes mellitus    • GERD (gastroesophageal reflux disease)    • Gout    • History of transfusion    • Hyperlipemia    • Sleep apnea    • Stroke      Past Surgical History:   Procedure Laterality Date   • BACK SURGERY     • CHOLECYSTECTOMY     • COLONOSCOPY N/A 3/14/2017    Procedure: COLONOSCOPY;  Surgeon: Cachorro Perez DO;  Location: [facility] ENDOSCOPY;  Service:    • HAND SURGERY     • LEG SURGERY Right    • NOSE SURGERY     • PANCREAS SURGERY     • SPLENECTOMY        General Information     Row Name 23 1312          OT Time and Intention    Document Type evaluation  -SJ     Mode of Treatment occupational therapy;physical therapy  -     Row Name 23 1312          General Information    Patient Profile Reviewed yes  -SJ     Prior Level of Function mod assist:;gait;transfer;bed mobility;dressing;bathing;independent:;feeding;grooming  -SJ     Existing Precautions/Restrictions fall  -SJ     Barriers to Rehab medically complex;previous functional deficit;cognitive  status  -     Row Name 04/24/23 1312          Living Environment    People in Home facility resident  Angel  -     Row Name 04/24/23 1312          Stairs Within Home, Primary    Stairs, Within Home, Primary Reports hes has been mostly in bed post GI bleed. He went to [place] for GI services, and transferred to [facility] where he is a long term patient. Has a w/c and a bryanna walker. Patient reports he gets assist for dressing, bathing, toileting.  -     Row Name 04/24/23 1312          Cognition    Orientation Status (Cognition) oriented to;person;place;situation  -     Row Name 04/24/23 1312          Safety Issues, Functional Mobility    Safety Issues Affecting Function (Mobility) safety precaution awareness;safety precautions follow-through/compliance;friction/shear risk;impulsivity;judgment  -     Impairments Affecting Function (Mobility) balance;cognition;endurance/activity tolerance;grasp;range of motion (ROM);strength;pain  -     Cognitive Impairments, Mobility Safety/Performance sequencing abilities;insight into deficits/self-awareness;problem-solving/reasoning;judgment;safety precaution awareness  -           User Key  (r) = Recorded By, (t) = Taken By, (c) = Cosigned By    Initials Name Provider Type     Alan Holder, OT Occupational Therapist                 Mobility/ADL's     Row Name 04/24/23 1312          Bed Mobility    Bed Mobility supine-sit;sit-supine  -     Supine-Sit [place] (Bed Mobility) standby assist  -     Sit-Supine [place] (Bed Mobility) minimum assist (75% patient effort)  -     Bed Mobility, Safety Issues decreased use of arms for pushing/pulling;decreased use of legs for bridging/pushing  -     Assistive Device (Bed Mobility) bed rails;head of bed elevated  -     Row Name 04/24/23 1312          Transfers    Transfers sit-stand transfer  -     Row Name 04/24/23 1312          Sit-Stand Transfer    Sit-Stand [place] (Transfers) minimum assist  (75% patient effort);2 person assist  -     Row Name 04/24/23 1312          Activities of Daily Living    BADL Assessment/Intervention lower body dressing  -     Row Name 04/24/23 1312          Lower Body Dressing Assessment/Training    [place] Level (Lower Body Dressing) don;doff;socks;maximum assist (25% patient effort)  -           User Key  (r) = Recorded By, (t) = Taken By, (c) = Cosigned By    Initials Name Provider Type     Alan Holder OT Occupational Therapist               Obj/Interventions     Row Name 04/24/23 1312          Sensory Assessment (Somatosensory)    Sensory Assessment (Somatosensory) UE sensation intact;left UE  -     Left UE Sensory Assessment impaired  -     Sensory Assessment Decreased sensation to light touch to LUE  -     Row Name 04/24/23 1312          Range of Motion Comprehensive    General Range of Motion other (see comments)  -     Comment, General Range of Motion AROM RUE WFL; PROM  LUE-- L shoulder flexion 30º, L shoulder abduction 20º, L elbow flexion 90º-105º (with low load stretch available elbow ROM 5º-105º with flexor tone returning to 90º); L wrist 0-20º flexion (unable to perform extension due to spasticity), L hand in flexion (spastiscity) (PROM WFL)  -     Row Name 04/24/23 1312          Strength Comprehensive (MMT)    General Manual Muscle Testing (MMT) Assessment other (see comments)  -     Comment, General Manual Muscle Testing (MMT) Assessment RUE 4/5 grossly, LUE 1/5 grossly  -           User Key  (r) = Recorded By, (t) = Taken By, (c) = Cosigned By    Initials Name Provider Type     Alan Holder OT Occupational Therapist               Goals/Plan     Row Name 04/24/23 1312          Transfer Goal 1 (OT)    Activity/Assistive Device (Transfer Goal 1, OT) toilet  -     [place] Level/Cues Needed (Transfer Goal 1, OT) minimum assist (75% or more patient effort)  -     Time Frame (Transfer Goal 1, OT) long term goal (LTG);by  discharge  -SJ     Progress/Outcome (Transfer Goal 1, OT) goal not met  -     Row Name 04/24/23 1312          Bathing Goal 1 (OT)    Activity/Device (Bathing Goal 1, OT) lower body bathing  -SJ     [place] Level/Cues Needed (Bathing Goal 1, OT) standby assist  -SJ     Time Frame (Bathing Goal 1, OT) long term goal (LTG);by discharge  -     Progress/Outcomes (Bathing Goal 1, OT) goal not met  -     Row Name 04/24/23 1312          Dressing Goal 1 (OT)    Activity/Device (Dressing Goal 1, OT) lower body dressing  -SJ     [place]/Cues Needed (Dressing Goal 1, OT) minimum assist (75% or more patient effort)  -SJ     Time Frame (Dressing Goal 1, OT) long term goal (LTG);by discharge  -     Progress/Outcome (Dressing Goal 1, OT) goal not met  -[facility] Name 04/24/23 1312          Toileting Goal 1 (OT)    Activity/Device (Toileting Goal 1, OT) toileting skills, all  -SJ     [place] Level/Cues Needed (Toileting Goal 1, OT) minimum assist (75% or more patient effort)  -SJ     Time Frame (Toileting Goal 1, OT) long term goal (LTG);by discharge  -     Progress/Outcome (Toileting Goal 1, OT) goal not met  -[facility] Name 04/24/23 1312          Therapy Assessment/Plan (OT)    Planned Therapy Interventions (OT) activity tolerance training;adaptive equipment training;BADL retraining;cognitive/visual perception retraining;edema control/reduction;neuromuscular control/coordination retraining;manual therapy/joint mobilization;IADL retraining;functional balance retraining;occupation/activity based interventions;ROM/therapeutic exercise;strengthening exercise;transfer/mobility retraining;passive ROM/stretching;patient/caregiver education/training  -           User Key  (r) = Recorded By, (t) = Taken By, (c) = Cosigned By    Initials Name Provider Type    Alan Velazquez OT Occupational Therapist               Clinical Impression     Row Name 04/24/23 1312          Pain Assessment    Pretreatment Pain  Rating 6/10  -     Posttreatment Pain Rating 6/10  -     Pain Location - chest  -     Pre/Posttreatment Pain Comment Reports chest pressure, presure remain consistant throughout session  -     Pain Intervention(s) Repositioned;Ambulation/increased activity  -     Row Name 04/24/23 1312          Plan of Care Review    Plan of Care Reviewed With patient;spouse  -     Outcome Evaluation OT eval complete, co-eval with PT, patient agreeable for evaluation. Supine to sit with min A. LE dressing with max A, donning/doffing socks. LUE with spasticity, and deminished sensation compared to RUE. Patient with no AROM to LUE, but has good ROM and strength with RUE. Sit to stand with min A x 2 person; patient does take side steps towards HOB with min A x 2 and HHA. Sit to supine with SBA. BP 90/53 supine, 102/55 EOB, 103/60 supine at end of session. Up in bed, all needs in reach, call light provided to RUE. Patient with decreased balance, increased RUE tone/spasticity, decreased safety in transfers, and decreased independence in ADLs. Cont inpatient OT. Recommend SNF at d/c.  -     Row Name 04/24/23 1312          Therapy Assessment/Plan (OT)    Patient/Family Therapy Goal Statement (OT) return to SNF  -     Rehab Potential (OT) good, to achieve stated therapy goals  -     Criteria for Skilled Therapeutic Interventions Met (OT) yes;skilled treatment is necessary  -     Therapy Frequency (OT) other (see comments)  5-7 d/wk  -     Predicted Duration of Therapy Intervention (OT) until d/c or all goals met  -     Row Name 04/24/23 1312          Therapy Plan Review/Discharge Plan (OT)    Anticipated Discharge Disposition (OT) skilled nursing facility  -     Row Name 04/24/23 1312          Vital Signs    Pre Systolic BP Rehab 90  -SJ     Pre Treatment Diastolic BP 53  -SJ     Intra Systolic BP Rehab 102  -SJ     Intra Treatment Diastolic BP 55  -SJ     Post Systolic BP Rehab 103  -SJ     Post Treatment  Diastolic BP 60  -SJ     Pretreatment Heart Rate (beats/min) 80  -SJ     Intratreatment Heart Rate (beats/min) 88  -SJ     Posttreatment Heart Rate (beats/min) 78  -SJ     Pre SpO2 (%) 95  -SJ     O2 Delivery Pre Treatment room air  -SJ     Post SpO2 (%) 97  -SJ     Pre Patient Position Supine  -SJ     Intra Patient Position Sitting  EOB  -SJ     Post Patient Position Supine  -SJ     Row Name 04/24/23 1312          Positioning and Restraints    Pre-Treatment Position in bed  -SJ     Post Treatment Position bed  -SJ           User Key  (r) = Recorded By, (t) = Taken By, (c) = Cosigned By    Initials Name Provider Type    SJ Alan Holder, OT Occupational Therapist               Outcome Measures     Row Name 04/24/23 1312          How much help from another is currently needed...    Putting on and taking off regular lower body clothing? 2  -SJ     Bathing (including washing, rinsing, and drying) 2  -SJ     Toileting (which includes using toilet bed pan or urinal) 2  -SJ     Putting on and taking off regular upper body clothing 3  -SJ     Taking care of personal grooming (such as brushing teeth) 3  -SJ     Eating meals 3  -SJ     AM-PAC 6 Clicks Score (OT) 15  -SJ     Row Name 04/24/23 0839 04/24/23 0237       How much help from another person do you currently need...    Turning from your back to your side while in flat bed without using bedrails? 3  - 3  -CN    Moving from lying on back to sitting on the side of a flat bed without bedrails? 2  - 2  -CN    Moving to and from a bed to a chair (including a wheelchair)? 2  - 2  -CN    Standing up from a chair using your arms (e.g., wheelchair, bedside chair)? 2  - 2  -CN    Climbing 3-5 steps with a railing? 2  - 2  -CN    To walk in hospital room? 2  -MH 2  -CN    AM-PAC 6 Clicks Score (PT) 13  -MH 13  -CN    Row Name 04/24/23 1312          Functional Assessment    Outcome Measure Options AM-PAC 6 Clicks Daily Activity (OT)  -           User Key  (r) =  Recorded By, (t) = Taken By, (c) = Cosigned By    Initials Name Provider Type    CN Kiersten Alaniz, RN Registered Nurse    Eric Ribeiro, RN Registered Nurse    Alan Velazquez OT Occupational Therapist                Occupational Therapy Education     Title: PT OT SLP Therapies (In Progress)     Topic: Occupational Therapy (In Progress)     Point: ADL training (Done)     Description:   Instruct learner(s) on proper safety adaptation and remediation techniques during self care or transfers.   Instruct in proper use of assistive devices.              Learning Progress Summary           Patient Acceptance, E,TB, VU by  at 4/24/2023 2953    Comment: POC, role of OT, transfer training                   Point: Home exercise program (Not Started)     Description:   Instruct learner(s) on appropriate technique for monitoring, assisting and/or progressing therapeutic exercises/activities.              Learner Progress:  Not documented in this visit.          Point: Precautions (Not Started)     Description:   Instruct learner(s) on prescribed precautions during self-care and functional transfers.              Learner Progress:  Not documented in this visit.          Point: Body mechanics (Not Started)     Description:   Instruct learner(s) on proper positioning and spine alignment during self-care, functional mobility activities and/or exercises.              Learner Progress:  Not documented in this visit.                      User Key     Initials Effective Dates Name Provider Type Discipline     06/14/21 -  Alan Holder OT Occupational Therapist OT              OT Recommendation and Plan  Planned Therapy Interventions (OT): activity tolerance training, adaptive equipment training, BADL retraining, cognitive/visual perception retraining, edema control/reduction, neuromuscular control/coordination retraining, manual therapy/joint mobilization, IADL retraining, functional balance retraining,  occupation/activity based interventions, ROM/therapeutic exercise, strengthening exercise, transfer/mobility retraining, passive ROM/stretching, patient/caregiver education/training  Therapy Frequency (OT): other (see comments) (5-7 d/wk)  Plan of Care Review  Plan of Care Reviewed With: patient, spouse  Outcome Evaluation: OT tawandaal complete, co-eval with PT, patient agreeable for evaluation. Supine to sit with min A. LE dressing with max A, donning/doffing socks. LUE with spasticity, and deminished sensation compared to RUE. Patient with no AROM to LUE, but has good ROM and strength with RUE. Sit to stand with min A x 2 person; patient does take side steps towards HOB with min A x 2 and HHA. Sit to supine with SBA. BP 90/53 supine, 102/55 EOB, 103/60 supine at end of session. Up in bed, all needs in reach, call light provided to RUE. Patient with decreased balance, increased RUE tone/spasticity, decreased safety in transfers, and decreased independence in ADLs. Cont inpatient OT. Recommend SNF at d/c.     Time Calculation:    Time Calculation- OT     Row Name 04/24/23 1404             Time Calculation- OT    OT Start Time 1312  -      OT Stop Time 1351  -      OT Time Calculation (min) 39 min  -SJ      OT Received On 04/24/23  -      OT Goal Re-Cert Due Date 05/07/23  -         Untimed Charges    OT Eval/Re-eval Minutes 39  -SJ         Total Minutes    Untimed Charges Total Minutes 39  -SJ       Total Minutes 39  -SJ            User Key  (r) = Recorded By, (t) = Taken By, (c) = Cosigned By    Initials Name Provider Type     Alan Holder OT Occupational Therapist              Therapy Charges for Today     Code Description Service Date Service Provider Modifiers Qty    [id number] HC OT EVAL MOD COMPLEXITY 3 4/24/2023 Alan Holder OT GO 1               Alan Holder OT  4/24/2023

## 2023-04-24 NOTE — H&P
[facility]  HISTORY AND PHYSICAL      Date of Admission: 4/23/2023  Primary Care Physician: Guanako Atkins MD    Subjective     Chief Complaint: Chest pain, shortness of breath    History of Present Illness  Patient with past medical history of diabetes, gout, hyperlipidemia presents complaining of chest pain/shortness of breath.  Patient states that symptoms started today.  Patient is extremely poor historian.  States chest pain is 4/10, tightness, lasting hours, intermittent, nonradiating.  States that last cardiac catheterization occurred 10 years ago.  Patient visits emergency room from [facility] after being treated at [facility] for pulmonary embolus/GI bleeding issues.  Patient discharged from hospital 4/18/2023.  CTA chest done today shows no pulmonary embolus.  Denies melena, hematochezia, hematemesis, or coffee-ground emesis        Review of Systems   Otherwise complete ROS reviewed and negative except as mentioned in the HPI.    Past Medical History:   Past Medical History:   Diagnosis Date   • Diabetes mellitus    • Gout    • Hyperlipemia      Past Surgical History:  Past Surgical History:   Procedure Laterality Date   • BACK SURGERY     • CHOLECYSTECTOMY     • COLONOSCOPY N/A 3/14/2017    Procedure: COLONOSCOPY;  Surgeon: Cachorro Perez DO;  Location: [facility] ENDOSCOPY;  Service:    • HAND SURGERY     • LEG SURGERY Right    • NOSE SURGERY     • PANCREAS SURGERY     • SPLENECTOMY       Social History:  reports that he has been smoking cigarettes. He has been smoking an average of 1 pack per day. He does not have any smokeless tobacco history on file. He reports that he does not currently use alcohol. He reports that he does not use drugs.    Family History: family history is not on file.       Medical History Relation Comments   Heart disease Brother  heart valve replacement   Heart disease Father      Stroke Father      Heart disease  Paternal Uncle            Allergies:  No Known Allergies    Medications:  Prior to Admission medications    Medication Sig Start Date End Date Taking? Authorizing Provider   acetaminophen (TYLENOL) 325 MG tablet Take 2 tablets by mouth Every 6 (Six) Hours As Needed for Mild Pain.    Nicola Vega MD   albuterol sulfate  (90 Base) MCG/ACT inhaler Inhale 2 puffs Every 4 (Four) Hours As Needed for Wheezing. 4/5/23   Rosalina Bhatt MD   atorvastatin (LIPITOR) 20 MG tablet Take 1 tablet by mouth Every Night.    Nicola Vega MD   calcium polycarbophil (FiberCon) 625 MG tablet Take 2 tablets by mouth Daily. 11/11/21   Nicola Vega MD   Continuous Blood Gluc Sensor (FreeStyle Fina 2 Sensor) misc REPLACE SENSOR E 14 DAYS 2/26/22   Nicola Vega MD   cyanocobalamin 1000 MCG/ML injection Inject 1 mL into the appropriate muscle as directed by prescriber Every 14 (Fourteen) Days.    Nicola Vega MD   Farxiga 5 MG tablet tablet Take 2 tablets by mouth Daily. 2/7/22   Nicola Vega MD   fluticasone (FLONASE) 50 MCG/ACT nasal spray 1 spray into the nostril(s) as directed by provider Daily.    Nicola Vega MD   glucose blood test strip USE TO TEST BLOOD SUGAR 3 TIMES A DAY 6/14/17   Nicola Vega MD   Insulin Glargine (BASAGLAR KWIKPEN) 100 UNIT/ML injection pen Inject 41 Units under the skin into the appropriate area as directed Daily. 4/5/23   Rosalina Bhatt MD   Insulin Lispro (humaLOG) 100 UNIT/ML injection Inject 8 Units under the skin into the appropriate area as directed 3 (Three) Times a Day Before Meals.    Nicola Vega MD   lubiprostone (AMITIZA) 8 MCG capsule Take 1 capsule by mouth 2 (Two) Times a Day.    Nicola Vega MD   magnesium oxide (MAG-OX) 400 MG tablet Take 1 tablet by mouth Daily.    Nicola Vega MD   metoprolol succinate XL (TOPROL-XL) 25 MG 24 hr tablet Take 0.5 tablets by mouth Daily. 4/7/23   Behroozi,  MD Cabrera   nicotine (NICODERM CQ) 14 MG/24HR patch Place 1 patch on the skin as directed by provider Daily. 5/9/23 5/23/23  Nicola Vega MD   nicotine (NICODERM CQ) 21 MG/24HR patch Place 1 patch on the skin as directed by provider Daily. 3/28/23 5/9/23  Nicola Vega MD   nicotine (NICODERM CQ) 7 MG/24HR patch Place 1 patch on the skin as directed by provider Daily. 5/23/23 6/6/23  Nicola Vega MD   nicotine polacrilex (Nicotine Mini) 4 MG lozenge Dissolve 1 lozenge in the mouth Every 6 (Six) Hours As Needed for Smoking Cessation.    Nicola Vega MD   nystatin (MYCOSTATIN) 339886 UNIT/GM powder Apply 1 application topically to the appropriate area as directed As Needed. Apply to groin    Nicola Vega MD   omeprazole (priLOSEC) 40 MG capsule Take 40 mg by mouth Daily. 2/5/22   Nicola Vega MD   Pancrelipase, Lip-Prot-Amyl, (CREON) 47287-643477 units capsule delayed-release particles capsule Take 3 capsules by mouth 3 (Three) Times a Day.    Nicola Vega MD   pantoprazole (PROTONIX) 40 MG injection Infuse 10 mL into a venous catheter Every 12 (Twelve) Hours. Indications: Gastrointestinal (GI) Bleed 4/7/23   Behroozi, Saeid, MD   simvastatin (ZOCOR) 10 MG tablet Take 1 tablet by mouth Every Night. 4/5/23   Rosalina Bhatt MD   sodium chloride 0.9 % solution Infuse 200 mL/hr into a venous catheter Continuous. 4/7/23   Behroozi, Saeid, MD   tamsulosin (FLOMAX) 0.4 MG capsule 24 hr capsule Take 1 capsule by mouth Daily.    Nicola Vega MD   vitamin D (ERGOCALCIFEROL) 1.25 MG (10596 UT) capsule capsule Take 50,000 Units by mouth 1 (One) Time Per Week. 1/31/22   Nicola Vega MD     I have utilized all available immediate resources to obtain, update, and review the patient's current medications.    Objective     Vital Signs: /68 (BP Location: Left arm, Patient Position: Lying)   Pulse 68   Temp 97.4 °F (36.3 °C)   Resp 18   Ht 188 cm  "(74\")   Wt 80.3 kg (177 lb)   SpO2 96%   BMI 22.73 kg/m²   Physical Exam  Constitutional:       Appearance: He is obese. He is ill-appearing.   HENT:      Head: Normocephalic and atraumatic.      Right Ear: Ear canal normal.      Left Ear: Ear canal normal.      Nose: Nose normal.      Mouth/Throat:      Mouth: Mucous membranes are moist.      Pharynx: Oropharynx is clear.   Eyes:      Extraocular Movements: Extraocular movements intact.      Conjunctiva/sclera: Conjunctivae normal.      Pupils: Pupils are equal, round, and reactive to light.   Cardiovascular:      Rate and Rhythm: Normal rate and regular rhythm.      Pulses: Normal pulses.      Heart sounds: Normal heart sounds.   Pulmonary:      Effort: Pulmonary effort is normal.      Breath sounds: Normal breath sounds.   Abdominal:      General: Abdomen is flat. Bowel sounds are normal.      Palpations: Abdomen is soft.   Musculoskeletal:         General: Normal range of motion.      Cervical back: Normal range of motion and neck supple.   Skin:     General: Skin is warm.      Capillary Refill: Capillary refill takes less than 2 seconds.   Neurological:      General: No focal deficit present.      Mental Status: He is oriented to person, place, and time. Mental status is at baseline.   Psychiatric:         Mood and Affect: Mood normal.         Behavior: Behavior normal.         Thought Content: Thought content normal.         Judgment: Judgment normal.            Results Reviewed:  Lab Results (last 24 hours)     Procedure Component Value Units Date/Time    High Sensitivity Troponin T 2Hr [[id number]] Collected: 04/23/23 2325    Specimen: Blood Updated: 04/23/23 2328    Extra Tubes [[id number]] Collected: 04/23/23 2126    Specimen: Blood, Venous Line Updated: 04/23/23 2231    Narrative:      The following orders were created for panel order Extra Tubes.  Procedure                               Abnormality         Status                     ---------            "                    -----------         ------                     Gold Top - SST[[id number]]                                   Final result               Birch Top[[id number]]                                         In process                   Please view results for these tests on the individual orders.    Gold Top - SST [[id number]] Collected: 04/23/23 2126    Specimen: Blood Updated: 04/23/23 2231     Extra Tube Hold for add-ons.     Comment: Auto resulted.       Protime-INR [[id number]]  (Normal) Collected: 04/23/23 2121    Specimen: Blood Updated: 04/23/23 2151     Protime 12.5 Seconds      INR 0.95    Narrative:      Therapeutic range for most indications is 2.0-3.0 INR,  or 2.5-3.5 for mechanical heart valves.    aPTT [[id number]]  (Normal) Collected: 04/23/23 2121    Specimen: Blood Updated: 04/23/23 2151     PTT 26.5 seconds     Narrative:      The recommended Heparin therapeutic range is 68-97 seconds.    Magnesium [[id number]]  (Normal) Collected: 04/23/23 2121    Specimen: Blood Updated: 04/23/23 2148     Magnesium 2.0 mg/dL     Comprehensive Metabolic Panel [[id number]]  (Abnormal) Collected: 04/23/23 2121    Specimen: Blood Updated: 04/23/23 2148     Glucose 281 mg/dL      BUN 13 mg/dL      Creatinine 0.73 mg/dL      Sodium 140 mmol/L      Potassium 4.1 mmol/L      Chloride 103 mmol/L      CO2 27.0 mmol/L      Calcium 8.9 mg/dL      Total Protein 6.2 g/dL      Albumin 3.8 g/dL      ALT (SGPT) 34 U/L      AST (SGOT) 42 U/L      Alkaline Phosphatase 79 U/L      Total Bilirubin <0.2 mg/dL      Globulin 2.4 gm/dL      A/G Ratio 1.6 g/dL      BUN/Creatinine Ratio 17.8     Anion Gap 10.0 mmol/L      eGFR 101.6 mL/min/1.73     Narrative:      GFR Normal >60  Chronic Kidney Disease <60  Kidney Failure <15      High Sensitivity Troponin T [[id number]]  (Abnormal) Collected: 04/23/23 2121    Specimen: Blood Updated: 04/23/23 2146     HS Troponin T 17 ng/L     Narrative:      High Sensitive Troponin T Reference  Range:  <10.0 ng/L- Negative Female for AMI  <15.0 ng/L- Negative Male for AMI  >=10 - Abnormal Female indicating possible myocardial injury.  >=15 - Abnormal Male indicating possible myocardial injury.   Clinicians would have to utilize clinical acumen, EKG, Troponin, and serial changes to determine if it is an Acute Myocardial Infarction or myocardial injury due to an underlying chronic condition.         BNP [[id number]]  (Normal) Collected: 04/23/23 2121    Specimen: Blood Updated: 04/23/23 2146     proBNP 52.5 pg/mL     Narrative:      Among patients with dyspnea, NT-proBNP is highly sensitive for the detection of acute congestive heart failure. In addition NT-proBNP of <300 pg/ml effectively rules out acute congestive heart failure with 99% negative predictive value.      CBC & Differential [[id number]]  (Abnormal) Collected: 04/23/23 2121    Specimen: Blood Updated: 04/23/23 2128    Narrative:      The following orders were created for panel order CBC & Differential.  Procedure                               Abnormality         Status                     ---------                               -----------         ------                     CBC Auto Differential[[id number]]        Abnormal            Final result                 Please view results for these tests on the individual orders.    CBC Auto Differential [[id number]]  (Abnormal) Collected: 04/23/23 2121    Specimen: Blood Updated: 04/23/23 2128     WBC 9.32 10*3/mm3      RBC 3.49 10*6/mm3      Hemoglobin 10.2 g/dL      Hematocrit 32.2 %      MCV 92.3 fL      MCH 29.2 pg      MCHC 31.7 g/dL      RDW 15.9 %      RDW-SD 53.2 fl      MPV 9.9 fL      Platelets 403 10*3/mm3      Neutrophil % 53.4 %      Lymphocyte % 34.3 %      Monocyte % 10.1 %      Eosinophil % 0.9 %      Basophil % 0.8 %      Immature Grans % 0.5 %      Neutrophils, Absolute 4.98 10*3/mm3      Lymphocytes, Absolute 3.20 10*3/mm3      Monocytes, Absolute 0.94 10*3/mm3      Eosinophils,  Absolute 0.08 10*3/mm3      Basophils, Absolute 0.07 10*3/mm3      Immature Grans, Absolute 0.05 10*3/mm3      nRBC 0.0 /100 WBC     Birch Top [[id number]] Collected: 04/23/23 2126    Specimen: Blood Updated: 04/23/23 2126        Imaging Results (Last 24 Hours)     Procedure Component Value Units Date/Time    CT Angiogram Chest [[id number]] Collected: 04/23/23 2209     Updated: 04/23/23 2214    Narrative:        INDICATION:  Chest pain/PE    COMPARISON:  4/3/2023    TECHNIQUE:  Volumetric CT scan of the chest, from the thoracic inlet to the level of the  diaphragms, with intravenous contrast. 2D axial, sagittal, and coronal reformats  were performed. MIPS were performed on a separate workstation and reviewed.    FINDINGS:  Lungs: Linear opacities in the bilateral lower lobes are unchanged and likely  represent scarring.  Pleura: No effusion or pneumothorax.  Heart: Normal in size.  Vessels: Thoracic aorta is normal in caliber.  No pulmonary embolism is  identified.  Lymph nodes: None enlarged by CT size criteria.  Bones: No suspicious lesions.  Visualized upper abdomen: Unremarkable.    IMPRESSIONS:  1.  No CT evidence of acute thoracic pathology.    2.  Previous pulmonary emboli have resolved.          I have personally reviewed and interpreted the radiology studies and ECG obtained at time of admission.     Assessment / Plan     Assessment:   Active Hospital Problems    Diagnosis    • **Chest pain, unspecified type      Plan:   RESOLVED PE:  - Previously diagnosed, but patient discontinued from anticoagulant therapy after GI bleeding episode 4/18/2023.  CTA chest done today shows no pulmonary embolus.  We will therefore only place patient on DVT dose of Lovenox not thromboembolic treatment dose.  Continue to watch patient closely during hospitalization for signs of recurrent PE related hypoxia.  Check lower extremity venous Dopplers looking for DVTs.    Chest pain rule out ACS:  - Echocardiogram, serial troponins,  telemetry.  Could be secondary to pulmonary embolus but ACS needs to be ruled out during hospitalization given patient's risk factors and presenting heart score of 5.  Consult cardiology.    Diabetes: Sign scale insulin, ACHS Accu-Cheks    PPX heparin gtt  FEN diabetic      Medical Decision Making  Number and Complexity of problems: 2 major  Differential Diagnosis: Subacute pulmonary embolus, chest pain, diabetes    Conditions and Status:        Condition is improving.    I have utilized all available immediate resources to obtain, update, or review the patient's current medications (including all prescriptions, over-the-counter products, herbals, cannabis/cannabidiol products, and vitamin/mineral/dietary (nutritional) supplements).      [facility] Data  External documents reviewed: Up-to-date, care everywhere  My EKG interpretation: Poor R wave progression  My CT interpretation: No pulmonary embolus noted on CTA chest  Tests considered but not ordered: None     Decision rules/scores evaluated (example [id number], Wells, etc): None     Discussed with: Patient, staff     Treatment Plan  See assessment and plan    Care Planning  Shared decision making: Patient, staff  Code status and discussions: Full code    Disposition  Social Determinants of Health that impact treatment or disposition: Currently lives in SNF  I expect the patient to be discharged to SNF in 1-3 days.     The patient was seen and examined by me on 4/23/2023 at 11:50 PM.    Electronically signed by Hardy Birch MD, 04/24/23, 00:08 CDT.      Addendum 4/24/2023 12:57 AM further reviewed records and noted echocardiogram results following past 60 days:     2/24/23 2D echo at [facility]  Summary     1. Left ventricular ejection fraction is normal, with an ejection fraction   of 65-70%.     2. Right ventricular systolic function is normal.     3. There is mild tricuspid valve regurgitation.     4. No pulmonary hypertension, estimated pulmonary arterial  systolic pressure   is 27 mmHg.     5. Normal inferior vena cava with >50% collapse upon inspiration consistent   with normal right atrial pressure, 3 mmHg.     6. There is no pericardial effusion.

## 2023-04-24 NOTE — ED PROVIDER NOTES
Subjective   History of Present Illness  61yo male nursing home resident, [facility] significant htn/hyperlipidemia/dm/copd/PE/ICH/ALICE, recently discharged [facility] 04.18.2023, secondary to PE/LGIB, presents ED c/o 1d hx soa/anterior precordial chest pain.  ROS neg fever/chills/cough/sore throat/hemoptysis/abd pain/melena/hematochoezia/hematemesis.    History provided by:  Patient  Shortness of Breath  Associated symptoms: no cough and no wheezing        Review of Systems   Constitutional: Negative.    HENT: Negative.    Eyes: Negative.    Respiratory: Positive for shortness of breath. Negative for cough and wheezing.    Cardiovascular: Negative.    Gastrointestinal: Negative.    Genitourinary: Negative.    Musculoskeletal: Negative.    Skin: Negative.    Neurological: Negative.    All other systems reviewed and are negative.      Past Medical History:   Diagnosis Date   • Diabetes mellitus    • Gout    • Hyperlipemia        No Known Allergies    Past Surgical History:   Procedure Laterality Date   • BACK SURGERY     • CHOLECYSTECTOMY     • COLONOSCOPY N/A 3/14/2017    Procedure: COLONOSCOPY;  Surgeon: Cachorro Perez DO;  Location: [facility] ENDOSCOPY;  Service:    • HAND SURGERY     • LEG SURGERY Right    • NOSE SURGERY     • PANCREAS SURGERY     • SPLENECTOMY         No family history on file.    Social History     Socioeconomic History   • Marital status: Single   Tobacco Use   • Smoking status: Every Day     Packs/day: 1.00     Types: Cigarettes   Vaping Use   • Vaping Use: Never used   Substance and Sexual Activity   • Alcohol use: Not Currently   • Drug use: No   • Sexual activity: Defer           Objective   Physical Exam  Vitals and nursing note reviewed.   Constitutional:       Appearance: Normal appearance.   HENT:      Head: Normocephalic and atraumatic.      Right Ear: External ear normal.      Left Ear: External ear normal.      Nose: Nose normal.      Mouth/Throat:      Mouth: Mucous membranes are  dry.      Pharynx: No oropharyngeal exudate or posterior oropharyngeal erythema.   Eyes:      Conjunctiva/sclera: Conjunctivae normal.      Pupils: Pupils are equal, round, and reactive to light.   Cardiovascular:      Rate and Rhythm: Normal rate. Occasional extrasystoles are present.     Pulses: Normal pulses.      Heart sounds: Normal heart sounds, S1 normal and S2 normal. No murmur heard.    No friction rub. No gallop.   Pulmonary:      Effort: Pulmonary effort is normal. No respiratory distress.      Breath sounds: Normal breath sounds. No wheezing, rhonchi or rales.   Abdominal:      General: Abdomen is flat. Bowel sounds are normal. There is no distension.      Palpations: Abdomen is soft.      Tenderness: There is no abdominal tenderness. There is no guarding or rebound.   Musculoskeletal:      Cervical back: Normal range of motion and neck supple. No rigidity.      Right lower leg: No edema.      Left lower leg: No edema.   Lymphadenopathy:      Cervical: No cervical adenopathy.   Skin:     General: Skin is warm and dry.      Coloration: Skin is pale.   Neurological:      General: No focal deficit present.      Mental Status: He is alert and oriented to person, place, and time.      GCS: GCS eye subscore is 4. GCS verbal subscore is 5. GCS motor subscore is 6.         ECG 12 Lead      Date/Time: 4/23/2023 8:51 PM  Performed by: Nelson Brand MD  Authorized by: Nelson Brand MD   Interpreted by physician  Rhythm: sinus rhythm  Ectopy: atrial premature contractions  Rate: normal  BPM: 77  QRS axis: normal  Conduction: conduction normal  ST Segments: ST segments normal  T Waves: T waves normal  T depression: aVL  Other: no other findings  Clinical impression: abnormal ECG                 ED Course      Labs Reviewed   COMPREHENSIVE METABOLIC PANEL - Abnormal; Notable for the following components:       Result Value    Glucose 281 (*)     Creatinine 0.73 (*)     AST (SGOT) 42 (*)     All other components  within normal limits    Narrative:     GFR Normal >60  Chronic Kidney Disease <60  Kidney Failure <15     TROPONIN - Abnormal; Notable for the following components:    HS Troponin T 17 (*)     All other components within normal limits    Narrative:     High Sensitive Troponin T Reference Range:  <10.0 ng/L- Negative Female for AMI  <15.0 ng/L- Negative Male for AMI  >=10 - Abnormal Female indicating possible myocardial injury.  >=15 - Abnormal Male indicating possible myocardial injury.   Clinicians would have to utilize clinical acumen, EKG, Troponin, and serial changes to determine if it is an Acute Myocardial Infarction or myocardial injury due to an underlying chronic condition.        CBC WITH AUTO DIFFERENTIAL - Abnormal; Notable for the following components:    RBC 3.49 (*)     Hemoglobin 10.2 (*)     Hematocrit 32.2 (*)     RDW 15.9 (*)     Lymphocytes, Absolute 3.20 (*)     Monocytes, Absolute 0.94 (*)     All other components within normal limits   PROTIME-INR - Normal    Narrative:     Therapeutic range for most indications is 2.0-3.0 INR,  or 2.5-3.5 for mechanical heart valves.   APTT - Normal    Narrative:     The recommended Heparin therapeutic range is 68-97 seconds.   BNP (IN-HOUSE) - Normal    Narrative:     Among patients with dyspnea, NT-proBNP is highly sensitive for the detection of acute congestive heart failure. In addition NT-proBNP of <300 pg/ml effectively rules out acute congestive heart failure with 99% negative predictive value.     MAGNESIUM - Normal   HIGH SENSITIVITIY TROPONIN T 2HR   CBC AND DIFFERENTIAL    Narrative:     The following orders were created for panel order CBC & Differential.  Procedure                               Abnormality         Status                     ---------                               -----------         ------                     CBC Auto Differential[[id number]]        Abnormal            Final result                 Please view results for these  tests on the individual orders.   GOLD TOP - SST   EXTRA TUBES    Narrative:     The following orders were created for panel order Extra Tubes.  Procedure                               Abnormality         Status                     ---------                               -----------         ------                     Gold Top - SST[[id number]]                                   Final result               Birch Top[[id number]]                                         In process                   Please view results for these tests on the individual orders.   GRAY TOP     CT Abdomen Pelvis Without Contrast    Result Date: 4/9/2023  Narrative: CT ABDOMEN PELVIS WO CONTRAST HISTORY: severe abdominal pain COMPARISON: 4/7/2023 Automated exposure control was also utilized to decrease patient radiation dose. TECHNIQUE: Noncontrast enhanced images of the abdomen and pelvis obtained without oral contrast. Multiplanar reformatted images were provided for review. Lack of IV contrast limits the evaluation of solid organ lesions. FINDINGS: LOWER CHEST: There is left basilar atelectasis.. LIVER: No focal liver lesion within the limitation of a noncontrast exam.. SPLEEN: Unremarkable. BILIARY SYSTEM: The gallbladder has been removed. There is no evidence of biliary ductal dilation. PANCREAS: There is distal pancreatectomy. ADRENALS: Unremarkable. KIDNEYS: Bilateral kidneys are unremarkable. The ureters are decompressed and normal in appearance. RETROPERITONEUM: No pathologic adenopathy is seen.. VASCULATURE: There is moderate atherosclerotic disease of the aorta and branch vessels SMALL BOWEL/STOMACH: No evidence of obstruction or bowel wall thickening. COLON: The colon is diffusely distended and fluid-filled. The rectum is more distended on today's exam than prior. There is some high attenuation material layering dependently within the cecum. No contrast was administered for this exam.. The appendix is not definitively visualized, but  there is no evidence of acute appendicitis PELVIS: No mass lesion, fluid collection or significant lymphadenopathy is seen in the pelvis. The urinary bladder is within normal limits . BONES:  There is no acute osseous abnormality..    Impression: IMPRESSION: 1. The colon has become more diffusely distended with fluid with particular distention of the rectum. 2. Otherwise unchanged exam..    CT Head Without Contrast    Result Date: 4/14/2023  Narrative: COMPARISON: MR brain 04/08/2023. TECHNIQUE: Axial images were performed from the calvarium through the base of the skull followed by 2D multiplanar reformats. FINDINGS: The paranasal sinuses and mastoid air cells are generally clear.  The intraorbital contents are unremarkable. The calvarium demonstrates gross abnormality. The ventricular and cisternal spaces are age-appropriate.  Remote infarcts in the right parietal and left frontal lobes.  Subacute hemorrhage in the right posterior frontal lobe similar to the previous MRI.  An underlying mass lesion is not entirely excluded in this area of subacute hemorrhage.    Impression: IMPRESSION: 1.  Subacute hemorrhage in the posterior aspect of the right frontal lobe.  An underlying mass lesion is not entirely excluded and a follow-up MRI in 1-2 weeks with contrast is recommended. 2.  Remote infarcts bilateral frontal lobes stable.    CT Head Without Contrast    Result Date: 4/6/2023  Narrative: HISTORY: Fall. COMPARISON: None. TECHNIQUE: Routine helical imaging through the brain with axial, coronal and sagittal two-dimensional reformatted images. FINDINGS: Vague area of decreased attenuation at the right frontoparietal convexity measuring up to about 4.6 cm as seen on image 39 of series 2.  There is also extensive chronic microvascular ischemic change with areas of old infarct involving the right parieto-occipital lobe and left frontal lobe.  No midline shift or mass effect.  No acute hemorrhage.  There is mild prominence  of the lateral ventricles, presumably on the basis of central atrophy. No calvarial fracture is seen.     Impression: 1. Vague, ovoid area of ill-defined low-attenuation near the right frontoparietal convexity.  Subacute infarct or underlying mass/edema could be considered.  Follow-up brain MRI without and with gadolinium would be most useful. 2.  Extensive chronic ischemic change with large areas of encephalomalacia in the left frontal and right parietooccipital lobes.    CT Abdomen Pelvis With Contrast    Result Date: 4/6/2023  Narrative: CT ABDOMEN PELVIS W CONTRAST HISTORY: lower GIB with abd pain COMPARISON: 8/27/2020. Automated exposure control was also utilized to decrease patient radiation dose. TECHNIQUE: Following the intravenous administration of contrast, helical CT tomographic images of the abdomen and pelvis were acquired. Multiplanar reformatted images were provided for review. FINDINGS: LOWER CHEST: There is left basilar atelectasis.. LIVER: No focal liver lesion. The hepatic vasculature is patent. There is moderate motion artifact. SPLEEN: The spleen appears absent. There are small splenules.. BILIARY SYSTEM: The gallbladder has been removed. There is no evidence of biliary ductal dilation. PANCREAS: No focal pancreatic lesion. ADRENALS: Unremarkable. KIDNEYS: Bilateral kidneys are unremarkable. The ureters are decompressed and normal in appearance. RETROPERITONEUM: No pathologic adenopathy is seen. VASCULATURE: There is moderate atherosclerotic disease of the aorta and branch vessels. SMALL BOWEL/STOMACH: No evidence of obstruction or bowel wall thickening. COLON: There is sigmoid diverticulosis without acute diverticulitis. The rectum is distended with fluid and stool. No surrounding inflammation is seen. There is some high attenuation in the descending colon. It is possible this represents an active bleed. This is seen on series 2 image 45.. The appendix is visualized and normal in appearance  PELVIS: No mass lesion, fluid collection or significant lymphadenopathy is seen in the pelvis. The urinary bladder is within normal limits . BONES:  There is no acute osseous abnormality.     Impression: 1. There is some high density material within the lumen of the mid descending colon. It is possible this represents an active bleeding site as no additional areas of high density stool are seen in the remainder of the colon. 2. The colon distal to this region is distended with fluid and stool, but not obstructed.     XR Chest 1 View    Result Date: 4/17/2023  Narrative: FINDINGS: The heart size and pulmonary vascularity are normal.  The lungs are clear.  The mediastinum, flako and visualized osseous structures are unremarkable.    Impression: IMPRESSION: No significant abnormality of the chest.    XR Chest 1 View    Result Date: 4/9/2023  Narrative: XR CHEST PORTABLE HISTORY: rib pain COMPARISON: None. FINDINGS: The lungs are clear. Cardiac silhouette is within normal limits. The pulmonary vasculature is within normal limits. The osseous structures and surrounding soft tissues demonstrate no acute abnormality.    Impression: IMPRESSION: 1. No radiographic evidence of acute cardiopulmonary process.    XR Chest 1 View    Result Date: 4/3/2023  Narrative: AP CHEST: HISTORY: Tachycardia. COMPARISON: 02/01/2019. FINDINGS: Slight prominence of the interstitial markings, but no focal airspace disease. Is there history of tobacco use or reactive airways disease?  Otherwise, mild viral pneumonitis could be considered. No pleural effusion or pneumothorax was seen. The heart is normal in size.  Mediastinal contour is within normal limits. The visualized osseous structures appear intact.     CT Angiogram Chest    Result Date: 4/23/2023  Narrative: INDICATION: Chest pain/PE COMPARISON: 4/3/2023 TECHNIQUE: Volumetric CT scan of the chest, from the thoracic inlet to the level of the diaphragms, with intravenous contrast. 2D axial,  sagittal, and coronal reformats were performed. MIPS were performed on a separate workstation and reviewed. FINDINGS: Lungs: Linear opacities in the bilateral lower lobes are unchanged and likely represent scarring. Pleura: No effusion or pneumothorax. Heart: Normal in size. Vessels: Thoracic aorta is normal in caliber.  No pulmonary embolism is identified. Lymph nodes: None enlarged by CT size criteria. Bones: No suspicious lesions. Visualized upper abdomen: Unremarkable. IMPRESSIONS: 1.  No CT evidence of acute thoracic pathology. 2.  Previous pulmonary emboli have resolved.     CT Angiogram Chest    Result Date: 4/3/2023  Narrative: INDICATION: Shortness of breath.  Elevated d-dimer. TECHNIQUE: IV contrast was administered and axial images from the thoracic inlet through the diaphragms were performed.  3D multiplanar reformats of the thoracic aorta were completed on a separate workstation under concurrent supervision. COMPARISON: 9/6/2018. FINDINGS: There is mild pulmonary embolism in the right middle and lower lobe pulmonary arteries extending to the segmental and subsegmental branches in the right lower lobe.  Peripheral nonocclusive thrombus seen, however, appears to be chronic rather than acute. No pleural or pericardial effusion or lymphadenopathy is seen.  Upper lobe dominant emphysema is seen in the lungs.  Mild bibasilar lung changes that are more marked on the left may be from pneumonia or atelectasis. Visualized upper abdomen shows surgical clips from a cholecystectomy with additional surgical clips likely from a splenectomy and a distal pancreatectomy. Splenules noted along the greater curvature of the stomach without significant change.  No significant bone abnormality is seen.     Impression: 1.  Exam positive for pulmonary embolism in right middle and lower lobe pulmonary artery branches extending to the segmental and subsegmental branches primarily of the right lower lobe.  Embolic burden is mild.   Given the peripheral location of the nonocclusive thrombus it is most likely chronic.  No definite acute pulmonary embolism is seen. 2.  Mild bibasilar lung changes may be secondary to atelectasis or pneumonia.    CTA CHEST FOR PULMONARY EMBOLISM W CONTRAST    Result Date: 4/17/2023  Narrative: CTA CHEST FOR PULMONARY EMBOLISM W CONTRAST HISTORY: Chest pain COMPARISON: 9/26/2018. Automated exposure control was also utilized to decrease patient radiation dose. TECHNIQUE: Helical tomographic images of the chest were obtained after the administration of intravenous contrast following angiogram protocol. Additionally, 3D and multiplanar reformatted images were provided. FINDINGS: Pulmonary arteries: There are no filling defects within the main or lobar pulmonary arteries. The pulmonary arteries are within normal limits for size. Aorta and great vessels: The aorta is well opacified and demonstrates no dissection or aneurysm. The brachiocephalic vessels are normal in appearance.. No coronary artery calcifications are visualized within limits of this study. Visualized neck base: The imaged portion of the base of the neck appears unremarkable. Lungs: Subsegmental atelectasis noted in bilateral lower lobes. There is no mass, worrisome nodule, or consolidation. No pleural effusion is seen. The trachea and bronchial tree are patent. Heart: The heart is normal in size. There is no pericardial effusion. Mediastinum and lymph nodes: No enlarged mediastinal, hilar, or axillary lymph nodes are present. Skeletal and soft tissues: The osseous structures of the thorax and surrounding soft tissues demonstrate no acute process. Upper abdomen: The imaged portion of the upper abdomen demonstrates no acute process.    Impression: IMPRESSION: 1.  No central pulmonary embolism. 2.  No thoracic aortic aneurysm/dissection. 3.  No acute abnormality identified in the thorax.    CT CERVICAL SPINE WO CONTRAST    Result Date: 4/14/2023  Narrative:  COMPARISON: None. TECHNIQUE: Axial images from the base of the skull through the thoracic inlet were performed followed by 2D multiplanar reformats. FINDINGS: Normal alignment of the cervical vertebra.  Postsurgical change from ACDF at the C5 and C6 levels without evidence of complication. Remote spinous process fractures of C6 and C7 which are displaced.  No acute fracture.  Mild multilevel degenerative changes of the cervical spine.    Impression: IMPRESSION: No acute fracture of the cervical spine.    XR ABDOMEN KUB GI PLACEMENT PORTABLE    Result Date: 4/11/2023  Narrative: COMPARISON: Exam from yesterday FINDINGS: Enteric tube terminates in the fundus of the stomach.    XR ABDOMEN KUB GI PLACEMENT PORTABLE    Result Date: 4/10/2023  Narrative: FINDINGS: Enteric tube terminates in the fundus of the stomach.    MRI BRAIN WO CONTRAST    Result Date: 4/8/2023  Narrative: This result has an attachment that is not available. COMPARISON: Outside exam from 4/6/2023. TECHNIQUE: Multi-planar, multi-sequence images were obtained through the brain.  No IV contrast was administered. FINDINGS: There is a large hemorrhage in the right posterior frontal lobe measuring 5.1 x 3 cm. There is severe motion artifact limiting the exam. It would be difficult to exclude underlying mass. This is high signal on T1 and T2-weighted images indicating its subacute nature. It was not high attenuation on recent CT scan. There is chronic change in the deep white matter.  No additional lesions are seen.    Impression: IMPRESSION: 1.   5.1 cm subacute hemorrhage in the right posterior frontal lobe. 2.  It would be difficult to exclude an underlying mass, but most of this is sub acute hemorrhage. 3. The study is nearly nondiagnostic due to severe motion artifact.    CTA ABDOMEN PELVIS W AND-OR WO    Result Date: 4/7/2023  Narrative: HISTORY: The patient has history of gastrointestinal hemorrhage. TECHNIQUE: Images were obtained before and after  administration of intravenous contrast. Additional sagittal and coronal reconstructions were performed.  3D multiplanar reformats of the abdominal aorta and its branch vessels were completed on a separate workstation under concurrent supervision. FINDINGS: Minimal atelectasis involving the bases of both lower lobes. There is a small cyst in the right lobe of the liver. The portal vein is patent. The spleen is absent. The bilateral kidneys demonstrate no evidence of hydronephrosis. The  bilateral adrenal glands are within normal limits. There has been partial resection of the pancreas. No pancreatic ductal dilatation is seen. The bilateral adrenal glands are within normal limits. No bowel obstruction is identified. No active gastrointestinal hemorrhage is identified. The SMA, celiac axis and inferior mesenteric artery are patent.    Impression: IMPRESSION: No active gastrointestinal hemorrhage is identified.    US VENOUS DOPPLER LOWER EXTREMITY BILATERAL    Result Date: 4/7/2023  Narrative: TECHNIQUE: High-resolution gray scale imaging, color flow Doppler, compression were performed from the groin to the calf of the bilateral lower extremities. Augmentation was performed of the bilateral common femoral vein and popliteal vein. FINDINGS: Normal color flow and compressibility, augmentation, phasicity and competency of the common femoral, femoral and popliteal veins.  There is normal compressibility of the calf trifurcation.  No evidence of deep vein thrombosis.    Impression: IMPRESSION: Normal bilateral lower extremity venous duplex ultrasound.  No evidence of DVT.         HEART Score for Major Cardiac Events - MDCalc  Calculated on Apr 24 2023 12:26 AM  5 points -> Moderate Score (4-6 points) Risk of MACE of 12-16.6%.  If troponin is positive, many experts recommend further workup and admission even with a low HEART Score.                                MDM    Final diagnoses:   Chest pain, unspecified type       ED  Disposition  ED Disposition     ED Disposition   Decision to Admit    Condition   --    Comment   Level of Care: Telemetry [5]   Admitting Physician: MANOHAR HESS [[id number]]   Attending Physician: MANOHAR HESS [[id number]]   Patient Class: Observation [104]               No follow-up provider specified.       Medication List      No changes were made to your prescriptions during this visit.          Nelson Brand MD  04/23/23 [id number]

## 2023-04-24 NOTE — PLAN OF CARE
Goal Outcome Evaluation:  Plan of Care Reviewed With: patient, spouse           Outcome Evaluation: OT clifford complete, co-eval with PT, patient agreeable for evaluation. Supine to sit with min A. LE dressing with max A, donning/doffing socks. LUE with spasticity, and deminished sensation compared to RUE. Patient with no AROM to LUE, but has good ROM and strength with RUE. Sit to stand with min A x 2 person; patient does take side steps towards HOB with min A x 2 and HHA. Sit to supine with SBA. BP 90/53 supine, 102/55 EOB, 103/60 supine at end of session. Up in bed, all needs in reach, call light provided to RUE. Patient with decreased balance, increased RUE tone/spasticity, decreased safety in transfers, and decreased independence in ADLs. Cont inpatient OT. Recommend SNF at d/c.

## 2023-04-24 NOTE — PLAN OF CARE
Goal Outcome Evaluation:         Pt VSS at this time.  CTA coronaries this shift.  Pt did have episode of chest pain this shift that resolved with one Nitro.  Hospitalist aware.  EKG obtained.  Will continue with care plan.  Progress: no change

## 2023-04-24 NOTE — PLAN OF CARE
Goal Outcome Evaluation:  Plan of Care Reviewed With: patient        Progress: no change  Outcome Evaluation: New patient; VSS, no complaints noted since arrival to unit.

## 2023-04-24 NOTE — PROGRESS NOTES
[facility] Medicine Services  INPATIENT PROGRESS NOTE    Length of Stay: 0  Date of Admission: 4/23/2023  Primary Care Physician: Guanako Atkins MD    Subjective   Chief Complaint: No complaints    HPI: This is a 62-year-old male with past medical history of COPD, DM 2, GERD, gout, HLD, sleep apnea and stroke that presented to [facility] on 4/23/2023 with complaints of chest pain and shortness of air.    Patient was recently discharged on 4/18/2023 for a pulmonary embolus and GI bleeding issues.  CTA of the chest on this admission shows resolution of pulmonary embolus.    Review of Systems   Constitutional: Negative for activity change and fatigue.   HENT: Negative for ear pain and sore throat.    Eyes: Negative for pain and discharge.   Respiratory: Positive for shortness of breath. Negative for cough.    Cardiovascular: Positive for chest pain. Negative for palpitations.   Gastrointestinal: Negative for abdominal pain and nausea.   Endocrine: Negative for cold intolerance and heat intolerance.   Genitourinary: Negative for difficulty urinating and dysuria.   Musculoskeletal: Negative for arthralgias and gait problem.   Skin: Negative for color change and rash.   Neurological: Negative for dizziness and weakness.   Psychiatric/Behavioral: Negative for agitation and confusion.        Objective    Temp:  [97.2 °F (36.2 °C)-97.5 °F (36.4 °C)] 97.5 °F (36.4 °C)  Heart Rate:  [60-82] 60  Resp:  [17-18] 18  BP: (111-131)/(58-75) 131/65    Physical Exam  Constitutional:       Appearance: He is well-developed.   HENT:      Head: Normocephalic and atraumatic.   Eyes:      Pupils: Pupils are equal, round, and reactive to light.   Cardiovascular:      Rate and Rhythm: Normal rate and regular rhythm.   Pulmonary:      Effort: Pulmonary effort is normal.      Breath sounds: Normal breath sounds.   Abdominal:      General: Bowel sounds are normal.      Palpations: Abdomen is soft.    Musculoskeletal:         General: Normal range of motion.      Cervical back: Normal range of motion and neck supple.   Skin:     General: Skin is warm and dry.   Neurological:      Mental Status: He is alert and oriented to person, place, and time.   Psychiatric:         Behavior: Behavior normal.       Results Review:  I have reviewed the labs, radiology results, and diagnostic studies.    Laboratory Data:   Results from last 7 days   Lab Units 04/24/23  0350 04/23/23 2121   SODIUM mmol/L 140 140   POTASSIUM mmol/L 4.0 4.1   CHLORIDE mmol/L 106 103   CO2 mmol/L 23.0 27.0   BUN mg/dL 12 13   CREATININE mg/dL 0.54* 0.73*   GLUCOSE mg/dL 215* 281*   CALCIUM mg/dL 9.0 8.9   BILIRUBIN mg/dL  --  <0.2   ALK PHOS U/L  --  79   ALT (SGPT) U/L  --  34   AST (SGOT) U/L  --  42*   ANION GAP mmol/L 11.0 10.0     Estimated Creatinine Clearance: 151.1 mL/min (A) (by C-G formula based on SCr of 0.54 mg/dL (L)).  Results from last 7 days   Lab Units 04/23/23 2121   MAGNESIUM mg/dL 2.0         Results from last 7 days   Lab Units 04/23/23  2121 04/22/23  0944   WBC 10*3/mm3 9.32 8.35   HEMOGLOBIN g/dL 10.2* 9.2*   HEMATOCRIT % 32.2* 29.9*   PLATELETS 10*3/mm3 403 335     Results from last 7 days   Lab Units 04/23/23 2121   INR  0.95       Culture Data:   No results found for: BLOODCX  No results found for: URINECX  No results found for: RESPCX  No results found for: WOUNDCX  No results found for: STOOLCX  No components found for: BODYFLD    Radiology Data:   Imaging Results (Last 24 Hours)     Procedure Component Value Units Date/Time    US Venous Doppler Lower Extremity Bilateral (duplex) [[id number]] Collected: 04/24/23 0422     Updated: 04/24/23 0658    Narrative:      INDICATION:  Rule out DVT and patient previously diagnosed with PE    COMPARISON:  None    TECHNIQUE:  Ultrasound examination of the bilateral lower extremity veins using  high-resolution grayscale images with and without graded compression and color  and  spectral Doppler images.    FINDINGS:  Right: No evidence of thrombus.  Fluid collection in the popliteal fossa, 4.9 x  2.1 x 3.1 cm, probably represents a popliteal cyst.  Left: No evidence of thrombus.      Impression:      No evidence of deep vein thrombosis in the bilateral lower extremities.      CT Angiogram Chest [[id number]] Collected: 04/23/23 2209     Updated: 04/24/23 0140    Narrative:        INDICATION:  Chest pain/PE    COMPARISON:  4/3/2023    TECHNIQUE:  Volumetric CT scan of the chest, from the thoracic inlet to the level of the  diaphragms, with intravenous contrast. 2D axial, sagittal, and coronal reformats  were performed. MIPS were performed on a separate workstation and reviewed.    FINDINGS:  Lungs: Linear opacities in the bilateral lower lobes are unchanged and likely  represent scarring.  Pleura: No effusion or pneumothorax.  Heart: Normal in size.  Vessels: Thoracic aorta is normal in caliber.  No pulmonary embolism is  identified.  Lymph nodes: None enlarged by CT size criteria.  Bones: No suspicious lesions.  Visualized upper abdomen: Unremarkable.    IMPRESSIONS:  1.  No CT evidence of acute thoracic pathology.    2.  Previous pulmonary emboli have resolved.            I have reviewed the patient's current medications.     Assessment/Plan     Active Hospital Problems    Diagnosis    • **Chest pain, unspecified type        Plan:    1.  Pulmonary embolus, resolved: Previously diagnosed.  CTA of the chest done shows resolution.  Anticoagulation was discontinued secondary to GI bleeding episode on 4/18/2023.  No evidence of DVT in bilateral lower extremities.  2.  Chest pain, rule out ACS: Serial cardiac enzymes, EKG and continuous telemetry.  CTA of the coronary arteries pending.  Cardiology consult appreciated.  3.  Diabetes mellitus, type II: Continue SSI and long-acting.  4.  GERD: Continue PPI.    VTE prophylaxis: Lovenox.      Medical Decision Making  Number and Complexity of problems:  4/high  Differential Diagnosis: GERD    Conditions and Status:        Condition is improving.     [facility] Data  External documents reviewed: Yes  My EKG interpretation: Normal sinus rhythm  My CT interpretation: Resolution of previous noted pulmonary emboli.  Tests considered but not ordered: None     Decision rules/scores evaluated (example [id number], Wells, etc): Heart     Discussed with: Patient     Treatment Plan  As above    Care Planning  Shared decision making: Yes  Code status and discussions: Full    Disposition  Social Determinants of Health that impact treatment or disposition: None  I expect the patient to be discharged to home in 1-2 days.     I confirmed that the patient's Advance Care Plan is present, code status is documented, or surrogate decision maker is listed in the patient's medical record.      I have utilized all available immediate resources to obtain, update, or review the patient's current medications.         This document has been electronically signed by NIKIA Garza on April 24, 2023 11:42 CDT

## 2023-04-24 NOTE — CONSULTS
Cardiology at [facility]  Cardiovascular Consultation Note      Sandoval Navarro  [id number]  1960    DATE OF ADMISSION: 4/23/2023  DATE OF CONSULTATION:  4/24/2023    Guanako Atkins MD  Treatment Team:   Attending Provider: Hardy Birch MD  Consulting Physician: Viola Watkins MD  Consulting Physician: Hardy Birch MD  Nurse Practitioner: Merari Santos APRN  Admitting Provider: Hardy Birch MD    Chief Complaint   Patient presents with   • Chest Pain       Chief complaint/ Reason for Consultation precordial and epigastric chest discomfort      History of Present Illness  62 years old patient with history of hypertension, previous history of stroke in February 2023, longstanding history of type 1 diabetes, hypertension who brought to our facility for evaluation chest discomfort predominant over the precordium and epigastric area pain described as a nagging discomfort.  No associated vomiting.  The patient was risk stratified with CT pulm angiogram no evidence of PE KG no acute ST-T wave changes.  High sensitive troponin is nondiagnostic.  He is a pain-free at the time of evaluation.  His wife at the bedside who is the power of .  Monitor no significant bradyarrhythmia.  Previous echo preserved left ventricle systolic function patient also history of GI bleed and previously treated at [facility] for pulmonary embolism      Echo February 2023  Summary     1. Left ventricular ejection fraction is normal, with an ejection fraction   of 65-70%.     2. Right ventricular systolic function is normal.     3. There is mild tricuspid valve regurgitation.     4. No pulmonary hypertension, estimated pulmonary arterial systolic pressure   is 27 mmHg.     5. Normal inferior vena cava with >50% collapse upon inspiration consistent   with normal right atrial pressure, 3 mmHg.     6. There is no pericardial effusion.    Past Medical History:   Diagnosis Date   • COPD (chronic  obstructive pulmonary disease)    • Diabetes mellitus    • GERD (gastroesophageal reflux disease)    • Gout    • History of transfusion    • Hyperlipemia    • Sleep apnea    • Stroke        Past Surgical History:   Procedure Laterality Date   • BACK SURGERY     • CHOLECYSTECTOMY     • COLONOSCOPY N/A 3/14/2017    Procedure: COLONOSCOPY;  Surgeon: Cachorro Perez DO;  Location: [facility] ENDOSCOPY;  Service:    • HAND SURGERY     • LEG SURGERY Right    • NOSE SURGERY     • PANCREAS SURGERY     • SPLENECTOMY         No Known Allergies    No current facility-administered medications on file prior to encounter.     Current Outpatient Medications on File Prior to Encounter   Medication Sig Dispense Refill   • aspirin 81 MG chewable tablet Chew 1 tablet Daily.     • atorvastatin (LIPITOR) 20 MG tablet Take 2 tablets by mouth Every Night.     • calcium polycarbophil (FiberCon) 625 MG tablet Take 2 tablets by mouth Every Night.     • DULoxetine (CYMBALTA) 30 MG capsule Take 1 capsule by mouth Every Night.     • Farxiga 5 MG tablet tablet Take 2 tablets by mouth Daily.     • fluticasone (FLONASE) 50 MCG/ACT nasal spray 1 spray into the nostril(s) as directed by provider Daily.     • Insulin Glargine (BASAGLAR KWIKPEN) 100 UNIT/ML injection pen Inject 41 Units under the skin into the appropriate area as directed Daily. (Patient taking differently: Inject 10 Units under the skin into the appropriate area as directed Every Night.)  5   • linaclotide (LINZESS) 145 MCG capsule capsule Take 1 capsule by mouth Every Morning Before Breakfast.     • Pancrelipase, Lip-Prot-Amyl, (CREON) 27506-164808 units capsule delayed-release particles capsule Take 3 capsules by mouth 3 (Three) Times a Day.     • acetaminophen (TYLENOL) 325 MG tablet Take 2 tablets by mouth Every 6 (Six) Hours As Needed for Mild Pain.     • Continuous Blood Gluc Sensor (FreeStyle Fina 2 Sensor) misc REPLACE SENSOR E 14 DAYS     • glucose blood test strip USE TO  TEST BLOOD SUGAR 3 TIMES A DAY     • magnesium oxide (MAG-OX) 400 MG tablet Take 1 tablet by mouth Daily.     • tamsulosin (FLOMAX) 0.4 MG capsule 24 hr capsule Take 1 capsule by mouth Daily.     • vitamin D (ERGOCALCIFEROL) 1.25 MG (47839 UT) capsule capsule Take 1 capsule by mouth 1 (One) Time Per Week.     • [DISCONTINUED] albuterol sulfate  (90 Base) MCG/ACT inhaler Inhale 2 puffs Every 4 (Four) Hours As Needed for Wheezing.     • [DISCONTINUED] cyanocobalamin 1000 MCG/ML injection Inject 1 mL into the appropriate muscle as directed by prescriber Every 14 (Fourteen) Days.     • [DISCONTINUED] Insulin Lispro (humaLOG) 100 UNIT/ML injection Inject 8 Units under the skin into the appropriate area as directed 3 (Three) Times a Day Before Meals.     • [DISCONTINUED] lubiprostone (AMITIZA) 8 MCG capsule Take 1 capsule by mouth 2 (Two) Times a Day.     • [DISCONTINUED] metoprolol succinate XL (TOPROL-XL) 25 MG 24 hr tablet Take 0.5 tablets by mouth Daily.     • [DISCONTINUED] nicotine (NICODERM CQ) 14 MG/24HR patch Place 1 patch on the skin as directed by provider Daily.     • [DISCONTINUED] nicotine (NICODERM CQ) 21 MG/24HR patch Place 1 patch on the skin as directed by provider Daily.     • [DISCONTINUED] nicotine (NICODERM CQ) 7 MG/24HR patch Place 1 patch on the skin as directed by provider Daily.     • [DISCONTINUED] nicotine polacrilex (COMMIT) 4 MG lozenge Dissolve 1 lozenge in the mouth Every 6 (Six) Hours As Needed for Smoking Cessation.     • [DISCONTINUED] nystatin (MYCOSTATIN) 026325 UNIT/GM powder Apply 1 application topically to the appropriate area as directed As Needed. Apply to groin     • [DISCONTINUED] omeprazole (priLOSEC) 40 MG capsule Take 40 mg by mouth Daily.     • [DISCONTINUED] pantoprazole (PROTONIX) 40 MG injection Infuse 10 mL into a venous catheter Every 12 (Twelve) Hours. Indications: Gastrointestinal (GI) Bleed     • [DISCONTINUED] simvastatin (ZOCOR) 10 MG tablet Take 1 tablet by  "mouth Every Night. 90 tablet    • [DISCONTINUED] sodium chloride 0.9 % solution Infuse 200 mL/hr into a venous catheter Continuous.         Social History     Socioeconomic History   • Marital status: Single   Tobacco Use   • Smoking status: Former     Packs/day: 1.00     Types: Cigarettes     Quit date: 3/1/2023     Years since quittin.1   Vaping Use   • Vaping Use: Never used   Substance and Sexual Activity   • Alcohol use: Not Currently   • Drug use: No   • Sexual activity: Defer           Review of Systems:   Constitutional:  no change in exercise tolerance.  HENT: Denies any hearing loss, epistaxis  Eyes: No blurred  Respiratory:  Denies dyspnea with exertion, no cough or wheezing  Cardiovascular: See H&P  Gastrointestinal:   History of GI bleed  Endocrine: Negative for cold intolerance, heat intolerance, polydipsia, polyphagia  Genitourinary: Negative.  Musculoskeletal: Denies any history of arthritic symptoms or back problems.   Skin:  Denies rashes or skin lesions.   Allergic/Immunologic: Negative.  Negative for environmental allergies  Neurological:  Denies any history of recurrent headaches   Hematological: No history of easy bruisability  Psychiatric/Behavioral: Denies any history of depression         Objective:     Vitals:    23 0221 23 0224 23 0711 23 0717   BP: 121/75   131/65   BP Location: Right arm   Left arm   Patient Position: Lying   Lying   Pulse: 71 65 62 60   Resp: 18   18   Temp: 97.2 °F (36.2 °C)   97.5 °F (36.4 °C)   TempSrc: Temporal   Oral   SpO2: 94%   96%   Weight: 77.3 kg (170 lb 8 oz)      Height: 188 cm (74.02\")        Body mass index is 21.88 kg/m².  Flowsheet Rows    Flowsheet Row First Filed Value   Admission Height 188 cm (74\") Documented at 2023   Admission Weight 80.3 kg (177 lb) Documented at 2023          Intake/Output Summary (Last 24 hours) at 2023 1142  Last data filed at 2023 0956  Gross per 24 hour   Intake -- "   Output 1100 ml   Net -1100 ml         Physical Exam:   Constitutional: Cooperative, alert and oriented, well-developed, well-nourished, in no acute distress.   Head: Normocephalic, conjunctivae are pink, thyroid is nonpalpable, no jugular vein distention  Cardiovascular regular rate/rhythm, no S3, no pericardial rub  Pulmonary/Chest: Chest positive bilateral, good air entry, no rales, no wheezing  Abdominal: Abdomen soft, bowel sounds normoactive  Musculoskeletal: No deformities, positive peripheral pulses  Neurological: Cooperative  Skin: Warm and dry to the touch, no apparent skin lesions or masses noted.   Psychiatric: Normal mood and affect. Behavior is normal.    Radiology Review    US Venous Doppler Lower Extremity Bilateral (duplex)   Final Result   No evidence of deep vein thrombosis in the bilateral lower extremities.         CT Angiogram Chest   Final Result      CT Angiogram Coronary    (Results Pending)       Lab Results:  Lab Results (last 24 hours)     Procedure Component Value Units Date/Time    Extra Tubes [[id number]] Collected: 04/24/23 0350    Specimen: Blood, Venous Line Updated: 04/24/23 0502    Narrative:      The following orders were created for panel order Extra Tubes.  Procedure                               Abnormality         Status                     ---------                               -----------         ------                     Lavender Top[[id number]]                                     Final result                 Please view results for these tests on the individual orders.    Lavender Top [[id number]] Collected: 04/24/23 0350    Specimen: Blood Updated: 04/24/23 0502     Extra Tube hold for add-on     Comment: Auto resulted       CRE Screen by PCR - Swab, Large Intestine, Rectum [[id number]] Collected: 04/24/23 0248    Specimen: Swab from Large Intestine, Rectum Updated: 04/24/23 0420     CRE SCREEN Not Detected     Comment: Test performed by real-time polymerase chain  reaction (qPCR).        OXA 48 Strain Not Detected     IMP STRAIN Not Detected     VIM STRAIN Not Detected     NDM Strain Not Detected     KPC Strain Not Detected    Basic Metabolic Panel [[id number]]  (Abnormal) Collected: 04/24/23 0350    Specimen: Blood Updated: 04/24/23 0419     Glucose 215 mg/dL      BUN 12 mg/dL      Creatinine 0.54 mg/dL      Sodium 140 mmol/L      Potassium 4.0 mmol/L      Chloride 106 mmol/L      CO2 23.0 mmol/L      Calcium 9.0 mg/dL      BUN/Creatinine Ratio 22.2     Anion Gap 11.0 mmol/L      eGFR 111.3 mL/min/1.73     Narrative:      GFR Normal >60  Chronic Kidney Disease <60  Kidney Failure <15      High Sensitivity Troponin T [[id number]]  (Abnormal) Collected: 04/24/23 0350    Specimen: Blood Updated: 04/24/23 0419     HS Troponin T 18 ng/L     Narrative:      High Sensitive Troponin T Reference Range:  <10.0 ng/L- Negative Female for AMI  <15.0 ng/L- Negative Male for AMI  >=10 - Abnormal Female indicating possible myocardial injury.  >=15 - Abnormal Male indicating possible myocardial injury.   Clinicians would have to utilize clinical acumen, EKG, Troponin, and serial changes to determine if it is an Acute Myocardial Infarction or myocardial injury due to an underlying chronic condition.         Extra Tubes [[id number]] Collected: 04/23/23 2126    Specimen: Blood, Venous Line Updated: 04/24/23 0131    Narrative:      The following orders were created for panel order Extra Tubes.  Procedure                               Abnormality         Status                     ---------                               -----------         ------                     Gold Top - [facility] [place][[id number]]                                   Final result               Birch Top[[id number]]                                         Final result                 Please view results for these tests on the individual orders.    Gray Top [[id number]] Collected: 04/23/23 2126    Specimen: Blood Updated: 04/24/23 0131     Extra  Tube Hold for add-ons.     Comment: Auto resulted.       Lipid Panel [[id number]] Collected: 04/23/23 2325    Specimen: Blood Updated: 04/24/23 0100     Total Cholesterol 138 mg/dL      Triglycerides 101 mg/dL      HDL Cholesterol 52 mg/dL      LDL Cholesterol  67 mg/dL      VLDL Cholesterol 19 mg/dL      LDL/HDL Ratio 1.27    Narrative:      Cholesterol Reference Ranges  (U.S. Department of Health and Human Services ATP III Classifications)    Desirable          <200 mg/dL  Borderline High    200-239 mg/dL  High Risk          >240 mg/dL      Triglyceride Reference Ranges  (U.S. Department of Health and Human Services ATP III Classifications)    Normal           <150 mg/dL  Borderline High  150-199 mg/dL  High             200-499 mg/dL  Very High        >500 mg/dL    HDL Reference Ranges  (U.S. Department of Health and Human Services ATP III Classifications)    Low     <40 mg/dl (major risk factor for CHD)  High    >60 mg/dl ('negative' risk factor for CHD)        LDL Reference Ranges  (U.S. Department of Health and Human Services ATP III Classifications)    Optimal          <100 mg/dL  Near Optimal     100-129 mg/dL  Borderline High  130-159 mg/dL  High             160-189 mg/dL  Very High        >189 mg/dL    High Sensitivity Troponin T 2Hr [[id number]]  (Abnormal) Collected: 04/23/23 2325    Specimen: Blood Updated: 04/24/23 0013     HS Troponin T 18 ng/L      Troponin T Delta 1 ng/L     Narrative:      High Sensitive Troponin T Reference Range:  <10.0 ng/L- Negative Female for AMI  <15.0 ng/L- Negative Male for AMI  >=10 - Abnormal Female indicating possible myocardial injury.  >=15 - Abnormal Male indicating possible myocardial injury.   Clinicians would have to utilize clinical acumen, EKG, Troponin, and serial changes to determine if it is an Acute Myocardial Infarction or myocardial injury due to an underlying chronic condition.         Gold Top - SST [[id number]] Collected: 04/23/23 2126    Specimen: Blood  Updated: 04/23/23 2231     Extra Tube Hold for add-ons.     Comment: Auto resulted.       Protime-INR [[id number]]  (Normal) Collected: 04/23/23 2121    Specimen: Blood Updated: 04/23/23 2151     Protime 12.5 Seconds      INR 0.95    Narrative:      Therapeutic range for most indications is 2.0-3.0 INR,  or 2.5-3.5 for mechanical heart valves.    aPTT [[id number]]  (Normal) Collected: 04/23/23 2121    Specimen: Blood Updated: 04/23/23 2151     PTT 26.5 seconds     Narrative:      The recommended Heparin therapeutic range is 68-97 seconds.    Magnesium [[id number]]  (Normal) Collected: 04/23/23 2121    Specimen: Blood Updated: 04/23/23 2148     Magnesium 2.0 mg/dL     Comprehensive Metabolic Panel [[id number]]  (Abnormal) Collected: 04/23/23 2121    Specimen: Blood Updated: 04/23/23 2148     Glucose 281 mg/dL      BUN 13 mg/dL      Creatinine 0.73 mg/dL      Sodium 140 mmol/L      Potassium 4.1 mmol/L      Chloride 103 mmol/L      CO2 27.0 mmol/L      Calcium 8.9 mg/dL      Total Protein 6.2 g/dL      Albumin 3.8 g/dL      ALT (SGPT) 34 U/L      AST (SGOT) 42 U/L      Alkaline Phosphatase 79 U/L      Total Bilirubin <0.2 mg/dL      Globulin 2.4 gm/dL      A/G Ratio 1.6 g/dL      BUN/Creatinine Ratio 17.8     Anion Gap 10.0 mmol/L      eGFR 101.6 mL/min/1.73     Narrative:      GFR Normal >60  Chronic Kidney Disease <60  Kidney Failure <15      High Sensitivity Troponin T [[id number]]  (Abnormal) Collected: 04/23/23 2121    Specimen: Blood Updated: 04/23/23 2146     HS Troponin T 17 ng/L     Narrative:      High Sensitive Troponin T Reference Range:  <10.0 ng/L- Negative Female for AMI  <15.0 ng/L- Negative Male for AMI  >=10 - Abnormal Female indicating possible myocardial injury.  >=15 - Abnormal Male indicating possible myocardial injury.   Clinicians would have to utilize clinical acumen, EKG, Troponin, and serial changes to determine if it is an Acute Myocardial Infarction or myocardial injury due to an underlying  chronic condition.         BNP [[id number]]  (Normal) Collected: 04/23/23 2121    Specimen: Blood Updated: 04/23/23 2146     proBNP 52.5 pg/mL     Narrative:      Among patients with dyspnea, NT-proBNP is highly sensitive for the detection of acute congestive heart failure. In addition NT-proBNP of <300 pg/ml effectively rules out acute congestive heart failure with 99% negative predictive value.      CBC & Differential [[id number]]  (Abnormal) Collected: 04/23/23 2121    Specimen: Blood Updated: 04/23/23 2128    Narrative:      The following orders were created for panel order CBC & Differential.  Procedure                               Abnormality         Status                     ---------                               -----------         ------                     CBC Auto Differential[[id number]]        Abnormal            Final result                 Please view results for these tests on the individual orders.    CBC Auto Differential [[id number]]  (Abnormal) Collected: 04/23/23 2121    Specimen: Blood Updated: 04/23/23 2128     WBC 9.32 10*3/mm3      RBC 3.49 10*6/mm3      Hemoglobin 10.2 g/dL      Hematocrit 32.2 %      MCV 92.3 fL      MCH 29.2 pg      MCHC 31.7 g/dL      RDW 15.9 %      RDW-SD 53.2 fl      MPV 9.9 fL      Platelets 403 10*3/mm3      Neutrophil % 53.4 %      Lymphocyte % 34.3 %      Monocyte % 10.1 %      Eosinophil % 0.9 %      Basophil % 0.8 %      Immature Grans % 0.5 %      Neutrophils, Absolute 4.98 10*3/mm3      Lymphocytes, Absolute 3.20 10*3/mm3      Monocytes, Absolute 0.94 10*3/mm3      Eosinophils, Absolute 0.08 10*3/mm3      Basophils, Absolute 0.07 10*3/mm3      Immature Grans, Absolute 0.05 10*3/mm3      nRBC 0.0 /100 WBC           I personally viewed and interpreted the patient's EKG/Telemetry data       Assessment/Plan:       Chest pain described as nagging discomfort in epigastric and chest area    62 years old patient who hospitalized for evaluation chest discomfort as  described below.  EKG no acute ST-T wave changes high sensitive troponin is nondiagnostic.  Given the risk factor of hypertension, hyperlipidemia, longstanding history of diabetes and further risk stratification discussed with the patient and the family.  His wife has a power of  she want to pursued further evaluation.  I have discussed to risk stratify with a CT coronary angiogram.  Procedure risk pros and cons explained understand willing to proceed forward..  Continue aspirin continue statin continue beta-blocker    #2 hyperlipidemia    Continue statin    #3 hypertension continue metoprolol    4 longstanding history of diabetes management as per hospital            This document has been electronically signed by Viola Watkins MD on April 24, 2023 11:50 CDT            Thank you for allowing me to participate in the care of Sandoval Navarro. Feel free to contact me directly with any further questions or concerns.    Viola Watkins MD  04/24/23  11:42 CDT.      Part of this note may be an electronic transcription/translation of spoken language to printed text using the Dragon Dictation system.

## 2023-04-24 NOTE — ED NOTES
"Nursing report ED to floor  Sandoval Navarro  62 y.o.  male    HPI:   Chief Complaint   Patient presents with    Chest Pain       Admitting doctor:   Hardy Birch MD    Consulting provider(s):  Consults       Date and Time Order Name Status Description    4/23/2023 11:25 PM Hospitalist (on-call MD unless specified)               Admitting diagnosis:   The encounter diagnosis was Chest pain, unspecified type.    Code status:   Current Code Status       Date Active Code Status Order ID Comments User Context       Prior            Allergies:   Patient has no known allergies.    Intake and Output  No intake or output data in the 24 hours ending 04/23/23 2340    Weight:       04/23/23 1955   Weight: 80.3 kg (177 lb)       Most recent vitals:   Vitals:    04/23/23 1955 04/23/23 2204 04/23/23 2325   BP: 111/67 121/68 121/68   BP Location:   Left arm   Patient Position:   Lying   Pulse: 75 82 68   Resp: 17 18 18   Temp: 97.4 °F (36.3 °C)     SpO2: 98% 96% 96%   Weight: 80.3 kg (177 lb)     Height: 188 cm (74\")       Oxygen Therapy: room air    Active LDAs/IV Access:   Lines, Drains & Airways       Active LDAs       Name Placement date Placement time Site Days    Peripheral IV 04/06/23 1813 Posterior;Right Forearm 04/06/23 1813  Forearm  17    Peripheral IV 04/06/23 2330 Posterior;Right Forearm 04/06/23 2330  Forearm  17    Peripheral IV 04/23/23 2122 Right Antecubital 04/23/23 2122  Antecubital  less than 1                    Labs (abnormal labs have a star):   Labs Reviewed   COMPREHENSIVE METABOLIC PANEL - Abnormal; Notable for the following components:       Result Value    Glucose 281 (*)     Creatinine 0.73 (*)     AST (SGOT) 42 (*)     All other components within normal limits    Narrative:     GFR Normal >60  Chronic Kidney Disease <60  Kidney Failure <15     TROPONIN - Abnormal; Notable for the following components:    HS Troponin T 17 (*)     All other components within normal limits    Narrative:     High " Sensitive Troponin T Reference Range:  <10.0 ng/L- Negative Female for AMI  <15.0 ng/L- Negative Male for AMI  >=10 - Abnormal Female indicating possible myocardial injury.  >=15 - Abnormal Male indicating possible myocardial injury.   Clinicians would have to utilize clinical acumen, EKG, Troponin, and serial changes to determine if it is an Acute Myocardial Infarction or myocardial injury due to an underlying chronic condition.        CBC WITH AUTO DIFFERENTIAL - Abnormal; Notable for the following components:    RBC 3.49 (*)     Hemoglobin 10.2 (*)     Hematocrit 32.2 (*)     RDW 15.9 (*)     Lymphocytes, Absolute 3.20 (*)     Monocytes, Absolute 0.94 (*)     All other components within normal limits   PROTIME-INR - Normal    Narrative:     Therapeutic range for most indications is 2.0-3.0 INR,  or 2.5-3.5 for mechanical heart valves.   APTT - Normal    Narrative:     The recommended Heparin therapeutic range is 68-97 seconds.   BNP (IN-HOUSE) - Normal    Narrative:     Among patients with dyspnea, NT-proBNP is highly sensitive for the detection of acute congestive heart failure. In addition NT-proBNP of <300 pg/ml effectively rules out acute congestive heart failure with 99% negative predictive value.     MAGNESIUM - Normal   HIGH SENSITIVITIY TROPONIN T 2HR   CBC AND DIFFERENTIAL    Narrative:     The following orders were created for panel order CBC & Differential.  Procedure                               Abnormality         Status                     ---------                               -----------         ------                     CBC Auto Differential[[id number]]        Abnormal            Final result                 Please view results for these tests on the individual orders.   GOLD TOP - SST   EXTRA TUBES    Narrative:     The following orders were created for panel order Extra Tubes.  Procedure                               Abnormality         Status                     ---------                                -----------         ------                     Gold Top - [facility][[id number]]                                   Final result               Birch Top[[id number]]                                         In process                   Please view results for these tests on the individual orders.   GRAY TOP       Meds given in ED:   Medications   sodium chloride 0.9 % flush 10 mL (has no administration in time range)   sodium chloride 0.9 % infusion (125 mL/hr Intravenous Currently Infusing 4/23/23 2234)   nitroglycerin (NITROSTAT) SL tablet 0.4 mg (has no administration in time range)   aspirin chewable tablet 324 mg (324 mg Oral Given 4/23/23 2151)   iopamidol (ISOVUE-370) 76 % injection 90 mL (60 mL Intravenous Given 4/23/23 2200)   insulin regular (humuLIN R,novoLIN R) injection 2 Units (2 Units Subcutaneous Given 4/23/23 2337)     sodium chloride, 125 mL/hr, Last Rate: 125 mL/hr (04/23/23 2234)         NIH Stroke Scale:       Isolation/Infection(s):  No active isolations   No active infections     COVID Testing  Collected NA  Resulted NA    Nursing report ED to floor:  Mental status: A/O  Ambulatory status: pt has not walked while in ER; hx CVA with left side weakness  Precautions: high risk fall    ED nurse phone extentsicz- [phone]

## 2023-04-25 LAB
ANION GAP SERPL CALCULATED.3IONS-SCNC: 7 MMOL/L (ref 5–15)
BASOPHILS # BLD AUTO: 0.13 10*3/MM3 (ref 0–0.2)
BASOPHILS NFR BLD AUTO: 1.5 % (ref 0–1.5)
BUN SERPL-MCNC: 15 MG/DL (ref 8–23)
BUN/CREAT SERPL: 25 (ref 7–25)
CALCIUM SPEC-SCNC: 8.8 MG/DL (ref 8.6–10.5)
CHLORIDE SERPL-SCNC: 105 MMOL/L (ref 98–107)
CO2 SERPL-SCNC: 26 MMOL/L (ref 22–29)
CREAT SERPL-MCNC: 0.6 MG/DL (ref 0.76–1.27)
DEPRECATED RDW RBC AUTO: 53.9 FL (ref 37–54)
EGFRCR SERPLBLD CKD-EPI 2021: 107.8 ML/MIN/1.73
EOSINOPHIL # BLD AUTO: 0.16 10*3/MM3 (ref 0–0.4)
EOSINOPHIL NFR BLD AUTO: 1.8 % (ref 0.3–6.2)
ERYTHROCYTE [DISTWIDTH] IN BLOOD BY AUTOMATED COUNT: 15.9 % (ref 12.3–15.4)
GLUCOSE BLDC GLUCOMTR-MCNC: 173 MG/DL (ref 70–130)
GLUCOSE BLDC GLUCOMTR-MCNC: 183 MG/DL (ref 70–130)
GLUCOSE BLDC GLUCOMTR-MCNC: 219 MG/DL (ref 70–130)
GLUCOSE BLDC GLUCOMTR-MCNC: 408 MG/DL (ref 70–130)
GLUCOSE SERPL-MCNC: 187 MG/DL (ref 65–99)
HCT VFR BLD AUTO: 30.2 % (ref 37.5–51)
HGB BLD-MCNC: 9.7 G/DL (ref 13–17.7)
IMM GRANULOCYTES # BLD AUTO: 0.05 10*3/MM3 (ref 0–0.05)
IMM GRANULOCYTES NFR BLD AUTO: 0.6 % (ref 0–0.5)
LYMPHOCYTES # BLD AUTO: 2.78 10*3/MM3 (ref 0.7–3.1)
LYMPHOCYTES NFR BLD AUTO: 31.5 % (ref 19.6–45.3)
MAGNESIUM SERPL-MCNC: 1.9 MG/DL (ref 1.6–2.4)
MCH RBC QN AUTO: 29.7 PG (ref 26.6–33)
MCHC RBC AUTO-ENTMCNC: 32.1 G/DL (ref 31.5–35.7)
MCV RBC AUTO: 92.4 FL (ref 79–97)
MONOCYTES # BLD AUTO: 0.79 10*3/MM3 (ref 0.1–0.9)
MONOCYTES NFR BLD AUTO: 8.9 % (ref 5–12)
NEUTROPHILS NFR BLD AUTO: 4.92 10*3/MM3 (ref 1.7–7)
NEUTROPHILS NFR BLD AUTO: 55.7 % (ref 42.7–76)
NRBC BLD AUTO-RTO: 0 /100 WBC (ref 0–0.2)
PLATELET # BLD AUTO: 372 10*3/MM3 (ref 140–450)
PMV BLD AUTO: 10.4 FL (ref 6–12)
POTASSIUM SERPL-SCNC: 3.8 MMOL/L (ref 3.5–5.2)
QT INTERVAL: 394 MS
QTC INTERVAL: 445 MS
RBC # BLD AUTO: 3.27 10*6/MM3 (ref 4.14–5.8)
SODIUM SERPL-SCNC: 138 MMOL/L (ref 136–145)
WBC NRBC COR # BLD: 8.83 10*3/MM3 (ref 3.4–10.8)

## 2023-04-25 PROCEDURE — 63710000001 INSULIN ASPART PER 5 UNITS: Performed by: NURSE PRACTITIONER

## 2023-04-25 PROCEDURE — 97535 SELF CARE MNGMENT TRAINING: CPT

## 2023-04-25 PROCEDURE — 97110 THERAPEUTIC EXERCISES: CPT

## 2023-04-25 PROCEDURE — 82962 GLUCOSE BLOOD TEST: CPT

## 2023-04-25 PROCEDURE — 36415 COLL VENOUS BLD VENIPUNCTURE: CPT | Performed by: INTERNAL MEDICINE

## 2023-04-25 PROCEDURE — 85025 COMPLETE CBC W/AUTO DIFF WBC: CPT | Performed by: INTERNAL MEDICINE

## 2023-04-25 PROCEDURE — 99232 SBSQ HOSP IP/OBS MODERATE 35: CPT | Performed by: INTERNAL MEDICINE

## 2023-04-25 PROCEDURE — 80048 BASIC METABOLIC PNL TOTAL CA: CPT | Performed by: INTERNAL MEDICINE

## 2023-04-25 PROCEDURE — 63710000001 INSULIN DETEMIR PER 5 UNITS: Performed by: INTERNAL MEDICINE

## 2023-04-25 PROCEDURE — G0378 HOSPITAL OBSERVATION PER HR: HCPCS

## 2023-04-25 PROCEDURE — 25010000002 ENOXAPARIN PER 10 MG: Performed by: INTERNAL MEDICINE

## 2023-04-25 PROCEDURE — 83735 ASSAY OF MAGNESIUM: CPT | Performed by: INTERNAL MEDICINE

## 2023-04-25 PROCEDURE — 96372 THER/PROPH/DIAG INJ SC/IM: CPT

## 2023-04-25 RX ORDER — LANOLIN ALCOHOL/MO/W.PET/CERES
6 CREAM (GRAM) TOPICAL NIGHTLY PRN
Status: DISCONTINUED | OUTPATIENT
Start: 2023-04-25 | End: 2023-04-26 | Stop reason: HOSPADM

## 2023-04-25 RX ORDER — ATORVASTATIN CALCIUM 10 MG/1
10 TABLET, FILM COATED ORAL NIGHTLY
Status: DISCONTINUED | OUTPATIENT
Start: 2023-04-25 | End: 2023-04-26 | Stop reason: HOSPADM

## 2023-04-25 RX ORDER — ISOSORBIDE MONONITRATE 30 MG/1
30 TABLET, EXTENDED RELEASE ORAL
Status: DISCONTINUED | OUTPATIENT
Start: 2023-04-25 | End: 2023-04-26 | Stop reason: HOSPADM

## 2023-04-25 RX ORDER — ASPIRIN 81 MG/1
81 TABLET ORAL DAILY
Status: DISCONTINUED | OUTPATIENT
Start: 2023-04-25 | End: 2023-04-26 | Stop reason: HOSPADM

## 2023-04-25 RX ADMIN — ENOXAPARIN SODIUM 40 MG: 40 INJECTION SUBCUTANEOUS at 08:29

## 2023-04-25 RX ADMIN — Medication 12.5 MG: at 08:28

## 2023-04-25 RX ADMIN — Medication 400 MG: at 08:28

## 2023-04-25 RX ADMIN — PANTOPRAZOLE SODIUM 40 MG: 40 TABLET, DELAYED RELEASE ORAL at 06:12

## 2023-04-25 RX ADMIN — TAMSULOSIN HYDROCHLORIDE 0.4 MG: 0.4 CAPSULE ORAL at 08:28

## 2023-04-25 RX ADMIN — ATORVASTATIN CALCIUM 10 MG: 10 TABLET, FILM COATED ORAL at 20:17

## 2023-04-25 RX ADMIN — CANAGLIFLOZIN 100 MG: 100 TABLET, FILM COATED ORAL at 08:28

## 2023-04-25 RX ADMIN — ISOSORBIDE MONONITRATE 30 MG: 30 TABLET, EXTENDED RELEASE ORAL at 17:05

## 2023-04-25 RX ADMIN — ASPIRIN 81 MG: 81 TABLET, COATED ORAL at 12:10

## 2023-04-25 RX ADMIN — FLUTICASONE PROPIONATE 1 SPRAY: 50 SPRAY, METERED NASAL at 08:29

## 2023-04-25 RX ADMIN — INSULIN ASPART 4 UNITS: 100 INJECTION, SOLUTION INTRAVENOUS; SUBCUTANEOUS at 17:05

## 2023-04-25 RX ADMIN — HYDROXYZINE PAMOATE 25 MG: 25 CAPSULE ORAL at 08:28

## 2023-04-25 RX ADMIN — INSULIN ASPART 12 UNITS: 100 INJECTION, SOLUTION INTRAVENOUS; SUBCUTANEOUS at 12:10

## 2023-04-25 RX ADMIN — MELATONIN 6 MG: 3 TAB ORAL at 21:33

## 2023-04-25 RX ADMIN — HYDROXYZINE PAMOATE 25 MG: 25 CAPSULE ORAL at 20:20

## 2023-04-25 RX ADMIN — INSULIN ASPART 4 UNITS: 100 INJECTION, SOLUTION INTRAVENOUS; SUBCUTANEOUS at 08:28

## 2023-04-25 RX ADMIN — INSULIN DETEMIR 20 UNITS: 100 INJECTION, SOLUTION SUBCUTANEOUS at 20:17

## 2023-04-25 RX ADMIN — Medication 10 ML: at 08:29

## 2023-04-25 RX ADMIN — Medication 10 ML: at 20:18

## 2023-04-25 NOTE — PLAN OF CARE
Problem: Adult Inpatient Plan of Care  Goal: Plan of Care Review  Outcome: Ongoing, Progressing  Flowsheets  Taken 4/25/2023 1838 by Sisi Ahn RN  Progress: no change  Taken 4/25/2023 1228 by Belkis Regalado OT  Plan of Care Reviewed With: patient  Goal: Patient-Specific Goal (Individualized)  Outcome: Ongoing, Progressing  Goal: Absence of Hospital-Acquired Illness or Injury  Outcome: Ongoing, Progressing  Intervention: Identify and Manage Fall Risk  Recent Flowsheet Documentation  Taken 4/25/2023 1700 by Sisi Ahn RN  Safety Promotion/Fall Prevention: safety round/check completed  Taken 4/25/2023 1500 by Sisi Ahn RN  Safety Promotion/Fall Prevention: safety round/check completed  Taken 4/25/2023 1300 by Sisi Ahn RN  Safety Promotion/Fall Prevention: safety round/check completed  Taken 4/25/2023 1100 by Sisi Ahn RN  Safety Promotion/Fall Prevention: safety round/check completed  Taken 4/25/2023 0900 by Sisi Ahn RN  Safety Promotion/Fall Prevention: safety round/check completed  Taken 4/25/2023 0828 by Sisi Ahn RN  Safety Promotion/Fall Prevention: safety round/check completed  Taken 4/25/2023 0700 by Sisi Ahn RN  Safety Promotion/Fall Prevention: safety round/check completed  Intervention: Prevent Skin Injury  Recent Flowsheet Documentation  Taken 4/25/2023 1700 by Sisi Ahn RN  Body Position: position changed independently  Taken 4/25/2023 1500 by Sisi Ahn RN  Body Position: position changed independently  Taken 4/25/2023 1100 by Sisi Ahn RN  Body Position: position changed independently  Taken 4/25/2023 0900 by Sisi Ahn RN  Body Position: position changed independently  Taken 4/25/2023 0828 by Sisi Ahn RN  Body Position: position changed independently  Taken 4/25/2023 0700 by Sisi Ahn RN  Body Position: position changed independently  Intervention: Prevent and Manage VTE (Venous  Thromboembolism) Risk  Recent Flowsheet Documentation  Taken 4/25/2023 1700 by Sisi Ahn RN  Activity Management: activity encouraged  Taken 4/25/2023 1500 by Sisi Ahn RN  Activity Management: activity encouraged  Taken 4/25/2023 1300 by Sisi Ahn RN  Activity Management: activity encouraged  Taken 4/25/2023 1100 by Sisi Ahn RN  Activity Management: activity encouraged  Taken 4/25/2023 0828 by Sisi Ahn RN  Activity Management: activity encouraged  VTE Prevention/Management: (Lovenox) other (see comments)  Goal: Optimal Comfort and Wellbeing  Outcome: Ongoing, Progressing  Intervention: Provide Person-Centered Care  Recent Flowsheet Documentation  Taken 4/25/2023 0828 by Sisi Ahn RN  Trust Relationship/Rapport: care explained  Goal: Readiness for Transition of Care  Outcome: Ongoing, Progressing     Problem: Fall Injury Risk  Goal: Absence of Fall and Fall-Related Injury  Outcome: Ongoing, Progressing  Intervention: Promote Injury-Free Environment  Recent Flowsheet Documentation  Taken 4/25/2023 1700 by Sisi Ahn RN  Safety Promotion/Fall Prevention: safety round/check completed  Taken 4/25/2023 1500 by Sisi Ahn RN  Safety Promotion/Fall Prevention: safety round/check completed  Taken 4/25/2023 1300 by Sisi Ahn RN  Safety Promotion/Fall Prevention: safety round/check completed  Taken 4/25/2023 1100 by Sisi Ahn RN  Safety Promotion/Fall Prevention: safety round/check completed  Taken 4/25/2023 0900 by Sisi Ahn RN  Safety Promotion/Fall Prevention: safety round/check completed  Taken 4/25/2023 0828 by Sisi Ahn RN  Safety Promotion/Fall Prevention: safety round/check completed  Taken 4/25/2023 0700 by Sisi Ahn RN  Safety Promotion/Fall Prevention: safety round/check completed     Problem: Chest Pain  Goal: Resolution of Chest Pain Symptoms  Outcome: Ongoing, Progressing     Problem: Skin Injury Risk  Increased  Goal: Skin Health and Integrity  Outcome: Ongoing, Progressing  Intervention: Optimize Skin Protection  Recent Flowsheet Documentation  Taken 4/25/2023 1500 by Sisi Ahn, RN  Head of Bed (HOB) Positioning: HOB elevated  Taken 4/25/2023 1100 by Sisi Ahn, RN  Head of Bed (HOB) Positioning: HOB elevated  Taken 4/25/2023 0900 by Sisi Ahn, RN  Head of Bed (HOB) Positioning: HOB elevated  Taken 4/25/2023 0828 by Sisi Ahn, RN  Head of Bed (HOB) Positioning: HOB elevated   Goal Outcome Evaluation:           Progress: no change

## 2023-04-25 NOTE — PROGRESS NOTES
LOS: 0 days   Patient Care Team:  uGanako Atkins MD as PCP - General  Marcio Ortiz MD as Consulting Physician (Hematology and Oncology)  Tresa Yo APRN as Nurse Practitioner (Oncology)    Chief Complaint: 62 years old patient admitted for evaluation of chest pain risk stratify with a CT coronary angiogram no significant CAD was noted medical management was recommended continue aspirin and statin.  No intervention needed at this stage and low-dose Imdur can be considered       Review of Systems:     Constitution: Positive for activity change    HENT:  Denies any headache, hearing impairment.    Eyes:  Denies any blurring of vision     Cardiovascular:  As per history of present illness.     Respiratory: History of pulmonary embolism       Gastrointestinal: History of GI bleed    Extremity: No edema, positive pulses    Objective     Current Facility-Administered Medications   Medication Dose Route Frequency Provider Last Rate Last Admin   • acetaminophen (TYLENOL) tablet 650 mg  650 mg Oral Q4H PRN Hardy Birch MD        Or   • acetaminophen (TYLENOL) 160 MG/5ML solution 650 mg  650 mg Oral Q4H PRN Hardy Birch MD        Or   • acetaminophen (TYLENOL) suppository 650 mg  650 mg Rectal Q4H PRN Hardy Birch MD       • aspirin EC tablet 81 mg  81 mg Oral Daily Levill, Merari G, APRN   81 mg at 04/25/23 1210   • atorvastatin (LIPITOR) tablet 10 mg  10 mg Oral Nightly Levill, Merari G, APRN       • calcium carbonate (TUMS) chewable tablet 500 mg (200 mg elemental)  1 tablet Oral BID PRN Hardy Birch MD       • Canagliflozin (INVOKANA) tablet 100 mg  100 mg Oral Daily Hardy Birch MD   100 mg at 04/25/23 0828   • cyanocobalamin injection 1,000 mcg  1,000 mcg Intramuscular Q14 Days Hardy Birch MD   1,000 mcg at 04/24/23 0839   • dextrose (D50W) (25 g/50 mL) IV injection 25 g  25 g Intravenous Q15 Min PRN Hardy Birch MD       • dextrose (GLUTOSE) oral gel 15 g  15 g Oral Q15 Min PRN Hardy Birch MD        • Enoxaparin Sodium (LOVENOX) syringe 40 mg  40 mg Subcutaneous Q24H Hardy Birch MD   40 mg at 04/25/23 0829   • fluticasone (FLONASE) 50 MCG/ACT nasal spray 1 spray  1 spray Nasal Daily Hardy Birch MD   1 spray at 04/25/23 0829   • glucagon HCl (Diagnostic) injection 1 mg  1 mg Intramuscular Q15 Min PRN Hardy Birch MD       • hydrOXYzine pamoate (VISTARIL) capsule 25 mg  25 mg Oral TID PRN LevMerari lopez G, APRN   25 mg at 04/25/23 0828   • Insulin Aspart (novoLOG) injection 0-24 Units  0-24 Units Subcutaneous TID AC LevMerari lopez G, APRN   12 Units at 04/25/23 1210   • insulin detemir (LEVEMIR) injection 20 Units  20 Units Subcutaneous Nightly Hardy Birch MD   20 Units at 04/24/23 2040   • magnesium oxide (MAG-OX) tablet 400 mg  400 mg Oral Daily Hardy Birch MD   400 mg at 04/25/23 0828   • metoprolol succinate XL (TOPROL-XL) 24 hr half tablet 12.5 mg  12.5 mg Oral Q24H Hardy Birch MD   12.5 mg at 04/25/23 0828   • morphine injection 2 mg  2 mg Intravenous Q4H PRN Hardy Birch MD   2 mg at 04/24/23 2210    And   • naloxone (NARCAN) injection 0.4 mg  0.4 mg Intravenous Q5 Min PRN Hardy Birch MD       • morphine injection 4 mg  4 mg Intravenous Q4H PRN Hardy Birch MD        And   • naloxone (NARCAN) injection 0.4 mg  0.4 mg Intravenous Q5 Min PRN Hardy Birch MD       • nitroglycerin (NITROSTAT) SL tablet 0.4 mg  0.4 mg Sublingual Q5 Min PRN Nelson Brand MD   0.4 mg at 04/24/23 1717   • ondansetron (ZOFRAN) injection 4 mg  4 mg Intravenous Q6H PRN Hardy Birch MD       • pantoprazole (PROTONIX) EC tablet 40 mg  40 mg Oral Q AM Hardy Birch MD   40 mg at 04/25/23 0612   • sodium chloride 0.9 % flush 10 mL  10 mL Intravenous PRN Nelson Brand MD       • sodium chloride 0.9 % flush 10 mL  10 mL Intravenous Q12H Hardy Birch MD   10 mL at 04/25/23 0829   • sodium chloride 0.9 % flush 10 mL  10 mL Intravenous PRN Hardy Birch MD       • sodium chloride 0.9 % infusion 40 mL  40  "mL Intravenous PRN Hardy Birch MD       • tamsulosin (FLOMAX) 24 hr capsule 0.4 mg  0.4 mg Oral Daily Hardy Birch MD   0.4 mg at 04/25/23 0828       Vital Sign Min/Max for last 24 hours  Temp  Min: 96.7 °F (35.9 °C)  Max: 98.3 °F (36.8 °C)   BP  Min: 82/50  Max: 161/73   Pulse  Min: 64  Max: 96   Resp  Min: 16  Max: 20   SpO2  Min: 94 %  Max: 96 %   No data recorded   Weight  Min: 77.3 kg (170 lb 6.4 oz)  Max: 77.3 kg (170 lb 6.4 oz)     Flowsheet Rows    Flowsheet Row First Filed Value   Admission Height 188 cm (74\") Documented at 04/23/2023 1955   Admission Weight 80.3 kg (177 lb) Documented at 04/23/2023 1955            Intake/Output Summary (Last 24 hours) at 4/25/2023 1324  Last data filed at 4/25/2023 1218  Gross per 24 hour   Intake 1600 ml   Output 1375 ml   Net 225 ml       Physical Exam:  Neck: Supple.  No JVD, no thyroid enlargement.  Chest: Air entry equal, normal respiration.  No rhonchi or creps.  Cardiovascular system:  Regular rate and rhythm, no murmurs.  Abdomen: Soft, no tenderness, bowel sounds present, no   CNS: Alert, oriented to place and time.       Results Review:   I reviewed the patient's new clinical results.  Lab Results   Component Value Date    WBC 8.83 04/25/2023    HGB 9.7 (L) 04/25/2023    HCT 30.2 (L) 04/25/2023    MCV 92.4 04/25/2023     04/25/2023     Lab Results   Component Value Date    GLUCOSE 187 (H) 04/25/2023    BUN 15 04/25/2023    CREATININE 0.60 (L) 04/25/2023    EGFRIFNONA 86 08/27/2020    EGFRIFAFRI 103 09/09/2021    BCR 25.0 04/25/2023    CO2 26.0 04/25/2023    CALCIUM 8.8 04/25/2023    ALBUMIN 3.8 04/23/2023    LABIL2 1.5 04/07/2023    AST 42 (H) 04/23/2023    ALT 34 04/23/2023     Lab Results   Component Value Date    TSH 1.630 04/03/2023     Lab Results   Component Value Date    INR 0.95 04/23/2023    INR 1.13 04/06/2023    INR 0.96 02/22/2023    PROTIME 12.5 04/23/2023    PROTIME 14.5 04/06/2023    PROTIME 10.4 02/22/2023     Lab Results   Component " Value Date    PTT 26.5 04/23/2023       EKG:     Telemetry:    Ejection Fraction  Lab Results   Component Value Date    EF 59 08/10/2018       Echo EF Estimated  No results found for: ECHOEFEST      Present on Admission:  • Chest pain, unspecified type    Assessment & Plan   #1 chest pain    Patient admitted for evaluation chest pain described as nagging discomfort in epigastric and chest area risk stratify with CT coronary angiogram.  There is nonobstructive coronary artery disease no significant disease processes was reported.  Patient preserved left ventricle systolic function.  Continue statin continue aspirin and continue    #2 hyperlipidemia  Continue statin    #3 hypertension  Continue metoprolol    We will see on as needed basis during hospitalization and 3 to 4-month in the office            This document has been electronically signed by Viola Watkins MD on April 25, 2023 13:27 CDT        Viola Watkins MD  04/25/23  13:24 CDT      Part of this note may be an electronic transcription/translation of spoken language to printed text using the Dragon Dictation system.

## 2023-04-25 NOTE — PROGRESS NOTES
[facility] Medicine Services  INPATIENT PROGRESS NOTE    Length of Stay: 0  Date of Admission: 4/23/2023  Primary Care Physician: Guanako Atkins MD    Subjective   Chief Complaint: No complaints    HPI: This is a 62-year-old male with past medical history of COPD, DM 2, GERD, gout, HLD, sleep apnea and stroke that presented to [facility] on 4/23/2023 with complaints of chest pain and shortness of air.    Patient was recently discharged on 4/18/2023 for a pulmonary embolus and GI bleeding issues.  CTA of the chest on this admission shows resolution of pulmonary embolus.    Review of Systems   Constitutional: Negative for activity change and fatigue.   HENT: Negative for ear pain and sore throat.    Eyes: Negative for pain and discharge.   Respiratory: Positive for shortness of breath. Negative for cough.    Cardiovascular: Positive for chest pain. Negative for palpitations.   Gastrointestinal: Negative for abdominal pain and nausea.   Endocrine: Negative for cold intolerance and heat intolerance.   Genitourinary: Negative for difficulty urinating and dysuria.   Musculoskeletal: Negative for arthralgias and gait problem.   Skin: Negative for color change and rash.   Neurological: Negative for dizziness and weakness.   Psychiatric/Behavioral: Negative for agitation and confusion.        Objective    Temp:  [96.7 °F (35.9 °C)-98.3 °F (36.8 °C)] 96.7 °F (35.9 °C)  Heart Rate:  [64-96] 65  Resp:  [16-20] 16  BP: ()/(50-76) 110/71    Physical Exam  Constitutional:       Appearance: He is well-developed.   HENT:      Head: Normocephalic and atraumatic.   Eyes:      Pupils: Pupils are equal, round, and reactive to light.   Cardiovascular:      Rate and Rhythm: Normal rate and regular rhythm.   Pulmonary:      Effort: Pulmonary effort is normal.      Breath sounds: Normal breath sounds.   Abdominal:      General: Bowel sounds are normal.      Palpations: Abdomen is soft.   Musculoskeletal:          General: Normal range of motion.      Cervical back: Normal range of motion and neck supple.   Skin:     General: Skin is warm and dry.   Neurological:      Mental Status: He is alert and oriented to person, place, and time.   Psychiatric:         Behavior: Behavior normal.       Results Review:  I have reviewed the labs, radiology results, and diagnostic studies.    Laboratory Data:   Results from last 7 days   Lab Units 04/25/23  0534 04/24/23  0350 04/23/23 2121   SODIUM mmol/L 138 140 140   POTASSIUM mmol/L 3.8 4.0 4.1   CHLORIDE mmol/L 105 106 103   CO2 mmol/L 26.0 23.0 27.0   BUN mg/dL 15 12 13   CREATININE mg/dL 0.60* 0.54* 0.73*   GLUCOSE mg/dL 187* 215* 281*   CALCIUM mg/dL 8.8 9.0 8.9   BILIRUBIN mg/dL  --   --  <0.2   ALK PHOS U/L  --   --  79   ALT (SGPT) U/L  --   --  34   AST (SGOT) U/L  --   --  42*   ANION GAP mmol/L 7.0 11.0 10.0     Estimated Creatinine Clearance: 136 mL/min (A) (by C-G formula based on SCr of 0.6 mg/dL (L)).  Results from last 7 days   Lab Units 04/25/23  0534 04/23/23 2121   MAGNESIUM mg/dL 1.9 2.0         Results from last 7 days   Lab Units 04/25/23  0534 04/23/23 2121 04/22/23  0944   WBC 10*3/mm3 8.83 9.32 8.35   HEMOGLOBIN g/dL 9.7* 10.2* 9.2*   HEMATOCRIT % 30.2* 32.2* 29.9*   PLATELETS 10*3/mm3 372 403 335     Results from last 7 days   Lab Units 04/23/23 2121   INR  0.95       Culture Data:   No results found for: BLOODCX  No results found for: URINECX  No results found for: RESPCX  No results found for: WOUNDCX  No results found for: STOOLCX  No components found for: BODYFLD    Radiology Data:   Imaging Results (Last 24 Hours)     Procedure Component Value Units Date/Time    CT Angiogram Coronary [[id number]] Collected: 04/24/23 1545     Updated: 04/24/23 1640    Narrative:      HISTORY:  Chest pain.    COMPARISON:  4/23/2023    TECHNIQUE:  Axial images through the heart were completed without contrast.  Then, IV  contrast was administered and axial images  through the heart were completed.  3D  multiplanar and gated reformats of the coronary arteries were completed on a  separate work station under concurrent supervision.    FINDINGS:  Incidental findings: There are no suspicious incidental findings.    Calcium score: Calcium score is 734.    Function: Ejection fraction is estimated at 62%.  Wall motion is normal.    Left main: The left main coronary artery arises from the left coronary cusp.  There is no plaque or stenosis.    LAD: Left anterior descending coronary artery arises from the left main coronary  artery.  There is a moderate degree of eccentric calcified plaque within the  proximal midportion of the vessel without high-grade stenosis.    LCx: Left circumflex coronary artery arises from the left main coronary artery.  The vessel is diminutive.  There is no plaque or high-grade stenosis.    RCA: The right coronary artery is partially obscured by motion and streak  artifact.  There is eccentric calcified plaque, but no high-grade stenosis is  evident.  The right coronary artery supplies the PDA.      Impression:      1.  Eccentric calcified plaque within the proximal and mid LAD and RCA without  definite high-grade stenoses.  Portions of the RCA are obscured by motion and  streak artifact.    2.  Calcium score 734.    3.  Ejection fraction estimated at 62%.          I have reviewed the patient's current medications.     Assessment/Plan     Active Hospital Problems    Diagnosis    • **Chest pain, unspecified type        Plan:    1.  Pulmonary embolus, resolved: Previously diagnosed.  CTA of the chest done shows resolution.  Anticoagulation was discontinued secondary to GI bleeding episode on 4/18/2023.  No evidence of DVT in bilateral lower extremities.  2.  Chest pain, rule out ACS: Serial cardiac enzymes, EKG and continuous telemetry.  CTA of the coronary arteries showed no high grade stenosis.  Cardiology consult appreciated.  3.  Diabetes mellitus, type  II: Continue SSI and long-acting.  4.  GERD: Continue PPI.    VTE prophylaxis: Lovenox.      Medical Decision Making  Number and Complexity of problems: 4/high  Differential Diagnosis: GERD    Conditions and Status:        Condition is improving.     [facility] Data  External documents reviewed: Yes  My EKG interpretation: Normal sinus rhythm  My CT interpretation: Resolution of previous noted pulmonary emboli.  Tests considered but not ordered: None     Decision rules/scores evaluated (example [id number], Wells, etc): Heart     Discussed with: Patient     Treatment Plan  As above    Care Planning  Shared decision making: Yes  Code status and discussions: Full    Disposition  Social Determinants of Health that impact treatment or disposition: None  I expect the patient to be discharged to home in 1-2 days. SNF precert pending.     I confirmed that the patient's Advance Care Plan is present, code status is documented, or surrogate decision maker is listed in the patient's medical record.      I have utilized all available immediate resources to obtain, update, or review the patient's current medications.         This document has been electronically signed by NIKIA Garza on April 25, 2023 11:42 CDT

## 2023-04-25 NOTE — SIGNIFICANT NOTE
04/25/23 1424   OTHER   Discipline physical therapy assistant   Rehab Time/Intention   Session Not Performed other (see comments)  (Pt declined tx at this time due to with visitor and wanting to make phone call. Will check back as time permits.)

## 2023-04-25 NOTE — PLAN OF CARE
Problem: Adult Inpatient Plan of Care  Goal: Plan of Care Review  Outcome: Ongoing, Progressing  Goal: Patient-Specific Goal (Individualized)  Outcome: Ongoing, Progressing  Goal: Absence of Hospital-Acquired Illness or Injury  Outcome: Ongoing, Progressing  Intervention: Identify and Manage Fall Risk  Intervention: Prevent Skin Injury  Intervention: Prevent and Manage VTE (Venous Thromboembolism) Risk  Goal: Optimal Comfort and Wellbeing  Outcome: Ongoing, Progressing  Goal: Readiness for Transition of Care  Outcome: Ongoing, Progressing     Problem: Fall Injury Risk  Goal: Absence of Fall and Fall-Related Injury  Outcome: Ongoing, Progressing  Intervention: Promote Injury-Free Environment     Problem: Chest Pain  Goal: Resolution of Chest Pain Symptoms  Outcome: Ongoing, Progressing     Problem: Skin Injury Risk Increased  Goal: Skin Health and Integrity  Outcome: Ongoing, Progressing  Intervention: Optimize Skin Protection   Goal Outcome Evaluation:         Patient incontinent at times. Restless. VSS. Plan of care continues.

## 2023-04-25 NOTE — PLAN OF CARE
Goal Outcome Evaluation:  Plan of Care Reviewed With: patient           Outcome Evaluation: OT tx completed. Pt sitting up in bed upon arrival. Alert and agreeable to therapy. Pt completed sup to sit with SBA. Pt sat EOB with SBS. Pt completed LB bathing and dressing with Max A. Pt completed UB bathing and dressing with Mod A. Pt completed sit <> stand with FWW with CGA. Pt took x2 steps towards the HOB with Min A and FWW. Pt returned to sup with SBA. Pt completed RUE strengthening exercises with 1lb weight in all planes. OTR worked with pt on positioning of LUE. Pt was left supine in bed with exit alarm on and all needs in reach. Cont OT POC. Anticipate return to SNf at d/c with therapy.

## 2023-04-26 VITALS
DIASTOLIC BLOOD PRESSURE: 52 MMHG | TEMPERATURE: 97.8 F | WEIGHT: 171.8 LBS | BODY MASS INDEX: 22.05 KG/M2 | HEIGHT: 74 IN | OXYGEN SATURATION: 95 % | RESPIRATION RATE: 18 BRPM | SYSTOLIC BLOOD PRESSURE: 110 MMHG | HEART RATE: 82 BPM

## 2023-04-26 LAB
ANION GAP SERPL CALCULATED.3IONS-SCNC: 9 MMOL/L (ref 5–15)
BASOPHILS # BLD AUTO: 0.09 10*3/MM3 (ref 0–0.2)
BASOPHILS NFR BLD AUTO: 1.1 % (ref 0–1.5)
BUN SERPL-MCNC: 16 MG/DL (ref 8–23)
BUN/CREAT SERPL: 23.5 (ref 7–25)
CALCIUM SPEC-SCNC: 9.2 MG/DL (ref 8.6–10.5)
CHLORIDE SERPL-SCNC: 104 MMOL/L (ref 98–107)
CO2 SERPL-SCNC: 25 MMOL/L (ref 22–29)
CREAT SERPL-MCNC: 0.68 MG/DL (ref 0.76–1.27)
DEPRECATED RDW RBC AUTO: 51.5 FL (ref 37–54)
EGFRCR SERPLBLD CKD-EPI 2021: 103.8 ML/MIN/1.73
EOSINOPHIL # BLD AUTO: 0.16 10*3/MM3 (ref 0–0.4)
EOSINOPHIL NFR BLD AUTO: 1.9 % (ref 0.3–6.2)
ERYTHROCYTE [DISTWIDTH] IN BLOOD BY AUTOMATED COUNT: 15.9 % (ref 12.3–15.4)
GLUCOSE BLDC GLUCOMTR-MCNC: 195 MG/DL (ref 70–130)
GLUCOSE BLDC GLUCOMTR-MCNC: 215 MG/DL (ref 70–130)
GLUCOSE BLDC GLUCOMTR-MCNC: 251 MG/DL (ref 70–130)
GLUCOSE BLDC GLUCOMTR-MCNC: 259 MG/DL (ref 70–130)
GLUCOSE SERPL-MCNC: 189 MG/DL (ref 65–99)
HCT VFR BLD AUTO: 30.5 % (ref 37.5–51)
HGB BLD-MCNC: 10.1 G/DL (ref 13–17.7)
IMM GRANULOCYTES # BLD AUTO: 0.04 10*3/MM3 (ref 0–0.05)
IMM GRANULOCYTES NFR BLD AUTO: 0.5 % (ref 0–0.5)
LYMPHOCYTES # BLD AUTO: 2.62 10*3/MM3 (ref 0.7–3.1)
LYMPHOCYTES NFR BLD AUTO: 31.2 % (ref 19.6–45.3)
MAGNESIUM SERPL-MCNC: 1.9 MG/DL (ref 1.6–2.4)
MCH RBC QN AUTO: 29.4 PG (ref 26.6–33)
MCHC RBC AUTO-ENTMCNC: 33.1 G/DL (ref 31.5–35.7)
MCV RBC AUTO: 88.9 FL (ref 79–97)
MONOCYTES # BLD AUTO: 0.67 10*3/MM3 (ref 0.1–0.9)
MONOCYTES NFR BLD AUTO: 8 % (ref 5–12)
NEUTROPHILS NFR BLD AUTO: 4.81 10*3/MM3 (ref 1.7–7)
NEUTROPHILS NFR BLD AUTO: 57.3 % (ref 42.7–76)
NRBC BLD AUTO-RTO: 0 /100 WBC (ref 0–0.2)
PLATELET # BLD AUTO: 411 10*3/MM3 (ref 140–450)
PMV BLD AUTO: 10.3 FL (ref 6–12)
POTASSIUM SERPL-SCNC: 4 MMOL/L (ref 3.5–5.2)
RBC # BLD AUTO: 3.43 10*6/MM3 (ref 4.14–5.8)
SARS-COV-2 RNA RESP QL NAA+PROBE: NOT DETECTED
SODIUM SERPL-SCNC: 138 MMOL/L (ref 136–145)
WBC NRBC COR # BLD: 8.39 10*3/MM3 (ref 3.4–10.8)

## 2023-04-26 PROCEDURE — G0378 HOSPITAL OBSERVATION PER HR: HCPCS

## 2023-04-26 PROCEDURE — 83735 ASSAY OF MAGNESIUM: CPT | Performed by: INTERNAL MEDICINE

## 2023-04-26 PROCEDURE — 96372 THER/PROPH/DIAG INJ SC/IM: CPT

## 2023-04-26 PROCEDURE — 25010000002 ENOXAPARIN PER 10 MG: Performed by: INTERNAL MEDICINE

## 2023-04-26 PROCEDURE — 85025 COMPLETE CBC W/AUTO DIFF WBC: CPT | Performed by: INTERNAL MEDICINE

## 2023-04-26 PROCEDURE — 63710000001 INSULIN ASPART PER 5 UNITS: Performed by: NURSE PRACTITIONER

## 2023-04-26 PROCEDURE — 36415 COLL VENOUS BLD VENIPUNCTURE: CPT | Performed by: INTERNAL MEDICINE

## 2023-04-26 PROCEDURE — 87635 SARS-COV-2 COVID-19 AMP PRB: CPT | Performed by: NURSE PRACTITIONER

## 2023-04-26 PROCEDURE — 82962 GLUCOSE BLOOD TEST: CPT

## 2023-04-26 PROCEDURE — 80048 BASIC METABOLIC PNL TOTAL CA: CPT | Performed by: INTERNAL MEDICINE

## 2023-04-26 RX ORDER — PANTOPRAZOLE SODIUM 40 MG/1
40 TABLET, DELAYED RELEASE ORAL
Qty: 30 TABLET | Refills: 3 | Status: SHIPPED | OUTPATIENT
Start: 2023-04-27

## 2023-04-26 RX ORDER — ISOSORBIDE MONONITRATE 30 MG/1
30 TABLET, EXTENDED RELEASE ORAL
Qty: 30 TABLET | Refills: 3 | Status: SHIPPED | OUTPATIENT
Start: 2023-04-26

## 2023-04-26 RX ADMIN — ENOXAPARIN SODIUM 40 MG: 40 INJECTION SUBCUTANEOUS at 08:20

## 2023-04-26 RX ADMIN — FLUTICASONE PROPIONATE 1 SPRAY: 50 SPRAY, METERED NASAL at 08:16

## 2023-04-26 RX ADMIN — ISOSORBIDE MONONITRATE 30 MG: 30 TABLET, EXTENDED RELEASE ORAL at 08:21

## 2023-04-26 RX ADMIN — INSULIN ASPART 4 UNITS: 100 INJECTION, SOLUTION INTRAVENOUS; SUBCUTANEOUS at 08:07

## 2023-04-26 RX ADMIN — Medication 400 MG: at 08:21

## 2023-04-26 RX ADMIN — PANTOPRAZOLE SODIUM 40 MG: 40 TABLET, DELAYED RELEASE ORAL at 05:46

## 2023-04-26 RX ADMIN — CANAGLIFLOZIN 100 MG: 100 TABLET, FILM COATED ORAL at 08:21

## 2023-04-26 RX ADMIN — ASPIRIN 81 MG: 81 TABLET, COATED ORAL at 08:21

## 2023-04-26 RX ADMIN — Medication 12.5 MG: at 08:21

## 2023-04-26 RX ADMIN — Medication 10 ML: at 08:21

## 2023-04-26 RX ADMIN — TAMSULOSIN HYDROCHLORIDE 0.4 MG: 0.4 CAPSULE ORAL at 08:21

## 2023-04-26 RX ADMIN — INSULIN ASPART 4 UNITS: 100 INJECTION, SOLUTION INTRAVENOUS; SUBCUTANEOUS at 11:01

## 2023-04-26 NOTE — DISCHARGE SUMMARY
[facility] Medicine Services  DISCHARGE SUMMARY       Date of Admission: 4/23/2023  Date of Discharge:  4/26/2023  Primary Care Physician: Guanako Atkins MD    Presenting Problem/History of Present Illness:  Chest pain, unspecified type [R07.9]       Final Discharge Diagnoses:  Active Hospital Problems    Diagnosis    • **Chest pain, unspecified type        Consults:   Consults     Date and Time Order Name Status Description    4/23/2023 11:55 PM Inpatient Cardiology Consult Completed     4/23/2023 11:25 PM Hospitalist (on-call MD unless specified)        Pertinent Test Results:   Lab Results (last 24 hours)     Procedure Component Value Units Date/Time    Magnesium [[id number]]  (Normal) Collected: 04/26/23 0632    Specimen: Blood Updated: 04/26/23 0728     Magnesium 1.9 mg/dL     Basic Metabolic Panel [[id number]]  (Abnormal) Collected: 04/26/23 0632    Specimen: Blood Updated: 04/26/23 0728     Glucose 189 mg/dL      BUN 16 mg/dL      Creatinine 0.68 mg/dL      Sodium 138 mmol/L      Potassium 4.0 mmol/L      Chloride 104 mmol/L      CO2 25.0 mmol/L      Calcium 9.2 mg/dL      BUN/Creatinine Ratio 23.5     Anion Gap 9.0 mmol/L      eGFR 103.8 mL/min/1.73     Narrative:      GFR Normal >60  Chronic Kidney Disease <60  Kidney Failure <15      CBC & Differential [[id number]]  (Abnormal) Collected: 04/26/23 0632    Specimen: Blood Updated: 04/26/23 0710    Narrative:      The following orders were created for panel order CBC & Differential.  Procedure                               Abnormality         Status                     ---------                               -----------         ------                     CBC Auto Differential[[id number]]        Abnormal            Final result                 Please view results for these tests on the individual orders.    CBC Auto Differential [[id number]]  (Abnormal) Collected: 04/26/23 0632    Specimen: Blood Updated: 04/26/23 0710     WBC 8.39 10*3/mm3       RBC 3.43 10*6/mm3      Hemoglobin 10.1 g/dL      Hematocrit 30.5 %      MCV 88.9 fL      MCH 29.4 pg      MCHC 33.1 g/dL      RDW 15.9 %      RDW-SD 51.5 fl      MPV 10.3 fL      Platelets 411 10*3/mm3      Neutrophil % 57.3 %      Lymphocyte % 31.2 %      Monocyte % 8.0 %      Eosinophil % 1.9 %      Basophil % 1.1 %      Immature Grans % 0.5 %      Neutrophils, Absolute 4.81 10*3/mm3      Lymphocytes, Absolute 2.62 10*3/mm3      Monocytes, Absolute 0.67 10*3/mm3      Eosinophils, Absolute 0.16 10*3/mm3      Basophils, Absolute 0.09 10*3/mm3      Immature Grans, Absolute 0.04 10*3/mm3      nRBC 0.0 /100 WBC     POC Glucose Once [[id number]]  (Abnormal) Collected: 04/26/23 0617    Specimen: Blood Updated: 04/26/23 0650     Glucose 259 mg/dL      Comment: RN NotifiedOperator: [id number] ARAGON FAITHMeter ID: [id number]       POC Glucose Once [[id number]]  (Abnormal) Collected: 04/25/23 1000    Specimen: Blood Updated: 04/26/23 0527     Glucose 251 mg/dL      Comment: RN NotifiedOperator: [id number] CRISTINA Che ID: [id number]           Imaging Results (Last 24 Hours)     ** No results found for the last 24 hours. **          Chief Complaint on Day of Discharge: No complaints    Hospital Course:  This is a 62-year-old male with past medical history of COPD, DM 2, GERD, gout, HLD, sleep apnea and stroke that presented to [facility] on 4/23/2023 with complaints of chest pain and shortness of air.     Patient was recently discharged on 4/18/2023 for a pulmonary embolus and GI bleeding issues.  CTA of the chest on this admission shows resolution of pulmonary embolus.    CTA of the coronary arteries indicated no significant disease process.  Patient was recommended to continue statin and aspirin.  Follow-up with cardiology in 3 to 4 months.  Follow-up PCP in 1 week.    Condition on Discharge: Stable    Physical Exam on Discharge:  /59 (BP Location: Right arm, Patient Position: Lying)   Pulse  "86   Temp 97.8 °F (36.6 °C) (Temporal)   Resp 16   Ht 188 cm (74.02\")   Wt 77.9 kg (171 lb 12.8 oz)   SpO2 99%   BMI 22.05 kg/m²   Physical Exam  Constitutional:       Appearance: He is well-developed.   HENT:      Head: Normocephalic and atraumatic.   Eyes:      Pupils: Pupils are equal, round, and reactive to light.   Cardiovascular:      Rate and Rhythm: Normal rate and regular rhythm.   Pulmonary:      Effort: Pulmonary effort is normal.      Breath sounds: Normal breath sounds.   Abdominal:      General: Bowel sounds are normal.      Palpations: Abdomen is soft.   Musculoskeletal:         General: Normal range of motion.      Cervical back: Normal range of motion and neck supple.   Skin:     General: Skin is warm and dry.   Neurological:      Mental Status: He is alert and oriented to person, place, and time.   Psychiatric:         Behavior: Behavior normal.       Discharge Disposition:  Skilled Nursing Facility (DC - External)    Discharge Medications:     Discharge Medications      New Medications      Instructions Start Date   isosorbide mononitrate 30 MG 24 hr tablet  Commonly known as: IMDUR   30 mg, Oral, Every 24 Hours Scheduled      pantoprazole 40 MG EC tablet  Commonly known as: PROTONIX  Replaces: omeprazole 40 MG capsule   40 mg, Oral, Every Early Morning   Start Date: April 27, 2023        Changes to Medications      Instructions Start Date   BASAGLAR KWIKPEN 100 UNIT/ML injection pen  What changed:   · how much to take  · when to take this   41 Units, Subcutaneous, Daily         Continue These Medications      Instructions Start Date   aspirin 81 MG chewable tablet   81 mg, Oral, Daily      atorvastatin 20 MG tablet  Commonly known as: LIPITOR   40 mg, Oral, Nightly      DULoxetine 30 MG capsule  Commonly known as: CYMBALTA   30 mg, Oral, Nightly      Farxiga 5 MG tablet tablet  Generic drug: dapagliflozin   10 mg, Oral, Daily      FiberCon 625 MG tablet  Generic drug: calcium " polycarbophil   2 tablets, Oral, Nightly      fluticasone 50 MCG/ACT nasal spray  Commonly known as: FLONASE   1 spray, Nasal, Daily      FreeStyle Fina 2 Sensor misc   REPLACE SENSOR E 14 DAYS      glucose blood test strip   USE TO TEST BLOOD SUGAR 3 TIMES A DAY      linaclotide 145 MCG capsule capsule  Commonly known as: LINZESS   145 mcg, Oral, Every Morning Before Breakfast      magnesium oxide 400 MG tablet  Commonly known as: MAG-OX   400 mg, Oral, Daily      Pancrelipase (Lip-Prot-Amyl) 15268-355820 units capsule delayed-release particles capsule  Commonly known as: CREON   108,000 units of lipase, Oral, 3 Times Daily      tamsulosin 0.4 MG capsule 24 hr capsule  Commonly known as: FLOMAX   1 capsule, Oral, Daily      vitamin D 1.25 MG (39282 UT) capsule capsule  Commonly known as: ERGOCALCIFEROL   50,000 Units, Oral, Weekly         Stop These Medications    acetaminophen 325 MG tablet  Commonly known as: TYLENOL     omeprazole 40 MG capsule  Commonly known as: priLOSEC  Replaced by: pantoprazole 40 MG EC tablet            Discharge Diet:   Diet Instructions     Diet: Cardiac Diets; Healthy Heart (2-3 Na+); Regular Texture (IDDSI 7); Thin (IDDSI 0)      Discharge Diet: Cardiac Diets    Cardiac Diet: Healthy Heart (2-3 Na+)    Texture: Regular Texture (IDDSI 7)    Fluid Consistency: Thin (IDDSI 0)          Activity at Discharge:   Activity Instructions     Activity as Tolerated            Discharge Care Plan/Instructions: As above    Follow-up Appointment:   Follow-up Information     Guanako Atkins MD .    Specialty: Family Medicine  Why: PATIENT DISCHARGED TO SKILLED NURSING FACILITY  Contact information:  [address] [place] [address]  [phone]             Viola Watkins MD Follow up in 1 month(s).    Specialty: Cardiology  Contact information:  [address] [place] [address] [address]  [facility] [address]  [place] [address]  [phone]                             This document has been electronically  signed by NIKIA Garza on April 26, 2023 10:19 CDT        Time: Greater than 30 minutes.

## 2023-04-26 NOTE — PLAN OF CARE
Problem: Adult Inpatient Plan of Care  Goal: Plan of Care Review  Outcome: Ongoing, Progressing  Goal: Patient-Specific Goal (Individualized)  Outcome: Ongoing, Progressing  Goal: Absence of Hospital-Acquired Illness or Injury  Outcome: Ongoing, Progressing  Intervention: Identify and Manage Fall Risk  Intervention: Prevent Skin Injury  Intervention: Prevent and Manage VTE (Venous Thromboembolism) Risk  Goal: Optimal Comfort and Wellbeing  Outcome: Ongoing, Progressing  Goal: Readiness for Transition of Care  Outcome: Ongoing, Progressing     Problem: Fall Injury Risk  Goal: Absence of Fall and Fall-Related Injury  Outcome: Ongoing, Progressing  Intervention: Promote Injury-Free Environment     Problem: Chest Pain  Goal: Resolution of Chest Pain Symptoms  Outcome: Ongoing, Progressing     Problem: Skin Injury Risk Increased  Goal: Skin Health and Integrity  Outcome: Ongoing, Progressing  Intervention: Optimize Skin Protection   Goal Outcome Evaluation:      Pt slept a little over night, anxious about going to [place]. Pt has called for assistance with his urinal. VSS. Plan of  care continues.

## 2023-04-28 ENCOUNTER — LAB REQUISITION (OUTPATIENT)
Dept: LAB | Facility: HOSPITAL | Age: 63
End: 2023-04-28
Payer: MEDICARE

## 2023-04-28 DIAGNOSIS — N39.0 URINARY TRACT INFECTION, SITE NOT SPECIFIED: ICD-10-CM

## 2023-04-28 LAB
BILIRUB UR QL STRIP: NEGATIVE
CLARITY UR: CLEAR
COLOR UR: YELLOW
GLUCOSE UR STRIP-MCNC: ABNORMAL MG/DL
HGB UR QL STRIP.AUTO: NEGATIVE
KETONES UR QL STRIP: ABNORMAL
LEUKOCYTE ESTERASE UR QL STRIP.AUTO: NEGATIVE
NITRITE UR QL STRIP: NEGATIVE
PH UR STRIP.AUTO: <=5 [PH] (ref 5–9)
PROT UR QL STRIP: NEGATIVE
SP GR UR STRIP: 1.03 (ref 1–1.03)
UROBILINOGEN UR QL STRIP: ABNORMAL

## 2023-04-28 PROCEDURE — 87086 URINE CULTURE/COLONY COUNT: CPT | Performed by: FAMILY MEDICINE

## 2023-04-28 PROCEDURE — 81003 URINALYSIS AUTO W/O SCOPE: CPT | Performed by: FAMILY MEDICINE

## 2023-04-29 LAB — BACTERIA SPEC AEROBE CULT: NO GROWTH

## 2023-05-05 LAB
QT INTERVAL: 392 MS
QTC INTERVAL: 443 MS

## 2023-05-11 ENCOUNTER — LAB REQUISITION (OUTPATIENT)
Dept: LAB | Facility: HOSPITAL | Age: 63
End: 2023-05-11
Payer: MEDICARE

## 2023-05-11 DIAGNOSIS — N39.0 URINARY TRACT INFECTION, SITE NOT SPECIFIED: ICD-10-CM

## 2023-05-11 LAB
BILIRUB UR QL STRIP: NEGATIVE
CLARITY UR: CLEAR
COLOR UR: YELLOW
GLUCOSE UR STRIP-MCNC: ABNORMAL MG/DL
HGB UR QL STRIP.AUTO: NEGATIVE
KETONES UR QL STRIP: ABNORMAL
LEUKOCYTE ESTERASE UR QL STRIP.AUTO: NEGATIVE
NITRITE UR QL STRIP: NEGATIVE
PH UR STRIP.AUTO: <=5 [PH] (ref 5–9)
PROT UR QL STRIP: NEGATIVE
SP GR UR STRIP: 1.04 (ref 1–1.03)
UROBILINOGEN UR QL STRIP: ABNORMAL

## 2023-05-11 PROCEDURE — 81003 URINALYSIS AUTO W/O SCOPE: CPT | Performed by: FAMILY MEDICINE

## 2023-05-11 PROCEDURE — 87086 URINE CULTURE/COLONY COUNT: CPT | Performed by: FAMILY MEDICINE

## 2023-05-12 LAB — BACTERIA SPEC AEROBE CULT: NORMAL

## 2023-05-15 ENCOUNTER — APPOINTMENT (OUTPATIENT)
Dept: GENERAL RADIOLOGY | Facility: HOSPITAL | Age: 63
End: 2023-05-15
Payer: MEDICARE

## 2023-05-15 ENCOUNTER — HOSPITAL ENCOUNTER (EMERGENCY)
Facility: HOSPITAL | Age: 63
Discharge: HOME | End: 2023-05-15
Attending: EMERGENCY MEDICINE | Admitting: EMERGENCY MEDICINE
Payer: MEDICARE

## 2023-05-15 ENCOUNTER — APPOINTMENT (OUTPATIENT)
Dept: CT IMAGING | Facility: HOSPITAL | Age: 63
End: 2023-05-15
Payer: MEDICARE

## 2023-05-15 VITALS
BODY MASS INDEX: 21.94 KG/M2 | TEMPERATURE: 97.6 F | HEIGHT: 74 IN | HEART RATE: 78 BPM | WEIGHT: 171 LBS | DIASTOLIC BLOOD PRESSURE: 58 MMHG | SYSTOLIC BLOOD PRESSURE: 104 MMHG | RESPIRATION RATE: 20 BRPM | OXYGEN SATURATION: 96 %

## 2023-05-15 DIAGNOSIS — R07.9 CHEST PAIN, UNSPECIFIED TYPE: Primary | ICD-10-CM

## 2023-05-15 LAB
ALBUMIN SERPL-MCNC: 3.6 G/DL (ref 3.5–5.2)
ALBUMIN/GLOB SERPL: 1.4 G/DL
ALP SERPL-CCNC: 87 U/L (ref 39–117)
ALT SERPL W P-5'-P-CCNC: 9 U/L (ref 1–41)
ANION GAP SERPL CALCULATED.3IONS-SCNC: 10 MMOL/L (ref 5–15)
AST SERPL-CCNC: 12 U/L (ref 1–40)
BASOPHILS # BLD AUTO: 0.08 10*3/MM3 (ref 0–0.2)
BASOPHILS NFR BLD AUTO: 0.9 % (ref 0–1.5)
BILIRUB SERPL-MCNC: <0.2 MG/DL (ref 0–1.2)
BUN SERPL-MCNC: 13 MG/DL (ref 8–23)
BUN/CREAT SERPL: 20.6 (ref 7–25)
CALCIUM SPEC-SCNC: 8.9 MG/DL (ref 8.6–10.5)
CHLORIDE SERPL-SCNC: 104 MMOL/L (ref 98–107)
CO2 SERPL-SCNC: 25 MMOL/L (ref 22–29)
CREAT SERPL-MCNC: 0.63 MG/DL (ref 0.76–1.27)
DEPRECATED RDW RBC AUTO: 45.8 FL (ref 37–54)
EGFRCR SERPLBLD CKD-EPI 2021: 106.2 ML/MIN/1.73
EOSINOPHIL # BLD AUTO: 0.19 10*3/MM3 (ref 0–0.4)
EOSINOPHIL NFR BLD AUTO: 2.1 % (ref 0.3–6.2)
ERYTHROCYTE [DISTWIDTH] IN BLOOD BY AUTOMATED COUNT: 14 % (ref 12.3–15.4)
GEN 5 2HR TROPONIN T REFLEX: 10 NG/L
GLOBULIN UR ELPH-MCNC: 2.5 GM/DL
GLUCOSE SERPL-MCNC: 106 MG/DL (ref 65–99)
HCT VFR BLD AUTO: 33.3 % (ref 37.5–51)
HGB BLD-MCNC: 10.8 G/DL (ref 13–17.7)
HOLD SPECIMEN: NORMAL
HOLD SPECIMEN: NORMAL
IMM GRANULOCYTES # BLD AUTO: 0.04 10*3/MM3 (ref 0–0.05)
IMM GRANULOCYTES NFR BLD AUTO: 0.4 % (ref 0–0.5)
LYMPHOCYTES # BLD AUTO: 3.46 10*3/MM3 (ref 0.7–3.1)
LYMPHOCYTES NFR BLD AUTO: 37.4 % (ref 19.6–45.3)
MCH RBC QN AUTO: 29.1 PG (ref 26.6–33)
MCHC RBC AUTO-ENTMCNC: 32.4 G/DL (ref 31.5–35.7)
MCV RBC AUTO: 89.8 FL (ref 79–97)
MONOCYTES # BLD AUTO: 0.64 10*3/MM3 (ref 0.1–0.9)
MONOCYTES NFR BLD AUTO: 6.9 % (ref 5–12)
NEUTROPHILS NFR BLD AUTO: 4.83 10*3/MM3 (ref 1.7–7)
NEUTROPHILS NFR BLD AUTO: 52.3 % (ref 42.7–76)
NRBC BLD AUTO-RTO: 0 /100 WBC (ref 0–0.2)
NT-PROBNP SERPL-MCNC: <36 PG/ML (ref 0–900)
PLATELET # BLD AUTO: 284 10*3/MM3 (ref 140–450)
PMV BLD AUTO: 10 FL (ref 6–12)
POTASSIUM SERPL-SCNC: 3.8 MMOL/L (ref 3.5–5.2)
PROT SERPL-MCNC: 6.1 G/DL (ref 6–8.5)
RBC # BLD AUTO: 3.71 10*6/MM3 (ref 4.14–5.8)
SODIUM SERPL-SCNC: 139 MMOL/L (ref 136–145)
TROPONIN T DELTA: -1 NG/L
TROPONIN T SERPL HS-MCNC: 11 NG/L
WBC NRBC COR # BLD: 9.24 10*3/MM3 (ref 3.4–10.8)
WHOLE BLOOD HOLD COAG: NORMAL
WHOLE BLOOD HOLD SPECIMEN: NORMAL

## 2023-05-15 PROCEDURE — 93005 ELECTROCARDIOGRAM TRACING: CPT | Performed by: EMERGENCY MEDICINE

## 2023-05-15 PROCEDURE — 71275 CT ANGIOGRAPHY CHEST: CPT

## 2023-05-15 PROCEDURE — 93005 ELECTROCARDIOGRAM TRACING: CPT

## 2023-05-15 PROCEDURE — 99284 EMERGENCY DEPT VISIT MOD MDM: CPT

## 2023-05-15 PROCEDURE — 84484 ASSAY OF TROPONIN QUANT: CPT | Performed by: EMERGENCY MEDICINE

## 2023-05-15 PROCEDURE — 84484 ASSAY OF TROPONIN QUANT: CPT

## 2023-05-15 PROCEDURE — 25510000001 IOPAMIDOL 61 % SOLUTION: Performed by: EMERGENCY MEDICINE

## 2023-05-15 PROCEDURE — 71045 X-RAY EXAM CHEST 1 VIEW: CPT

## 2023-05-15 PROCEDURE — 83880 ASSAY OF NATRIURETIC PEPTIDE: CPT

## 2023-05-15 PROCEDURE — 80053 COMPREHEN METABOLIC PANEL: CPT

## 2023-05-15 PROCEDURE — 85025 COMPLETE CBC W/AUTO DIFF WBC: CPT

## 2023-05-15 PROCEDURE — 36415 COLL VENOUS BLD VENIPUNCTURE: CPT

## 2023-05-15 RX ORDER — SODIUM CHLORIDE 0.9 % (FLUSH) 0.9 %
10 SYRINGE (ML) INJECTION AS NEEDED
Status: DISCONTINUED | OUTPATIENT
Start: 2023-05-15 | End: 2023-05-16 | Stop reason: HOSPADM

## 2023-05-15 RX ADMIN — IOPAMIDOL 89 ML: 612 INJECTION, SOLUTION INTRAVENOUS at 19:44

## 2023-05-16 NOTE — ED NOTES
Nursing report called to [facility] at [facility]. Per pt request, TC to daughter, Courtney Vann ([phone]) to request transportation back to [place]. Daughter states she will be here in approximately 30 minutes for transport. Pt updated.

## 2023-05-16 NOTE — ED PROVIDER NOTES
Subjective   History of Present Illness  62-year-old male was brought to the emergency department from nursing home with concern for left-sided chest pain and shoulder pain that radiates into his left arm.  Reports he had similar pain a few weeks ago when he was told he had a blood clot.  He reports he is not currently on anticoagulant.  Review of documentation shows he had a revisit for chest pain and was found to have no PEs at that time and apparently has had some GI bleeding and is not on anticoagulant.  History of COPD, diabetes, GERD and hyperlipidemia.  He also had an angiogram of his coronaries which revealed no disease during his last hospitalization.    Family history, surgical history, social history, current medications and allergies are reviewed with the patient and triage documentation and vitals are reviewed.    History provided by:  Patient, medical records, EMS personnel and nursing home   used: No        Review of Systems   Constitutional: Negative for chills, fatigue and fever.   HENT: Negative for congestion and sore throat.    Eyes: Negative for photophobia and visual disturbance.   Respiratory: Positive for shortness of breath. Negative for cough and wheezing.    Cardiovascular: Positive for chest pain. Negative for palpitations and leg swelling.   Gastrointestinal: Negative for abdominal pain, diarrhea, nausea and vomiting.   Endocrine: Negative for polydipsia, polyphagia and polyuria.   Genitourinary: Negative.    Musculoskeletal: Negative for arthralgias, back pain, myalgias and neck pain.   Skin: Negative for rash and wound.   Allergic/Immunologic: Negative.    Neurological: Negative for weakness, light-headedness and numbness.   Hematological: Negative.    Psychiatric/Behavioral: Negative.        Past Medical History:   Diagnosis Date   • COPD (chronic obstructive pulmonary disease)    • Diabetes mellitus    • GERD (gastroesophageal reflux disease)    • Gout    •  History of transfusion    • Hyperlipemia    • Sleep apnea    • Stroke        No Known Allergies    Past Surgical History:   Procedure Laterality Date   • BACK SURGERY     • CHOLECYSTECTOMY     • COLONOSCOPY N/A 3/14/2017    Procedure: COLONOSCOPY;  Surgeon: Cachorro Perez DO;  Location: [facility] ENDOSCOPY;  Service:    • HAND SURGERY     • LEG SURGERY Right    • NOSE SURGERY     • PANCREAS SURGERY     • SPLENECTOMY         History reviewed. No pertinent family history.    Social History     Socioeconomic History   • Marital status: Single   Tobacco Use   • Smoking status: Former     Packs/day: 1.00     Types: Cigarettes     Quit date: 3/1/2023     Years since quittin.2   Vaping Use   • Vaping Use: Never used   Substance and Sexual Activity   • Alcohol use: Not Currently   • Drug use: No   • Sexual activity: Defer           Objective   Physical Exam  Vitals and nursing note reviewed.   Constitutional:       General: He is not in acute distress.     Appearance: He is well-developed and normal weight. He is not ill-appearing, toxic-appearing or diaphoretic.   HENT:      Head: Normocephalic.   Eyes:      Pupils: Pupils are equal, round, and reactive to light.   Neck:      Vascular: No JVD.   Cardiovascular:      Rate and Rhythm: Normal rate and regular rhythm.  No extrasystoles are present.     Pulses:           Radial pulses are 2+ on the right side and 2+ on the left side.        Dorsalis pedis pulses are 2+ on the right side and 2+ on the left side.      Heart sounds: Normal heart sounds. No murmur heard.  Pulmonary:      Effort: Pulmonary effort is normal. No tachypnea, accessory muscle usage or respiratory distress.      Breath sounds: No decreased breath sounds, wheezing, rhonchi or rales.   Chest:      Chest wall: Tenderness present. No crepitus.   Abdominal:      General: Bowel sounds are normal.      Palpations: Abdomen is soft. There is no hepatomegaly or splenomegaly.      Tenderness: There is no  abdominal tenderness. There is no guarding or rebound.   Musculoskeletal:         General: Normal range of motion.      Cervical back: Neck supple.      Right lower leg: No tenderness. No edema.      Left lower leg: No tenderness. No edema.   Skin:     General: Skin is warm and dry.      Capillary Refill: Capillary refill takes less than 2 seconds.   Neurological:      General: No focal deficit present.      Mental Status: He is alert and oriented to person, place, and time.   Psychiatric:         Mood and Affect: Mood normal.         Behavior: Behavior normal.         ECG 12 Lead      Date/Time: 5/15/2023 6:20 PM  Performed by: Franki Khalil DO  Authorized by: Franki Khalil DO   Interpreted by physician  Comments: EKG May 15, 2023 at 1820 reveals normal sinus rhythm rate of 73 bpm.  Incomplete right bundle branch block.  T wave flattening in lead III and V2 without inversion.  No ST elevation or depression.  No evidence of acute ischemia.  Unchanged from previous.  QTc 425.             HEART Score for Major Cardiac Events - MDCalc  Calculated on May 15 2023 10:41 PM  2 points -> Low Score (0-3 points) Risk of MACE of 0.9-1.7%.    ED Course      Labs Reviewed   COMPREHENSIVE METABOLIC PANEL - Abnormal; Notable for the following components:       Result Value    Glucose 106 (*)     Creatinine 0.63 (*)     All other components within normal limits    Narrative:     GFR Normal >60  Chronic Kidney Disease <60  Kidney Failure <15     CBC WITH AUTO DIFFERENTIAL - Abnormal; Notable for the following components:    RBC 3.71 (*)     Hemoglobin 10.8 (*)     Hematocrit 33.3 (*)     Lymphocytes, Absolute 3.46 (*)     All other components within normal limits   TROPONIN - Normal    Narrative:     High Sensitive Troponin T Reference Range:  <10.0 ng/L- Negative Female for AMI  <15.0 ng/L- Negative Male for AMI  >=10 - Abnormal Female indicating possible myocardial injury.  >=15 - Abnormal Male indicating possible  myocardial injury.   Clinicians would have to utilize clinical acumen, EKG, Troponin, and serial changes to determine if it is an Acute Myocardial Infarction or myocardial injury due to an underlying chronic condition.        BNP (IN-HOUSE) - Normal    Narrative:     Among patients with dyspnea, NT-proBNP is highly sensitive for the detection of acute congestive heart failure. In addition NT-proBNP of <300 pg/ml effectively rules out acute congestive heart failure with 99% negative predictive value.     HIGH SENSITIVITIY TROPONIN T 2HR - Normal    Narrative:     High Sensitive Troponin T Reference Range:  <10.0 ng/L- Negative Female for AMI  <15.0 ng/L- Negative Male for AMI  >=10 - Abnormal Female indicating possible myocardial injury.  >=15 - Abnormal Male indicating possible myocardial injury.   Clinicians would have to utilize clinical acumen, EKG, Troponin, and serial changes to determine if it is an Acute Myocardial Infarction or myocardial injury due to an underlying chronic condition.        RAINBOW DRAW    Narrative:     The following orders were created for panel order [place] Draw.  Procedure                               Abnormality         Status                     ---------                               -----------         ------                     Green Top (Gel)[[id number]]                                  Final result               Lavender Top[[id number]]                                     Final result               Gold Top - SST[[id number]]                                   Final result               Light Blue Top[[id number]]                                   Final result                 Please view results for these tests on the individual orders.   CBC AND DIFFERENTIAL    Narrative:     The following orders were created for panel order CBC & Differential.  Procedure                               Abnormality         Status                     ---------                               -----------          ------                     CBC Auto Differential[[id number]]        Abnormal            Final result                 Please view results for these tests on the individual orders.   GREEN TOP   LAVENDER TOP   GOLD TOP - SST   LIGHT BLUE TOP     XR Chest 1 View    Result Date: 5/15/2023  Narrative: HISTORY: Chest pain. FINDINGS: No infiltrate, effusion or pneumothorax is seen.  Heart size and pulmonary vascularity are normal.  The lungs are clear.  The mediastinum, flako and visualized osseous structures are unremarkable.     Impression: No significant abnormality of the chest.     XR Chest 1 View    Result Date: 4/17/2023  Narrative: FINDINGS: The heart size and pulmonary vascularity are normal.  The lungs are clear.  The mediastinum, flako and visualized osseous structures are unremarkable.    Impression: IMPRESSION: No significant abnormality of the chest.    CT Angiogram Coronary    Result Date: 4/24/2023  Narrative: HISTORY: Chest pain. COMPARISON: 4/23/2023 TECHNIQUE: Axial images through the heart were completed without contrast.  Then, IV contrast was administered and axial images through the heart were completed.  3D multiplanar and gated reformats of the coronary arteries were completed on a separate work station under concurrent supervision. FINDINGS: Incidental findings: There are no suspicious incidental findings. Calcium score: Calcium score is 734. Function: Ejection fraction is estimated at 62%.  Wall motion is normal. Left main: The left main coronary artery arises from the left coronary cusp. There is no plaque or stenosis. LAD: Left anterior descending coronary artery arises from the left main coronary artery.  There is a moderate degree of eccentric calcified plaque within the proximal midportion of the vessel without high-grade stenosis. LCx: Left circumflex coronary artery arises from the left main coronary artery. The vessel is diminutive.  There is no plaque or high-grade stenosis. RCA: The  right coronary artery is partially obscured by motion and streak artifact.  There is eccentric calcified plaque, but no high-grade stenosis is evident.  The right coronary artery supplies the PDA.     Impression: 1.  Eccentric calcified plaque within the proximal and mid LAD and RCA without definite high-grade stenoses.  Portions of the RCA are obscured by motion and streak artifact. 2.  Calcium score 734. 3.  Ejection fraction estimated at 62%.    US Venous Doppler Lower Extremity Bilateral (duplex)    Result Date: 4/24/2023  Narrative: INDICATION: Rule out DVT and patient previously diagnosed with PE COMPARISON: None TECHNIQUE: Ultrasound examination of the bilateral lower extremity veins using high-resolution grayscale images with and without graded compression and color and spectral Doppler images. FINDINGS: Right: No evidence of thrombus.  Fluid collection in the popliteal fossa, 4.9 x 2.1 x 3.1 cm, probably represents a popliteal cyst. Left: No evidence of thrombus.     Impression: No evidence of deep vein thrombosis in the bilateral lower extremities.     CT Angiogram Chest    Result Date: 5/15/2023  Narrative: HISTORY: Left-sided chest pain, recent PE. COMPARISON: 4/23/2023 TECHNIQUE: IV contrast was administered and axial images from the thoracic inlet through the diaphragms were performed.  3D multiplanar reformats of the thoracic aorta were completed on a separate workstation under concurrent supervision. FINDINGS: There is some mild scarring or atelectasis within the left lung base, which is stable.  There is no pleural or pericardial effusion.  There is no pulmonary embolism.  Upper abdomen is unremarkable.     Impression: No pulmonary embolism.  No acute abnormality in the chest.    CT Angiogram Chest    Result Date: 4/24/2023  Narrative: INDICATION: Chest pain/PE COMPARISON: 4/3/2023 TECHNIQUE: Volumetric CT scan of the chest, from the thoracic inlet to the level of the diaphragms, with intravenous  contrast. 2D axial, sagittal, and coronal reformats were performed. MIPS were performed on a separate workstation and reviewed. FINDINGS: Lungs: Linear opacities in the bilateral lower lobes are unchanged and likely represent scarring. Pleura: No effusion or pneumothorax. Heart: Normal in size. Vessels: Thoracic aorta is normal in caliber.  No pulmonary embolism is identified. Lymph nodes: None enlarged by CT size criteria. Bones: No suspicious lesions. Visualized upper abdomen: Unremarkable. IMPRESSIONS: 1.  No CT evidence of acute thoracic pathology. 2.  Previous pulmonary emboli have resolved.     CTA CHEST FOR PULMONARY EMBOLISM W CONTRAST    Result Date: 4/17/2023  Narrative: CTA CHEST FOR PULMONARY EMBOLISM W CONTRAST HISTORY: Chest pain COMPARISON: 9/26/2018. Automated exposure control was also utilized to decrease patient radiation dose. TECHNIQUE: Helical tomographic images of the chest were obtained after the administration of intravenous contrast following angiogram protocol. Additionally, 3D and multiplanar reformatted images were provided. FINDINGS: Pulmonary arteries: There are no filling defects within the main or lobar pulmonary arteries. The pulmonary arteries are within normal limits for size. Aorta and great vessels: The aorta is well opacified and demonstrates no dissection or aneurysm. The brachiocephalic vessels are normal in appearance.. No coronary artery calcifications are visualized within limits of this study. Visualized neck base: The imaged portion of the base of the neck appears unremarkable. Lungs: Subsegmental atelectasis noted in bilateral lower lobes. There is no mass, worrisome nodule, or consolidation. No pleural effusion is seen. The trachea and bronchial tree are patent. Heart: The heart is normal in size. There is no pericardial effusion. Mediastinum and lymph nodes: No enlarged mediastinal, hilar, or axillary lymph nodes are present. Skeletal and soft tissues: The osseous  structures of the thorax and surrounding soft tissues demonstrate no acute process. Upper abdomen: The imaged portion of the upper abdomen demonstrates no acute process.    Impression: IMPRESSION: 1.  No central pulmonary embolism. 2.  No thoracic aortic aneurysm/dissection. 3.  No acute abnormality identified in the thorax.      Medical Decision Making  Amount and/or Complexity of Data Reviewed  Labs: ordered. Decision-making details documented in ED Course.  Radiology: ordered. Decision-making details documented in ED Course.  ECG/medicine tests: ordered and independent interpretation performed. Decision-making details documented in ED Course.      Risk  Prescription drug management.  Decision regarding hospitalization.      Cardiac enzymes negative x2.  EKG unremarkable.  CTA is performed and reveals no evidence of recurrent pulmonary embolism.  There is no aortic dissection or aneurysm.  Patient has been evaluated recently with a negative coronary CT as well.  Patient will be discharged back to nursing home to follow-up outpatient.    Final diagnoses:   Chest pain, unspecified type         ED Disposition  ED Disposition     ED Disposition   Discharge    Condition   Stable    Comment   --             Guanako Atkins MD  [address] [place] [address]  [phone]               Medication List      No changes were made to your prescriptions during this visit.          Franki Khalil, DO  05/15/23 2142

## 2023-05-18 LAB
QT INTERVAL: 386 MS
QTC INTERVAL: 425 MS

## 2023-05-31 ENCOUNTER — HOSPITAL ENCOUNTER (EMERGENCY)
Facility: HOSPITAL | Age: 63
Discharge: HOME | End: 2023-05-31
Attending: EMERGENCY MEDICINE | Admitting: EMERGENCY MEDICINE
Payer: MEDICARE

## 2023-05-31 ENCOUNTER — APPOINTMENT (OUTPATIENT)
Dept: GENERAL RADIOLOGY | Facility: HOSPITAL | Age: 63
End: 2023-05-31
Payer: MEDICARE

## 2023-05-31 VITALS
RESPIRATION RATE: 16 BRPM | WEIGHT: 170 LBS | HEART RATE: 90 BPM | HEIGHT: 74 IN | DIASTOLIC BLOOD PRESSURE: 68 MMHG | TEMPERATURE: 98.1 F | OXYGEN SATURATION: 98 % | SYSTOLIC BLOOD PRESSURE: 118 MMHG | BODY MASS INDEX: 21.82 KG/M2

## 2023-05-31 DIAGNOSIS — I95.9 HYPOTENSION, UNSPECIFIED HYPOTENSION TYPE: ICD-10-CM

## 2023-05-31 DIAGNOSIS — E86.0 DEHYDRATION: Primary | ICD-10-CM

## 2023-05-31 LAB
ABO GROUP BLD: NORMAL
ALBUMIN SERPL-MCNC: 3.9 G/DL (ref 3.5–5.2)
ALBUMIN/GLOB SERPL: 1.4 G/DL
ALP SERPL-CCNC: 89 U/L (ref 39–117)
ALT SERPL W P-5'-P-CCNC: 17 U/L (ref 1–41)
ANION GAP SERPL CALCULATED.3IONS-SCNC: 11 MMOL/L (ref 5–15)
AST SERPL-CCNC: 17 U/L (ref 1–40)
BASOPHILS # BLD AUTO: 0.06 10*3/MM3 (ref 0–0.2)
BASOPHILS NFR BLD AUTO: 0.6 % (ref 0–1.5)
BILIRUB SERPL-MCNC: <0.2 MG/DL (ref 0–1.2)
BILIRUB UR QL STRIP: NEGATIVE
BLD GP AB SCN SERPL QL: NEGATIVE
BUN SERPL-MCNC: 25 MG/DL (ref 8–23)
BUN/CREAT SERPL: 32.1 (ref 7–25)
CALCIUM SPEC-SCNC: 9.2 MG/DL (ref 8.6–10.5)
CHLORIDE SERPL-SCNC: 102 MMOL/L (ref 98–107)
CLARITY UR: CLEAR
CO2 SERPL-SCNC: 25 MMOL/L (ref 22–29)
COLOR UR: YELLOW
CREAT SERPL-MCNC: 0.78 MG/DL (ref 0.76–1.27)
D-LACTATE SERPL-SCNC: 1.7 MMOL/L (ref 0.5–2)
DEPRECATED RDW RBC AUTO: 47 FL (ref 37–54)
EGFRCR SERPLBLD CKD-EPI 2021: 99.6 ML/MIN/1.73
EOSINOPHIL # BLD AUTO: 0.1 10*3/MM3 (ref 0–0.4)
EOSINOPHIL NFR BLD AUTO: 1.1 % (ref 0.3–6.2)
ERYTHROCYTE [DISTWIDTH] IN BLOOD BY AUTOMATED COUNT: 14.2 % (ref 12.3–15.4)
GLOBULIN UR ELPH-MCNC: 2.7 GM/DL
GLUCOSE SERPL-MCNC: 197 MG/DL (ref 65–99)
GLUCOSE UR STRIP-MCNC: ABNORMAL MG/DL
HCT VFR BLD AUTO: 38.3 % (ref 37.5–51)
HGB BLD-MCNC: 12.1 G/DL (ref 13–17.7)
HGB UR QL STRIP.AUTO: NEGATIVE
HOLD SPECIMEN: NORMAL
IMM GRANULOCYTES # BLD AUTO: 0.02 10*3/MM3 (ref 0–0.05)
IMM GRANULOCYTES NFR BLD AUTO: 0.2 % (ref 0–0.5)
KETONES UR QL STRIP: NEGATIVE
LEUKOCYTE ESTERASE UR QL STRIP.AUTO: NEGATIVE
LYMPHOCYTES # BLD AUTO: 3.19 10*3/MM3 (ref 0.7–3.1)
LYMPHOCYTES NFR BLD AUTO: 33.7 % (ref 19.6–45.3)
Lab: NORMAL
MCH RBC QN AUTO: 28.4 PG (ref 26.6–33)
MCHC RBC AUTO-ENTMCNC: 31.6 G/DL (ref 31.5–35.7)
MCV RBC AUTO: 89.9 FL (ref 79–97)
MONOCYTES # BLD AUTO: 0.62 10*3/MM3 (ref 0.1–0.9)
MONOCYTES NFR BLD AUTO: 6.6 % (ref 5–12)
NEUTROPHILS NFR BLD AUTO: 5.47 10*3/MM3 (ref 1.7–7)
NEUTROPHILS NFR BLD AUTO: 57.8 % (ref 42.7–76)
NITRITE UR QL STRIP: NEGATIVE
NRBC BLD AUTO-RTO: 0 /100 WBC (ref 0–0.2)
NT-PROBNP SERPL-MCNC: <36 PG/ML (ref 0–900)
PH UR STRIP.AUTO: <=5 [PH] (ref 5–9)
PLATELET # BLD AUTO: 253 10*3/MM3 (ref 140–450)
PMV BLD AUTO: 10.3 FL (ref 6–12)
POTASSIUM SERPL-SCNC: 4 MMOL/L (ref 3.5–5.2)
PROT SERPL-MCNC: 6.6 G/DL (ref 6–8.5)
PROT UR QL STRIP: NEGATIVE
QT INTERVAL: 352 MS
QTC INTERVAL: 444 MS
RBC # BLD AUTO: 4.26 10*6/MM3 (ref 4.14–5.8)
RH BLD: POSITIVE
SODIUM SERPL-SCNC: 138 MMOL/L (ref 136–145)
SP GR UR STRIP: 1.03 (ref 1–1.03)
T&S EXPIRATION DATE: NORMAL
T4 FREE SERPL-MCNC: 1.08 NG/DL (ref 0.93–1.7)
TROPONIN T SERPL HS-MCNC: 10 NG/L
TSH SERPL DL<=0.05 MIU/L-ACNC: 1.37 UIU/ML (ref 0.27–4.2)
UROBILINOGEN UR QL STRIP: ABNORMAL
WBC NRBC COR # BLD: 9.46 10*3/MM3 (ref 3.4–10.8)
WHOLE BLOOD HOLD COAG: NORMAL

## 2023-05-31 PROCEDURE — 86900 BLOOD TYPING SEROLOGIC ABO: CPT | Performed by: EMERGENCY MEDICINE

## 2023-05-31 PROCEDURE — 84484 ASSAY OF TROPONIN QUANT: CPT | Performed by: EMERGENCY MEDICINE

## 2023-05-31 PROCEDURE — 83880 ASSAY OF NATRIURETIC PEPTIDE: CPT | Performed by: EMERGENCY MEDICINE

## 2023-05-31 PROCEDURE — 99284 EMERGENCY DEPT VISIT MOD MDM: CPT

## 2023-05-31 PROCEDURE — 86901 BLOOD TYPING SEROLOGIC RH(D): CPT | Performed by: EMERGENCY MEDICINE

## 2023-05-31 PROCEDURE — 86850 RBC ANTIBODY SCREEN: CPT | Performed by: EMERGENCY MEDICINE

## 2023-05-31 PROCEDURE — 83605 ASSAY OF LACTIC ACID: CPT | Performed by: EMERGENCY MEDICINE

## 2023-05-31 PROCEDURE — 84443 ASSAY THYROID STIM HORMONE: CPT | Performed by: EMERGENCY MEDICINE

## 2023-05-31 PROCEDURE — 84439 ASSAY OF FREE THYROXINE: CPT | Performed by: EMERGENCY MEDICINE

## 2023-05-31 PROCEDURE — 81003 URINALYSIS AUTO W/O SCOPE: CPT | Performed by: EMERGENCY MEDICINE

## 2023-05-31 PROCEDURE — 80053 COMPREHEN METABOLIC PANEL: CPT | Performed by: EMERGENCY MEDICINE

## 2023-05-31 PROCEDURE — 93005 ELECTROCARDIOGRAM TRACING: CPT | Performed by: EMERGENCY MEDICINE

## 2023-05-31 PROCEDURE — 85025 COMPLETE CBC W/AUTO DIFF WBC: CPT | Performed by: EMERGENCY MEDICINE

## 2023-05-31 PROCEDURE — 71045 X-RAY EXAM CHEST 1 VIEW: CPT

## 2023-05-31 RX ADMIN — SODIUM CHLORIDE 1000 ML: 9 INJECTION, SOLUTION INTRAVENOUS at 15:12

## 2023-05-31 NOTE — DISCHARGE INSTRUCTIONS
Please do not take Imdur for 3 days.  Please drink more water.  Follow the instructions provided regarding the amount.

## 2023-05-31 NOTE — ED PROVIDER NOTES
Subjective   History of Present Illness  This is a 62-year-old male who presents for evaluation of low blood pressure.  The patient's significant other has been checking his blood pressure multiple times a day since he left the hospital.  Over the past couple of days his systolic blood pressure has been between 100-120 fairly consistently.  This afternoon he has had a couple blood pressures below 100.  He complains of left-sided chest ache which at this point appears to become chronic when compared to past work-ups that he has had thus far for it.  There is history of pulmonary embolism however it was resolved as of the most recent imaging.  The patient did see his cardiologist less than a week ago and their plan was for him to resume anticoagulation however family and PCP disagree with this and so the patient is still currently not anticoagulated.  He denies any GI bleeding or abnormal appearing bowel movements.  He denies any rash or itching.  He denies any vomiting or diarrhea.  He denies any shortness of breath.  He denies any new medications.  His significant other does point out that he drinks very little water on a daily basis.  He denies any recent injuries.        Review of Systems   All other systems reviewed and are negative.      Past Medical History:   Diagnosis Date   • COPD (chronic obstructive pulmonary disease)    • Diabetes mellitus    • GERD (gastroesophageal reflux disease)    • Gout    • History of transfusion    • Hyperlipemia    • Sleep apnea    • Stroke        No Known Allergies    Past Surgical History:   Procedure Laterality Date   • BACK SURGERY     • CHOLECYSTECTOMY     • COLONOSCOPY N/A 3/14/2017    Procedure: COLONOSCOPY;  Surgeon: Cachorro Perez DO;  Location: [facility] ENDOSCOPY;  Service:    • HAND SURGERY     • LEG SURGERY Right    • NOSE SURGERY     • PANCREAS SURGERY     • SPLENECTOMY         No family history on file.    Social History     Socioeconomic History   • Marital  "status: Single   Tobacco Use   • Smoking status: Former     Packs/day: 1.00     Types: Cigarettes     Quit date: 3/1/2023     Years since quittin.2   Vaping Use   • Vaping Use: Never used   Substance and Sexual Activity   • Alcohol use: Not Currently   • Drug use: No   • Sexual activity: Defer           Objective   Physical Exam  Vitals and nursing note reviewed.   Constitutional:       Comments: The patient appears chronically frail   HENT:      Head: Normocephalic and atraumatic.      Nose: Nose normal.      Mouth/Throat:      Mouth: Mucous membranes are dry.   Eyes:      General: No scleral icterus.  Cardiovascular:      Rate and Rhythm: Regular rhythm. Tachycardia present.      Pulses: Normal pulses.   Pulmonary:      Effort: Pulmonary effort is normal.      Breath sounds: Normal breath sounds.      Comments: The patient has reproducible left-sided chest tenderness around the area of the nipple with palpation.  Chest:      Chest wall: Tenderness present.   Abdominal:      General: Abdomen is flat.      Tenderness: There is no abdominal tenderness.   Musculoskeletal:         General: No signs of injury.   Skin:     General: Skin is warm and dry.   Neurological:      Mental Status: He is alert and oriented to person, place, and time.      Comments: Diminished strength of the left upper and lower extremity due to past stroke.   Psychiatric:         Behavior: Behavior normal.         Procedures           ED Course  ED Course as of 23 1718   Wed May 31, 2023   1617 CBC & Differential(!)  Mild anemia improved from baseline [JV]   1617 Comprehensive Metabolic Panel(!)  Elevated BUN to creatinine ratio suggesting prerenal [JV]      ED Course User Index  [JV] Mg Grimaldo DO                                   [facility] reviewed by Mg Grimaldo, DO       Medical Decision Making  The patient's recent past medical charts for this facility as well as outside facilities via the \"care everywhere\" application of epic " reviewed.  Earlier in the year the patient was diagnosed with a PE and started anticoagulation.  He was admitted to this facility in April with chest pain and GI bleeding.  He was found to have the PE resolved and anticoagulation was discontinued as the patient was experiencing significant GI bleeding.  The patient recently followed up with his cardiologist about 5 days ago and decision was made to reinstitute anticoagulation given his history of PE, prior stroke, and atrial fibrillation.    The differential diagnosis includes severe anemia, dehydration, anaphylaxis, and sepsis, among others.    On final reevaluation the patient is in stable condition.  Vital signs have improved.  Based on this as well as his examination and testing I suspect dehydration.  We discussed discharge instructions and need for follow-up.  We discussed reasons for early return to the emergency department.        Dehydration: acute illness or injury  Hypotension, unspecified hypotension type: acute illness or injury  Amount and/or Complexity of Data Reviewed  Independent Historian: spouse     Details: The patient's significant other provides additional history.  Labs: ordered. Decision-making details documented in ED Course.  Radiology: ordered.  ECG/medicine tests: ordered and independent interpretation performed.     Details: EKG interpretation: Interpreted by myself.  Normal sinus rhythm.  Rate 96.  Normal P wave and NC interval.  Incomplete right bundle branch block and normal axis.  QTc normal.  ST segments are normal.      Risk  OTC drugs.  Prescription drug management.          Final diagnoses:   Dehydration   Hypotension, unspecified hypotension type       ED Disposition  ED Disposition     ED Disposition   Discharge    Condition   Stable    Comment   --             Guanako Atkins MD  [address] [place] [id number] [phone]    Call today  To make an appointment to be reevaluated and to consider modifying your home  blood pressure medications after an emergency department visit with dehydration and low blood pressure    [facility] EMERGENCY DEPARTMENT  [address] [place] [id number] [phone]    As needed, If symptoms worsen at any time         Medication List      No changes were made to your prescriptions during this visit.          Mg Grimaldo DO  05/31/23 1717

## 2023-06-18 ENCOUNTER — APPOINTMENT (OUTPATIENT)
Dept: GENERAL RADIOLOGY | Facility: HOSPITAL | Age: 63
End: 2023-06-18
Payer: MEDICARE

## 2023-06-18 ENCOUNTER — HOSPITAL ENCOUNTER (EMERGENCY)
Facility: HOSPITAL | Age: 63
Discharge: HOME | End: 2023-06-19
Attending: EMERGENCY MEDICINE | Admitting: EMERGENCY MEDICINE
Payer: MEDICARE

## 2023-06-18 DIAGNOSIS — J44.1 COPD WITH ACUTE EXACERBATION: Primary | ICD-10-CM

## 2023-06-18 LAB
ARTERIAL PATENCY WRIST A: ABNORMAL
ATMOSPHERIC PRESS: 743 MMHG
BASE EXCESS BLDA CALC-SCNC: 3.9 MMOL/L (ref 0–2)
BASOPHILS # BLD AUTO: 0.12 10*3/MM3 (ref 0–0.2)
BASOPHILS NFR BLD AUTO: 1.2 % (ref 0–1.5)
BDY SITE: ABNORMAL
D-DIMER, QUANTITATIVE (MAD,POW, STR): 630 NG/ML (FEU) (ref 0–640)
D-LACTATE SERPL-SCNC: 1.2 MMOL/L (ref 0.5–2)
DEPRECATED RDW RBC AUTO: 42.2 FL (ref 37–54)
EOSINOPHIL # BLD AUTO: 0.17 10*3/MM3 (ref 0–0.4)
EOSINOPHIL NFR BLD AUTO: 1.6 % (ref 0.3–6.2)
ERYTHROCYTE [DISTWIDTH] IN BLOOD BY AUTOMATED COUNT: 13.4 % (ref 12.3–15.4)
HCO3 BLDA-SCNC: 27.8 MMOL/L (ref 20–26)
HCT VFR BLD AUTO: 40.8 % (ref 37.5–51)
HGB BLD-MCNC: 13.3 G/DL (ref 13–17.7)
HOLD SPECIMEN: NORMAL
HOLD SPECIMEN: NORMAL
IMM GRANULOCYTES # BLD AUTO: 0.03 10*3/MM3 (ref 0–0.05)
IMM GRANULOCYTES NFR BLD AUTO: 0.3 % (ref 0–0.5)
INHALED O2 CONCENTRATION: <21 %
LYMPHOCYTES # BLD AUTO: 3.81 10*3/MM3 (ref 0.7–3.1)
LYMPHOCYTES NFR BLD AUTO: 36.8 % (ref 19.6–45.3)
Lab: ABNORMAL
MCH RBC QN AUTO: 28.1 PG (ref 26.6–33)
MCHC RBC AUTO-ENTMCNC: 32.6 G/DL (ref 31.5–35.7)
MCV RBC AUTO: 86.1 FL (ref 79–97)
MODALITY: ABNORMAL
MONOCYTES # BLD AUTO: 0.97 10*3/MM3 (ref 0.1–0.9)
MONOCYTES NFR BLD AUTO: 9.4 % (ref 5–12)
NEUTROPHILS NFR BLD AUTO: 5.24 10*3/MM3 (ref 1.7–7)
NEUTROPHILS NFR BLD AUTO: 50.7 % (ref 42.7–76)
NRBC BLD AUTO-RTO: 0 /100 WBC (ref 0–0.2)
NT-PROBNP SERPL-MCNC: <36 PG/ML (ref 0–900)
PCO2 BLDA: 38.9 MM HG (ref 35–45)
PH BLDA: 7.46 PH UNITS (ref 7.35–7.45)
PLATELET # BLD AUTO: 252 10*3/MM3 (ref 140–450)
PMV BLD AUTO: 10.9 FL (ref 6–12)
PO2 BLDA: 89.4 MM HG (ref 83–108)
RBC # BLD AUTO: 4.74 10*6/MM3 (ref 4.14–5.8)
SAO2 % BLDCOA: 97.7 % (ref 94–99)
TROPONIN T SERPL HS-MCNC: 7 NG/L
VENTILATOR MODE: ABNORMAL
WBC NRBC COR # BLD: 10.34 10*3/MM3 (ref 3.4–10.8)
WHOLE BLOOD HOLD COAG: NORMAL
WHOLE BLOOD HOLD SPECIMEN: NORMAL

## 2023-06-18 PROCEDURE — 84484 ASSAY OF TROPONIN QUANT: CPT | Performed by: EMERGENCY MEDICINE

## 2023-06-18 PROCEDURE — 85025 COMPLETE CBC W/AUTO DIFF WBC: CPT | Performed by: EMERGENCY MEDICINE

## 2023-06-18 PROCEDURE — 85379 FIBRIN DEGRADATION QUANT: CPT | Performed by: EMERGENCY MEDICINE

## 2023-06-18 PROCEDURE — 80053 COMPREHEN METABOLIC PANEL: CPT | Performed by: EMERGENCY MEDICINE

## 2023-06-18 PROCEDURE — 83880 ASSAY OF NATRIURETIC PEPTIDE: CPT | Performed by: EMERGENCY MEDICINE

## 2023-06-18 PROCEDURE — 96374 THER/PROPH/DIAG INJ IV PUSH: CPT

## 2023-06-18 PROCEDURE — 83735 ASSAY OF MAGNESIUM: CPT | Performed by: EMERGENCY MEDICINE

## 2023-06-18 PROCEDURE — 93010 ELECTROCARDIOGRAM REPORT: CPT | Performed by: INTERNAL MEDICINE

## 2023-06-18 PROCEDURE — 93005 ELECTROCARDIOGRAM TRACING: CPT | Performed by: EMERGENCY MEDICINE

## 2023-06-18 PROCEDURE — 82803 BLOOD GASES ANY COMBINATION: CPT

## 2023-06-18 PROCEDURE — 99284 EMERGENCY DEPT VISIT MOD MDM: CPT

## 2023-06-18 PROCEDURE — 71045 X-RAY EXAM CHEST 1 VIEW: CPT

## 2023-06-18 PROCEDURE — 83605 ASSAY OF LACTIC ACID: CPT | Performed by: EMERGENCY MEDICINE

## 2023-06-18 RX ORDER — SODIUM CHLORIDE 0.9 % (FLUSH) 0.9 %
10 SYRINGE (ML) INJECTION AS NEEDED
Status: DISCONTINUED | OUTPATIENT
Start: 2023-06-18 | End: 2023-06-19 | Stop reason: HOSPADM

## 2023-06-18 RX ORDER — LEVETIRACETAM 500 MG/1
500 TABLET ORAL 2 TIMES DAILY
COMMUNITY

## 2023-06-18 RX ORDER — KETOROLAC TROMETHAMINE 10 MG/1
10 TABLET, FILM COATED ORAL EVERY 6 HOURS PRN
COMMUNITY

## 2023-06-19 VITALS
TEMPERATURE: 97.6 F | SYSTOLIC BLOOD PRESSURE: 120 MMHG | DIASTOLIC BLOOD PRESSURE: 76 MMHG | HEIGHT: 74 IN | OXYGEN SATURATION: 95 % | RESPIRATION RATE: 16 BRPM | BODY MASS INDEX: 28.23 KG/M2 | HEART RATE: 78 BPM | WEIGHT: 220 LBS

## 2023-06-19 LAB
ALBUMIN SERPL-MCNC: 3.9 G/DL (ref 3.5–5.2)
ALBUMIN/GLOB SERPL: 1.3 G/DL
ALP SERPL-CCNC: 96 U/L (ref 39–117)
ALT SERPL W P-5'-P-CCNC: 23 U/L (ref 1–41)
ANION GAP SERPL CALCULATED.3IONS-SCNC: 11 MMOL/L (ref 5–15)
AST SERPL-CCNC: 40 U/L (ref 1–40)
BILIRUB SERPL-MCNC: <0.2 MG/DL (ref 0–1.2)
BUN SERPL-MCNC: 25 MG/DL (ref 8–23)
BUN/CREAT SERPL: 31.3 (ref 7–25)
CALCIUM SPEC-SCNC: 9.6 MG/DL (ref 8.6–10.5)
CHLORIDE SERPL-SCNC: 103 MMOL/L (ref 98–107)
CO2 SERPL-SCNC: 27 MMOL/L (ref 22–29)
CREAT SERPL-MCNC: 0.8 MG/DL (ref 0.76–1.27)
EGFRCR SERPLBLD CKD-EPI 2021: 98.8 ML/MIN/1.73
GLOBULIN UR ELPH-MCNC: 2.9 GM/DL
GLUCOSE SERPL-MCNC: 175 MG/DL (ref 65–99)
MAGNESIUM SERPL-MCNC: 2.2 MG/DL (ref 1.6–2.4)
POTASSIUM SERPL-SCNC: 4.2 MMOL/L (ref 3.5–5.2)
PROT SERPL-MCNC: 6.8 G/DL (ref 6–8.5)
QT INTERVAL: 372 MS
QTC INTERVAL: 450 MS
SODIUM SERPL-SCNC: 141 MMOL/L (ref 136–145)

## 2023-06-19 PROCEDURE — 96374 THER/PROPH/DIAG INJ IV PUSH: CPT

## 2023-06-19 PROCEDURE — 25010000002 METHYLPREDNISOLONE PER 125 MG: Performed by: EMERGENCY MEDICINE

## 2023-06-19 RX ORDER — PREDNISONE 20 MG/1
20 TABLET ORAL DAILY
Qty: 5 TABLET | Refills: 0 | Status: SHIPPED | OUTPATIENT
Start: 2023-06-19 | End: 2023-06-24

## 2023-06-19 RX ORDER — METHYLPREDNISOLONE SODIUM SUCCINATE 125 MG/2ML
125 INJECTION, POWDER, LYOPHILIZED, FOR SOLUTION INTRAMUSCULAR; INTRAVENOUS ONCE
Status: COMPLETED | OUTPATIENT
Start: 2023-06-19 | End: 2023-06-19

## 2023-06-19 RX ORDER — IPRATROPIUM BROMIDE AND ALBUTEROL SULFATE 2.5; .5 MG/3ML; MG/3ML
3 SOLUTION RESPIRATORY (INHALATION) EVERY 6 HOURS PRN
Qty: 60 ML | Refills: 0 | Status: SHIPPED | OUTPATIENT
Start: 2023-06-19

## 2023-06-19 RX ADMIN — METHYLPREDNISOLONE SODIUM SUCCINATE 125 MG: 125 INJECTION INTRAMUSCULAR; INTRAVENOUS at 00:32

## 2023-06-19 NOTE — DISCHARGE INSTRUCTIONS
Please return with new or worsening symptoms.  Steroid and albuterol been sent to the pharmacy for the next few days.  Follow-up with primary care.

## 2023-06-19 NOTE — ED PROVIDER NOTES
Subjective   History of Present Illness  62-year-old male presents the emergency department with complaint of chest pain and shortness of breath.  Started this evening.  He is 24 hours post discharge form once [facility] after a stroke versus seizure work-up.  No reported no pneumonia or aspiration during that episode but likely did have an unresponsive seizure-like episode.  Reportedly was getting lightheaded in route here.  He has history of COPD that is not oxygen dependent and usually only uses an albuterol rescue inhaler.  History of PEs not currently on anticoagulant because of history of hemorrhagic stroke.  History of hyperlipidemia and diabetes but denies any significant cardiac history.    Family history, surgical history, social history, current medications and allergies are reviewed with the patient and triage documentation and vitals are reviewed.     History provided by:  Patient, relative and medical records   used: No      Review of Systems   Constitutional:  Negative for chills and fever.   HENT:  Negative for congestion and sore throat.    Eyes:  Negative for photophobia and visual disturbance.   Respiratory:  Positive for shortness of breath. Negative for cough and wheezing.    Cardiovascular:  Positive for chest pain. Negative for palpitations.   Gastrointestinal:  Negative for abdominal pain, diarrhea, nausea and vomiting.   Endocrine: Negative for polydipsia, polyphagia and polyuria.   Genitourinary:  Negative for dysuria, frequency and urgency.   Musculoskeletal:  Negative for arthralgias, back pain, myalgias and neck pain.   Skin:  Negative for color change and pallor.   Allergic/Immunologic: Negative.    Neurological:  Positive for light-headedness. Negative for speech difficulty, weakness, numbness and headaches.   Hematological: Negative.    Psychiatric/Behavioral: Negative.       Past Medical History:   Diagnosis Date    COPD (chronic obstructive pulmonary  disease)     Diabetes mellitus     GERD (gastroesophageal reflux disease)     Gout     History of transfusion     Hyperlipemia     Sleep apnea     Stroke        No Known Allergies    Past Surgical History:   Procedure Laterality Date    BACK SURGERY      CHOLECYSTECTOMY      COLONOSCOPY N/A 3/14/2017    Procedure: COLONOSCOPY;  Surgeon: Cachorro Perez DO;  Location: [facility];  Service:     HAND SURGERY      LEG SURGERY Right     NOSE SURGERY      PANCREAS SURGERY      SPLENECTOMY         History reviewed. No pertinent family history.    Social History     Socioeconomic History    Marital status:    Tobacco Use    Smoking status: Former     Packs/day: 1.00     Types: Cigarettes     Quit date: 3/1/2023     Years since quittin.3   Vaping Use    Vaping Use: Never used   Substance and Sexual Activity    Alcohol use: Not Currently    Drug use: No    Sexual activity: Defer           Objective   Physical Exam  Vitals and nursing note reviewed.   Constitutional:       General: He is not in acute distress.     Appearance: He is well-developed, normal weight and overweight. He is not ill-appearing, toxic-appearing or diaphoretic.   HENT:      Head: Normocephalic.   Eyes:      Pupils: Pupils are equal, round, and reactive to light.   Neck:      Vascular: No JVD.   Cardiovascular:      Rate and Rhythm: Normal rate and regular rhythm. No extrasystoles are present.     Heart sounds: Normal heart sounds. No murmur heard.  Pulmonary:      Effort: Pulmonary effort is normal. No tachypnea, accessory muscle usage or respiratory distress.      Breath sounds: Examination of the right-upper field reveals decreased breath sounds. Examination of the left-upper field reveals decreased breath sounds. Examination of the right-lower field reveals decreased breath sounds. Examination of the left-lower field reveals decreased breath sounds. Decreased breath sounds present. No wheezing, rhonchi or rales.   Chest:      Chest  wall: No tenderness.   Abdominal:      General: Bowel sounds are normal.      Palpations: Abdomen is soft. There is no hepatomegaly or splenomegaly.      Tenderness: There is no abdominal tenderness. There is no guarding or rebound.   Musculoskeletal:         General: Normal range of motion.      Cervical back: Normal range of motion and neck supple.      Right lower leg: No tenderness. No edema.      Left lower leg: No tenderness. No edema.   Skin:     General: Skin is warm and dry.      Capillary Refill: Capillary refill takes less than 2 seconds.   Neurological:      General: No focal deficit present.      Mental Status: He is alert and oriented to person, place, and time.      Cranial Nerves: No cranial nerve deficit.      Motor: No weakness.   Psychiatric:         Mood and Affect: Mood normal.         Behavior: Behavior normal.       ECG 12 Lead      Date/Time: 6/18/2023 9:53 PM  Performed by: Franki Khalil DO  Authorized by: Franki Khalil DO   Interpreted by physician  Comments: EKG June 18, 2023 at 2135 reveals normal sinus rhythm at a rate of 88 bpm.  T wave flattening in 3 and aVF.  No T wave inversion.  No ST elevation or depression.  No evidence of acute ischemia.  QTc 450.             ED Course      Labs Reviewed   COMPREHENSIVE METABOLIC PANEL - Abnormal; Notable for the following components:       Result Value    Glucose 175 (*)     BUN 25 (*)     BUN/Creatinine Ratio 31.3 (*)     All other components within normal limits    Narrative:     GFR Normal >60  Chronic Kidney Disease <60  Kidney Failure <15     CBC WITH AUTO DIFFERENTIAL - Abnormal; Notable for the following components:    Lymphocytes, Absolute 3.81 (*)     Monocytes, Absolute 0.97 (*)     All other components within normal limits   BLOOD GAS, ARTERIAL - Abnormal; Notable for the following components:    pH, Arterial 7.464 (*)     HCO3, Arterial 27.8 (*)     Base Excess, Arterial 3.9 (*)     All other components within normal  "limits   BNP (IN-HOUSE) - Normal    Narrative:     Among patients with dyspnea, NT-proBNP is highly sensitive for the detection of acute congestive heart failure. In addition NT-proBNP of <300 pg/ml effectively rules out acute congestive heart failure with 99% negative predictive value.    Results may be falsely decreased if patient taking Biotin.     SINGLE HSTROPONIN T - Normal    Narrative:     High Sensitive Troponin T Reference Range:  <10.0 ng/L- Negative Female for AMI  <15.0 ng/L- Negative Male for AMI  >=10 - Abnormal Female indicating possible myocardial injury.  >=15 - Abnormal Male indicating possible myocardial injury.   Clinicians would have to utilize clinical acumen, EKG, Troponin, and serial changes to determine if it is an Acute Myocardial Infarction or myocardial injury due to an underlying chronic condition.        MAGNESIUM - Normal   D-DIMER, QUANTITATIVE - Normal    Narrative:     According to the assay 's published package insert, a normal (<500 ng/mL (FEU)) D-dimer result in conjunction with a non-high clinical probability assessment, excludes deep vein thrombosis (DVT) and pulmonary embolism (PE) with high sensitivity.    D-dimer values increase with age and this can make VTE exclusion of an older population difficult. To address this, the [employer], based on best available evidence and recent guidelines, recommends that clinicians use age-adjusted D-dimer thresholds in patients greater than 50 years of age with: a) a low probability of PE who do not meet all Pulmonary Embolism Rule Out Criteria, or b) in those with intermediate probability of PE.   The formula for an age-adjusted D-dimer cut-off is \"age*10\".  For example, a 60 year old patient would have an age-adjusted cut-off of 600 ng/mL (FEU) and an 80 year old 800 ng/mL (FEU).     LACTIC ACID, PLASMA - Normal   RAINBOW DRAW    Narrative:     The following orders were created for panel order [place] " Draw.  Procedure                               Abnormality         Status                     ---------                               -----------         ------                     Green Top (Gel)[[id number]]                                  Final result               Lavender Top[[id number]]                                     Final result               Gold Top - SST[[id number]]                                   Final result               Light Blue Top[[id number]]                                   Final result                 Please view results for these tests on the individual orders.   BLOOD GAS, ARTERIAL   CBC AND DIFFERENTIAL    Narrative:     The following orders were created for panel order CBC & Differential.  Procedure                               Abnormality         Status                     ---------                               -----------         ------                     CBC Auto Differential[[id number]]        Abnormal            Final result                 Please view results for these tests on the individual orders.   GREEN TOP   LAVENDER TOP   GOLD TOP - SST   LIGHT BLUE TOP     XR Shoulder 2+ View Left    Result Date: 6/13/2023  Narrative: Chronic left shoulder pain 2 Views left shoulder: Shoulder is normally aligned and no fracture or other bony abnormality . The glenohumeral joint is in good condition . There is an old healed fracture of the left fourth rib .    Impression:   Negative [id number]    CT Head Without Contrast    Result Date: 6/17/2023  Narrative: COMPARISON:     6/16/2023, 6/15/2023, 2/21/2023 INDICATION: Stroke follow-up TECHNIQUE: CT of the head was obtained from the vertex through the skull base.  Reformats are obtained. All CT scans at this location are performed using dose modulation techniques as appropriate to a performed exam including the following: automated exposure control; adjustment of the mA and/or kV according to patient size (this includes techniques or  standardized protocols for targeted exams where dose is matched to indication / reason for exam; i.e. extremities or head); use of iterative reconstruction technique. FINDINGS: There is prominence of ventricles and sulci consistent with atrophy. There is hypoattenuation of the periventricular white matter, consistent with small vessel disease. Chronic encephalomalacia within the left frontal lobe, right frontoparietal  occipital lobes. There is subtle peripherally hyperdense collection of the right parietal vertex. This measures approximately 2.9 x 2.0 x 2.0 cm. There is no mass effect or midline shift.  There is no abnormal intra-axial or extra-axial fluid collection  or hematoma.  .  The bony calvarium is intact.  Mucosal thickening of ethmoid air cells. Mastoid air cells appear well-aerated.    Impression:        1. Subtle peripherally hyperdense collection of the right frontal parietal vertex, described on recent MRI and is consistent with chronic hematoma. This was seen acutely in February. This measures similar to recent MRI 2. Atrophy. 3. Small vessel ischemic disease. 4. Multifocal old encephalomalacia changes. Workstation: [id number]    CT Head Without Contrast    Result Date: 6/15/2023  Narrative: EXAM DESCRIPTION: CT Head w/o Contrast INDICATION: Weakness, history of stroke COMPARISON: February 2021 exam TECHNIQUE: Multiple axial tomographic images are obtained from the skull base to the vertex.  This exam was performed using radiation doses that are As Low As Reasonably Achievable (ALARA). (This exam was performed according to our departmental up-to-date the dose optimization program, which includes automated exposure control, adjustment of mA and/or kV according to patient's size and/or use of iterative reconstruction technique). FINDINGS: Tiny residual petechial blood at the bilateral superior convexities related to evolving hemorrhagic infarcts are seen. Otherwise no acute intracranial bleed is  identified. Extensive encephalomalacia related to patient's previous insults are seen at both superior convexities. Previously seen hemorrhagic infarction at right frontoparietal region is evolved. No midline shift, mass effect or hydrocephalus has developed. The visualized mastoids and sinuses are well aerated.      Impression:    No acute intracranial bleed is seen. Large area of multifocal encephalomalacia related to patient's previous insults. Tiny petechial blood at previous insults of superior convexity parietal lobes are present with significant improvement of the large   intraparenchymal hematoma at right superior parietal cortex. Workstation: [id number]    MRI Brain Without Contrast    Result Date: 6/16/2023  Narrative: MR BRAIN WITHOUT IV CONTRAST Comparisons: Head CT scan dated M[place] 15, 2020 01/08/2023 Additional pertinent history: Neuro deficit TECHNIQUE:  Multiplanar, multisequence brain MRI was performed without gadolinium contrast. FINDINGS: Midline shift: None. Ventricles: Mild enlargement of the lateral and third ventricles in keeping with the degree of atrophy present. Brain parenchyma:   Diffusion weighted imaging: Region of restricted diffusion within the high posterior right frontal lobe which corresponds to a new region of increased T2 signal on the FLAIR sequence concerning for interval development of a region of intraparenchymal hemorrhage   Gradient sequence: Magnetic susceptibility surrounding the region of increased T2 signal within the high posterior right frontal lobe compatible with a developing intraparenchymal hematoma. This measures 2.0 x 3.0 cm.   T2 weighted FLAIR images: Multiple regions of increased T2 signal with volume loss involving the left frontal lobe as well as the left parietal lobe and right parietal lobe compatible with remote areas of infarct. Patchy hypoattenuation within the periventricular and subcortical white matter compatible with small vessel ischemic change on  a chronic basis. Region of increased T2 signal corresponding to the region of gradient signal abnormality within the high left frontal lobe concerning for a developing intraparenchymal hematoma. Extra-axial spaces: Mild cerebral atrophy. Mastoid air cells and paranasal sinuses: Negative. Surrounding soft tissues and orbits: Negative.    Impression:    1. Superimposed upon the regions of remote infarct and age-related changes or findings consistent with a developing intraparenchymal hematoma within the high posterior right frontal lobe without significant mass effect. 2. No restricted diffusion or distant from this region of hemorrhage to suggest an acute area of infarct away from the hemorrhage. Workstation: [id number]    XR Chest 1 View    Result Date: 6/18/2023  Narrative: INDICATION: short of breath. COMPARISON: 5/31/2023. FINDINGS: Allowing for low lung volumes, heart size, lungs, and pleural spaces are felt to be within normal limits.     XR Chest 1 View    Result Date: 5/31/2023  Narrative: EXAMINATION PERFORMED: SINGLE VIEW CHEST COMPARISON: Chest 5/15/2023. HISTORY: Shortness of breath. FINDINGS: The heart size is within normal limits.  Minimal linear opacities in the right lung base favoring atelectasis over pneumonia.  The lungs are otherwise clear with low normal lung volumes.     Impression: There are predominantly linear opacities in the right lung base, most likely represent atelectasis. Lungs are otherwise clear.    Echo complete with bubble study    Result Date: 6/17/2023  Narrative: Left Ventricle   Left ventricular ejection fraction is normal, with an ejection fraction of 55-60%. Left ventricular segmental wall motion is normal. The left ventricular diastolic function is grade II diastolic dysfunction. Left ventricular chamber dimension is normal. There is no increased left ventricular wall thickness. Right Ventricle   Right ventricular chamber dimension is enlarged. Right ventricular systolic  function is normal. Left Atria   Left atrial chamber dimension is normal. Right Atria   Right atrial chamber dimension is enlarged. Atrial Septum   Intact interatrial septum visualized by agitated saline imaging. Pericardium/Pleural   The pericardium appears normal. There is no pericardial effusion. Inferior Vena Cava   Normal inferior vena cava with >50% collapse upon inspiration consistent with normal right atrial pressure, 3 mmHg. Aorta   The aortic measurements are indexed to age and body surface area. The aortic root at the sinus of Valsalva is normal measuring 3.20 cm with an index of 1.53 cm/m2. The prox ascending aorta is normal measuring 2.90 cm with an index of 1.38 cm/m2. Summary   1. Normal LV systolic function, normal wall motion, EF 55%.   2. Grade 2 diastolic dysfunction left ventricle noted.   3. Mild to moderate MR.   4. Mild TR.   5. Mild pulmonary hypertension, RVSP 37 mm Hg.   6. No PFO, bubble study negative. Report Signatures Finalized by Jeff Riley MD on 2023 11:37 PM Patient Info Name:     KILO ROSE Age:     62 years :     1960 Gender:     Male MRN:     [id number] Accession #:     [id number] Ht:     188 cm Wt:     84 kg BSA:     2.09 m2 BP:     122 /     62 mmHg Technical Quality:     Fair Exam Date:     2023 2:46 PM Site Location:     [facility] - Echo Exam Location:     [facility] - Echo Patient Status:     Inpatient Admit Date:     2023 Staff Ordering Physician:     MARCO A HORTON Sonographer:     Licha Kevin BS, RDCS Referring Physician:     MARCO A HORTON ; Exam Type:     ECHO 2D COMPLETE WITH BUBBLE STUDY Study Info Indications     I69.992 - Facial weakness following unspecified cerebrovascular disease Complete two-dimensional, color flow and Doppler transthoracic echocardiogram  is performed with agitated saline. Account #:     [id number] Aortic Valve   The aortic valve is trileaflet. There is Aortic valve sclerosis. There is no aortic valve stenosis with a  peak velocity of 1.25 m/s, mean gradient of 4 mmHg, and aortic valve area of 2.78 cm2. There is no aortic valve regurgitation. Pulmonic Valve   The pulmonic valve is not well visualized. There is no pulmonic valve stenosis. There is no pulmonic regurgitation. Mitral Valve   The mitral valve has thickened leaflets. There is no mitral valve stenosis. There is mild to moderate mitral valve regurgitation. Tricuspid Valve   Mild pulmonary hypertension, estimated pulmonary arterial systolic pressure is 37 mmHg. The tricuspid valve leaflets are normal. There is no tricuspid valve stenosis. There is mild tricuspid valve regurgitation. Left Ventricular Outflow Tract ---------------------------------------------------------------------- Name                                 Value        Normal ---------------------------------------------------------------------- LVOT 2D ---------------------------------------------------------------------- LVOT Diameter                      2.00 cm               LVOT Doppler ---------------------------------------------------------------------- LVOT Peak Velocity                1.22 m/s               LVOT Peak Gradient                  6 mmHg               LVOT Mean Gradient                  3 mmHg               LVOT VTI                          23.40 cm               LVOT VTI/AV VTI Ratio                 0.89               LVOT Stroke Volume                73.48 ml Pulmonic Valve ---------------------------------------------------------------------- Name                                 Value        Normal ---------------------------------------------------------------------- RVOT Doppler ---------------------------------------------------------------------- RVOT Peak Velocity                0.99 m/s               RVOT Peak Gradient                  4 mmHg               PV Doppler ---------------------------------------------------------------------- PV Peak Velocity                  1.28 m/s                PV Peak Gradient                    7 mmHg Mitral Valve ---------------------------------------------------------------------- Name                                 Value        Normal ---------------------------------------------------------------------- MV Doppler ---------------------------------------------------------------------- MV PHT                               63 ms               MV Area (PHT)                     3.48 cm2     4.00-5.00 MV Diastolic Function ---------------------------------------------------------------------- MV E Peak Velocity                0.90 m/s               MV A Peak Velocity                1.01 m/s               MV E/A                                0.89               MV Decel Time                       218 ms               MV A Wave Duration                     0 s               MV Annular TDI ---------------------------------------------------------------------- MV Septal s' Velocity             0.08 m/s               MV Septal e' Velocity             0.10 m/s        >=0.08 MV Septal a' Velocity             0.08 m/s               MV E/e' (Septal)                      8.63        <=8.00 MV A/a' (Septal)                     12.90               MV Lateral s' Velocity            0.09 m/s               MV Lateral e' Velocity            0.09 m/s        >=0.10 MV Lateral a' Velocity            0.10 m/s               MV E/e' (Lateral)                    10.31        <=8.00 MV A/a' (Lateral)                    10.32               MV e' Avg                        9.55 cm/s               MV E/e' (Average)                     9.47 Tricuspid Valve ---------------------------------------------------------------------- Name                                 Value        Normal ---------------------------------------------------------------------- TV Regurgitation Doppler ---------------------------------------------------------------------- TR Peak Velocity                  2.93  m/s               TR Peak Gradient                   34 mmHg               Estimated PAP/RSVP ---------------------------------------------------------------------- RA Pressure                         3 mmHg           <=5 PA Systolic Pressure               37 mmHg           <36 RV Systolic Pressure               37 mmHg           <36 TV Annular TDI ---------------------------------------------------------------------- TV Lateral Mireille s' Velocity        0.15 m/s     0.10-0.19 TV Lateral Mireille e' Velocity        0.13 m/s     0.08-0.20 TV Lateral Mireille a' Velocity        0.17 m/s Pulmonary Vessels ---------------------------------------------------------------------- Name                                 Value        Normal ---------------------------------------------------------------------- Pulmonary Veins ---------------------------------------------------------------------- Pulm Vein Peak Systolic Velocity                          0.59 m/s               Pulm Vein Peak Diastolic Velocity                          0.51 m/s               Pulm Vein S/D Velocity Ratio          1.16               Pulm Vein Ar Velocity             0.29 m/s               Pulm Vein Ar Dur - MV A Dur            0 s Aorta ---------------------------------------------------------------------- Name                                 Value        Normal ---------------------------------------------------------------------- Ascending Aorta ---------------------------------------------------------------------- Ao Root Diameter (2D)              3.20 cm         <3.40 Ao Root Diam Index (2D)         1.53 cm/m2     1.50-1.90 Prox Asc Ao Diameter               2.90 cm     2.60-3.40 Prox Asc Ao Diameter Index      1.38 cm/m2     1.30-1.70 Aortic Valve ---------------------------------------------------------------------- Name                                 Value        Normal ---------------------------------------------------------------------- AV Doppler  ---------------------------------------------------------------------- AV Peak Velocity                  1.25 m/s               AV Peak Gradient                    6 mmHg               AV Mean Gradient                    4 mmHg               AV VTI                            26.40 cm               AV Area (Cont Eq VTI)             2.78 cm2        >=3.00 AV Area (Cont Eq Arthur)             3.07 cm2               AV V1/V2 Ratio                        0.98               AV Regurgitation 2D ---------------------------------------------------------------------- LVOT Area                         3.14 cm2 Ventricles ---------------------------------------------------------------------- Name                                 Value        Normal ---------------------------------------------------------------------- LV Dimensions 2D/MM ---------------------------------------------------------------------- IVS Diastolic Thickness (2D)       0.92 cm     0.60-1.00 LVID Diastole (2D)                 5.16 cm     4.20-5.80 LVIW Diastolic Thickness (2D)                               0.89 cm     0.60-1.00 LVID Systole (2D)                  3.81 cm     2.50-4.00 LVOT Diameter                      2.00 cm               LV Mass (2D Cubed)                167.41 g  88..00 LV Mass Index (2D Cubed)        0.01 g/cm2     0.00-0.01 Relative Wall Thickness (2D)          0.34               LV Fractional Shortening/Ejection Fraction 2D/MM ---------------------------------------------------------------------- LV Fractional Shortening (2D)                                  26 %         25-43 LV Diastolic Volume (4C MOD)      99.20 ml               LV Diastolic Volume (2C MOD)      81.20 ml               LV Diastolic Volume (BP MOD)      98.50 ml  62..00 LV Diastolic Volume Index (BP MOD)                       47.03 ml/m2   34.00-74.00 LV EF (BP MOD)                        65 %         52-72 LV Diastolic Length (4C)           7.16 cm                LV Systolic Length (4C)            5.91 cm               LV End Diastolic Volume (BP A-L)                              99.96 ml               LV End Systolic Volume (BP A-L)                              32.78 ml               LV Stroke Volume (4C MOD)         64.20 ml               RV Dimensions 2D/MM ---------------------------------------------------------------------- RVID Diastole (2D)                 2.86 cm     2.50-3.50 RV Basal Diastolic Dimension       5.14 cm     2.50-4.10 RV Mid-[place] Diastolic Dimension                          4.32 cm     1.90-3.50 [place]ria ---------------------------------------------------------------------- Name                                 Value        Normal ---------------------------------------------------------------------- LA Dimensions ---------------------------------------------------------------------- LA Dimension (2D)                  3.80 cm     3.00-4.10 LA Dimen Index (2D)             1.81 cm/m2               LA Volume (4C A-L)                53.00 ml               LA Volume (BP A-L)                50.72 ml               RA Dimensions ---------------------------------------------------------------------- RA ESV (4C MOD)                   76.20 ml   18.00-32.00    CT Perfusion    Result Date: 6/15/2023  Narrative: EXAMINATION: CT PERFUSION CLINICAL INDICATION: TECHNIQUE: Axial CT of the head with multiplanar reformations. CONTRAST: 50 mL of Omnipaque IV. COMPARISON: Multiple previous and recent CT exams FINDINGS: This exam was performed according to our departmental dose-optimization program which includes automated exposure control, adjustment of the mA and/or kV according to patient size and/or use of iterative reconstruction technique. 3-D rendering reformatted images are also provided. Multifocal large area of infarctions are giving a false rapid positive results in this perfusion CTA. No salvageable brain tissue is visible. CBF < 30% Volume: 19 mL. Mostly  in the right parietal cortex related to patient's previous hemorrhagic infarct and associated encephalomalacia. Tmax > 6.0s Volume: 110 mL. Related to patient's previous hemorrhagic insult in the right parietal cortex.    Impression: No salvageable brain tissue seen. Multifocal old insults especially involving in the right superior posterior parietal cortex related to patient's previous hemorrhagic infarction. Workstation: [id number]    CTA Head Neck w/Contrast STROKE w/LVO per Contrast Protocol    Result Date: 6/15/2023  Narrative: STUDY: CT ARTERIOGRAPHY OF THE HEAD AND NECK DATE: 6/15/2023 12:33 PM CDT INDICATION: Acute neurologic deficit, unresponsive, rt side weakness; 75ml of omnipaque 350 given in rt 16g ac by 5.0 power inj no problems; Dose CT DI->94.5; CT DLP->410.6 COMPARISON: CT head dated 6/15/2023 and 2/21/2023; MR brain dated 12/10/2021 TECHNIQUE: Computed tomographic imaging was performed through the head and neck following bolus administration of 75 mL Omnipaque 350.  Reconstructed 3D images of the arteries of the [place] and the carotid and vertebral arteries were generated and reviewed. This examination was performed according to our departmental dose optimization program which includes automated exposure control, adjustment of the MA and kV according to patient size, and/or use of iterative reconstruction technique. CTA HEAD FINDINGS: ANTERIOR CIRCULATION: Right intracranial internal carotid artery (ICA): Patent with no significant stenosis. Right anterior cerebral artery (ESTHELA): Patent with no significant stenosis. Right middle cerebral artery (MCA): The M1 segment is patent with no significant stenosis Left intracranial internal carotid artery (ICA): Patent with no significant stenosis. Left anterior cerebral artery (ESTHELA): Patent with no significant stenosis. Left middle cerebral artery (MCA): M1 and M2 segments are patent with no significant stenosis. POSTERIOR CIRCULATION: Right  vertebral artery (V4 segment): Small caliber with no focal stenosis. Right posterior cerebral artery (PCA): There is stenosis of the midportion which is nonspecific but may be related to encephalomalacia in the occipital lobe. Left vertebral artery (V4 segment): Patent with no significant stenosis. Left posterior cerebral artery (PCA): Near persistence of fetal origin with no significant stenosis. Basilar artery: Patent with no significant stenosis. CTA NECK FINDINGS: Aortic arch: 3 vessel, left sided arch. Brachiocephalic artery: Patent with no significant stenosis Right subclavian artery: Patent with no significant stenosis Right common carotid artery: Patent with no significant stenosis Right internal carotid artery (cervical segment): Patent with no significant stenosis Left common carotid artery: Patent with no significant stenosis Left internal carotid artery (cervical segment): Patent with no significant stenosis Left subclavian artery: Patent with no significant stenosis Right vertebral artery: (cervical segment): Patent with no significant stenosis  Subclavian origin. Left vertebral artery (cervical segment): Patent with no significant stenosis  Subclavian origin. Ancillary findings: Emphysematous changes are seen in the upper lungs.  The thyroid has normal size.  Multilevel osteoarthritis of the cervical spine.  The patient is edentulous.  Postoperative changes from anterior cervical fusion at C5-6.  Chronic fractures of the T1 and C7 spinous processes are unchanged from the comparison exam.  No cervical soft tissue mass is identified. Comment: Internal carotid artery stenosis calculated using NASCET criteria.    Impression: 1.  There is stenosis of the mid and distal right posterior cerebral artery which is age indeterminant.  However, given the area of encephalomalacia involving the right occipital lobe on the comparison CT, this finding is favored to be chronic.   Otherwise, No hemodynamically  significant stenoses, occlusion, or aneurysms involving the major arteries of the [place].  If there is high clinical suspicion for stroke, recommend MRI for further characterization. 2.  No hemodynamically significant stenosis, occlusion, or dissection involving the extradural carotid or vertebral arteries. Workstation: [id number]          Medical Decision Making  Problems Addressed:  COPD with acute exacerbation: complicated acute illness or injury    Amount and/or Complexity of Data Reviewed  Labs: ordered. Decision-making details documented in ED Course.  Radiology: ordered. Decision-making details documented in ED Course.  ECG/medicine tests: ordered and independent interpretation performed. Decision-making details documented in ED Course.    Risk  Prescription drug management.  Decision regarding hospitalization.    D-dimer negative.  Patient remains with normal oxygen saturations throughout stay on room air.  Concern for possible fluid overload or pneumonia given his recent hospitalization reveals laboratory studies with no evidence of infectious process.  BMP unremarkable and chest x-ray clear.  Blood cell count is not elevated and he does not have productive cough or fever.  Lactic acid normal.  Troponin negative as well.  Discussed treatment of COPD exacerbation with patient.  Breathing treatments as well as prednisone is sent to the pharmacy.  Treated with Solu-Medrol here.  Patient knowledges understanding of treatment and plan and outpatient follow-up with primary care and agreeable to discharge at this time.    Final diagnoses:   COPD with acute exacerbation        ED Disposition  ED Disposition       ED Disposition   Discharge    Condition   Stable    Comment   --               Guanako Atkins MD  [address] [place] [id number] [phone]               Medication List        New Prescriptions      ipratropium-albuterol 0.5-2.5 mg/3 ml nebulizer  Commonly known as: DUO-NEB  Take 3  mL by nebulization Every 6 (Six) Hours As Needed for Wheezing.     predniSONE 20 MG tablet  Commonly known as: DELTASONE  Take 1 tablet by mouth Daily for 5 days.               Where to Get Your Medications        These medications were sent to [facility]/pharmacy #[id number] - [place], [place] - [address] [place] [address] - [phone]  - [phone] [address], [facility] [address]      Phone: [phone]   ipratropium-albuterol 0.5-2.5 mg/3 ml nebulizer  predniSONE 20 MG tablet            Franki Khalil,   06/19/23 0024

## 2023-07-05 LAB
QT INTERVAL: 352 MS
QTC INTERVAL: 444 MS

## 2023-08-24 ENCOUNTER — HOSPITAL ENCOUNTER (EMERGENCY)
Facility: HOSPITAL | Age: 63
Discharge: HOME | End: 2023-08-24
Attending: EMERGENCY MEDICINE
Payer: MEDICARE

## 2023-08-24 ENCOUNTER — APPOINTMENT (OUTPATIENT)
Dept: GENERAL RADIOLOGY | Facility: HOSPITAL | Age: 63
End: 2023-08-24
Payer: MEDICARE

## 2023-08-24 VITALS
WEIGHT: 180 LBS | HEART RATE: 79 BPM | HEIGHT: 78 IN | BODY MASS INDEX: 20.83 KG/M2 | RESPIRATION RATE: 18 BRPM | DIASTOLIC BLOOD PRESSURE: 60 MMHG | TEMPERATURE: 98.2 F | SYSTOLIC BLOOD PRESSURE: 116 MMHG | OXYGEN SATURATION: 91 %

## 2023-08-24 DIAGNOSIS — I95.2 HYPOTENSION DUE TO DRUGS: Primary | ICD-10-CM

## 2023-08-24 DIAGNOSIS — Z91.89 AT RISK FOR POLYPHARMACY: ICD-10-CM

## 2023-08-24 DIAGNOSIS — F32.A DEPRESSION, UNSPECIFIED DEPRESSION TYPE: ICD-10-CM

## 2023-08-24 DIAGNOSIS — R53.81 PHYSICAL DEBILITY: ICD-10-CM

## 2023-08-24 LAB
ALBUMIN SERPL-MCNC: 4.3 G/DL (ref 3.5–5.2)
ALBUMIN/GLOB SERPL: 1.3 G/DL
ALP SERPL-CCNC: 93 U/L (ref 39–117)
ALT SERPL W P-5'-P-CCNC: 13 U/L (ref 1–41)
ANION GAP SERPL CALCULATED.3IONS-SCNC: 12 MMOL/L (ref 5–15)
AST SERPL-CCNC: 18 U/L (ref 1–40)
BASOPHILS # BLD AUTO: 0.13 10*3/MM3 (ref 0–0.2)
BASOPHILS NFR BLD AUTO: 1.2 % (ref 0–1.5)
BILIRUB SERPL-MCNC: 0.2 MG/DL (ref 0–1.2)
BILIRUB UR QL STRIP: NEGATIVE
BUN SERPL-MCNC: 17 MG/DL (ref 8–23)
BUN/CREAT SERPL: 20.2 (ref 7–25)
CALCIUM SPEC-SCNC: 9.5 MG/DL (ref 8.6–10.5)
CHLORIDE SERPL-SCNC: 101 MMOL/L (ref 98–107)
CLARITY UR: CLEAR
CO2 SERPL-SCNC: 25 MMOL/L (ref 22–29)
COLOR UR: YELLOW
CREAT SERPL-MCNC: 0.84 MG/DL (ref 0.76–1.27)
D-LACTATE SERPL-SCNC: 0.5 MMOL/L (ref 0.5–2)
DEPRECATED RDW RBC AUTO: 42.7 FL (ref 37–54)
EGFRCR SERPLBLD CKD-EPI 2021: 97.4 ML/MIN/1.73
EOSINOPHIL # BLD AUTO: 0.15 10*3/MM3 (ref 0–0.4)
EOSINOPHIL NFR BLD AUTO: 1.4 % (ref 0.3–6.2)
ERYTHROCYTE [DISTWIDTH] IN BLOOD BY AUTOMATED COUNT: 14.1 % (ref 12.3–15.4)
FLUAV SUBTYP SPEC NAA+PROBE: NOT DETECTED
FLUBV RNA ISLT QL NAA+PROBE: NOT DETECTED
GLOBULIN UR ELPH-MCNC: 3.4 GM/DL
GLUCOSE SERPL-MCNC: 218 MG/DL (ref 65–99)
GLUCOSE UR STRIP-MCNC: ABNORMAL MG/DL
HCT VFR BLD AUTO: 43.9 % (ref 37.5–51)
HGB BLD-MCNC: 14.4 G/DL (ref 13–17.7)
HGB UR QL STRIP.AUTO: NEGATIVE
HOLD SPECIMEN: NORMAL
IMM GRANULOCYTES # BLD AUTO: 0.02 10*3/MM3 (ref 0–0.05)
IMM GRANULOCYTES NFR BLD AUTO: 0.2 % (ref 0–0.5)
KETONES UR QL STRIP: NEGATIVE
LEUKOCYTE ESTERASE UR QL STRIP.AUTO: NEGATIVE
LYMPHOCYTES # BLD AUTO: 3.07 10*3/MM3 (ref 0.7–3.1)
LYMPHOCYTES NFR BLD AUTO: 28 % (ref 19.6–45.3)
MAGNESIUM SERPL-MCNC: 2 MG/DL (ref 1.6–2.4)
MCH RBC QN AUTO: 27.3 PG (ref 26.6–33)
MCHC RBC AUTO-ENTMCNC: 32.8 G/DL (ref 31.5–35.7)
MCV RBC AUTO: 83.3 FL (ref 79–97)
MONOCYTES # BLD AUTO: 0.84 10*3/MM3 (ref 0.1–0.9)
MONOCYTES NFR BLD AUTO: 7.7 % (ref 5–12)
NEUTROPHILS NFR BLD AUTO: 6.77 10*3/MM3 (ref 1.7–7)
NEUTROPHILS NFR BLD AUTO: 61.5 % (ref 42.7–76)
NITRITE UR QL STRIP: NEGATIVE
NRBC BLD AUTO-RTO: 0 /100 WBC (ref 0–0.2)
PH UR STRIP.AUTO: 5.5 [PH] (ref 5–9)
PLATELET # BLD AUTO: 240 10*3/MM3 (ref 140–450)
PMV BLD AUTO: 11.6 FL (ref 6–12)
POTASSIUM SERPL-SCNC: 4.3 MMOL/L (ref 3.5–5.2)
PROT SERPL-MCNC: 7.7 G/DL (ref 6–8.5)
PROT UR QL STRIP: NEGATIVE
RBC # BLD AUTO: 5.27 10*6/MM3 (ref 4.14–5.8)
SARS-COV-2 RNA RESP QL NAA+PROBE: NOT DETECTED
SODIUM SERPL-SCNC: 138 MMOL/L (ref 136–145)
SP GR UR STRIP: 1.04 (ref 1–1.03)
T4 FREE SERPL-MCNC: 1.35 NG/DL (ref 0.93–1.7)
TROPONIN T SERPL HS-MCNC: 10 NG/L
TSH SERPL DL<=0.05 MIU/L-ACNC: 1.9 UIU/ML (ref 0.27–4.2)
UROBILINOGEN UR QL STRIP: ABNORMAL
WBC NRBC COR # BLD: 10.98 10*3/MM3 (ref 3.4–10.8)
WHOLE BLOOD HOLD COAG: NORMAL
WHOLE BLOOD HOLD SPECIMEN: NORMAL

## 2023-08-24 PROCEDURE — 87636 SARSCOV2 & INF A&B AMP PRB: CPT | Performed by: EMERGENCY MEDICINE

## 2023-08-24 PROCEDURE — 25010000002 KETOROLAC TROMETHAMINE PER 15 MG: Performed by: EMERGENCY MEDICINE

## 2023-08-24 PROCEDURE — 93005 ELECTROCARDIOGRAM TRACING: CPT | Performed by: EMERGENCY MEDICINE

## 2023-08-24 PROCEDURE — 96374 THER/PROPH/DIAG INJ IV PUSH: CPT

## 2023-08-24 PROCEDURE — 84439 ASSAY OF FREE THYROXINE: CPT | Performed by: EMERGENCY MEDICINE

## 2023-08-24 PROCEDURE — 84443 ASSAY THYROID STIM HORMONE: CPT | Performed by: EMERGENCY MEDICINE

## 2023-08-24 PROCEDURE — 87040 BLOOD CULTURE FOR BACTERIA: CPT | Performed by: EMERGENCY MEDICINE

## 2023-08-24 PROCEDURE — 80053 COMPREHEN METABOLIC PANEL: CPT | Performed by: EMERGENCY MEDICINE

## 2023-08-24 PROCEDURE — 93005 ELECTROCARDIOGRAM TRACING: CPT

## 2023-08-24 PROCEDURE — 83735 ASSAY OF MAGNESIUM: CPT | Performed by: EMERGENCY MEDICINE

## 2023-08-24 PROCEDURE — 81003 URINALYSIS AUTO W/O SCOPE: CPT

## 2023-08-24 PROCEDURE — 99284 EMERGENCY DEPT VISIT MOD MDM: CPT

## 2023-08-24 PROCEDURE — 85025 COMPLETE CBC W/AUTO DIFF WBC: CPT

## 2023-08-24 PROCEDURE — 71045 X-RAY EXAM CHEST 1 VIEW: CPT

## 2023-08-24 PROCEDURE — 83605 ASSAY OF LACTIC ACID: CPT | Performed by: EMERGENCY MEDICINE

## 2023-08-24 PROCEDURE — 84484 ASSAY OF TROPONIN QUANT: CPT

## 2023-08-24 RX ORDER — SODIUM CHLORIDE 0.9 % (FLUSH) 0.9 %
10 SYRINGE (ML) INJECTION AS NEEDED
Status: DISCONTINUED | OUTPATIENT
Start: 2023-08-24 | End: 2023-08-24 | Stop reason: HOSPADM

## 2023-08-24 RX ORDER — KETOROLAC TROMETHAMINE 15 MG/ML
15 INJECTION, SOLUTION INTRAMUSCULAR; INTRAVENOUS ONCE
Status: COMPLETED | OUTPATIENT
Start: 2023-08-24 | End: 2023-08-24

## 2023-08-24 RX ORDER — NICOTINE 21 MG/24HR
1 PATCH, TRANSDERMAL 24 HOURS TRANSDERMAL EVERY 24 HOURS
Qty: 28 EACH | Refills: 1 | Status: SHIPPED | OUTPATIENT
Start: 2023-08-24

## 2023-08-24 RX ADMIN — SODIUM CHLORIDE 1000 ML: 9 INJECTION, SOLUTION INTRAVENOUS at 11:00

## 2023-08-24 RX ADMIN — KETOROLAC TROMETHAMINE 15 MG: 15 INJECTION, SOLUTION INTRAMUSCULAR; INTRAVENOUS at 15:39

## 2023-08-24 NOTE — ED PROVIDER NOTES
Subjective   History of Present Illness  This is a 62-year-old male who presents for evaluation of hypotension and associated fatigue and orthostatic dizziness.  Symptoms have been present on several days over the past week and were especially noticeable this morning during attempts at physical therapy.  According to family the patient had a disabling stroke with left hemiparesis earlier this year.  He spent a couple of months and rehabilitation and then returned home still having deficits in his activities of daily living.  In-home services were arranged which typically come 1 to 2 days/week.  The patient's wife thinks he needs significantly more support but between insurance and primary care orders he has not yet received the services.  She states that he has been having issues with fatigue and decreased interest in daily activities since his stroke.  He spends most of his day in bed.  He continues to smoke.  He will sometimes get up and be on the couch but then will request to go back to his bed.  She states that normally when he wakes up his blood pressure will be normal range but after receiving morning medications he will develop dizziness and lightheadedness.  This morning he attempted physical therapy and had a near syncopal event with associated blood pressures as low as 76 systolic.  They could not get a hold of their primary care physician and so they brought him to the emergency department.  The patient denies recent fevers or chills.  Denies chest pain or persistent shortness of breath.  He denies vomiting or diarrhea.  He denies rash or itching.      Review of Systems   All other systems reviewed and are negative.    Past Medical History:   Diagnosis Date    COPD (chronic obstructive pulmonary disease)     Diabetes mellitus     GERD (gastroesophageal reflux disease)     Gout     History of transfusion     Hyperlipemia     Sleep apnea     Stroke        No Known Allergies    Past Surgical History:  "  Procedure Laterality Date    BACK SURGERY      CHOLECYSTECTOMY      COLONOSCOPY N/A 3/14/2017    Procedure: COLONOSCOPY;  Surgeon: Cachorro Perez DO;  Location: [facility] ENDOSCOPY;  Service:     HAND SURGERY      LEG SURGERY Right     NOSE SURGERY      PANCREAS SURGERY      SPLENECTOMY         History reviewed. No pertinent family history.    Social History     Socioeconomic History    Marital status:    Tobacco Use    Smoking status: Every Day     Packs/day: 1.00     Types: Cigarettes     Last attempt to quit: 3/1/2023     Years since quittin.4   Vaping Use    Vaping Use: Never used   Substance and Sexual Activity    Alcohol use: Not Currently    Drug use: No    Sexual activity: Defer           Objective   Physical Exam  Vitals and nursing note reviewed.   Constitutional:       Comments: Somewhat somnolent and disinterested in conversation.  Easily arousable verbally.   HENT:      Head: Normocephalic and atraumatic.      Mouth/Throat:      Mouth: Mucous membranes are moist.   Eyes:      General: No scleral icterus.  Cardiovascular:      Rate and Rhythm: Normal rate and regular rhythm.      Heart sounds: No murmur heard.  Pulmonary:      Effort: Pulmonary effort is normal.      Breath sounds: Normal breath sounds.   Abdominal:      General: Abdomen is flat.      Tenderness: There is no abdominal tenderness.   Musculoskeletal:         General: No signs of injury.   Skin:     General: Skin is warm and dry.   Neurological:      Mental Status: He is oriented to person, place, and time.      Comments: Left hemiparesis   Psychiatric:      Comments: Seems depressed       Procedures           ED Course                                   LURDES reviewed by Mg Grimaldo DO       Medical Decision Making  The patient's recent past medical charts for this facility as well as outside facilities via the \"care everywhere\" application of epic reviewed.  The patient has multiple medical comorbidities and associated " medications that could contribute to hypotension.  The patient was seen by primary care 2 days ago for complaints of fatigue and barely getting out of bed but was not hypotensive at that time.  The patient was admitted to this hospital about 6 weeks ago for a chest pain rule out and had a low risk coronary CTA and negative EKGs and troponins.  The patient had an echocardiogram earlier this year that indicated a normal ejection fraction and no major valvular abnormalities or pericardial effusions.  The patient has gait dysfunction from a prior stroke at baseline and is visited at home by PT OT.    The differential diagnosis includes dehydration, medication side effect, sepsis, and anaphylaxis, among others.    The patient's symptoms are likely multifactorial.  The patient is probably experiencing some depression after disabling stroke.  In addition he has polypharmacy and medical comorbidity issues that probably accumulate to increase his frailty.  Additionally he would probably benefit from increased contact with physical therapy and or placement.  Discussed with family that some of these issues are not amenable to resolution in the emergency department and they will need to work with their family doctor for psychiatry referral, orders for PT OT, and family/patient conversation with PCP for orders for any kind of placement that he may want/need/qualify for with his insurance.  Had a discussion with patient and family regarding quality of life versus risk factor mitigation regarding home medications.  He may be able to pare down the number of medications that he takes should he be more interested in short-term outcomes in order to improve his quality of life.  From a hypotension standpoint I suspect polypharmacy but will evaluate for other potential medical conditions.  The patient's blood pressure responded rapidly to an initial fluid bolus.    On final reevaluation the patient is in stable condition.  He is sitting  up at bedside drinking water and is more attentive and conversive.  Blood pressure has been stable and normalized.  Patient is requesting nicotine patches.  We discussed discharge instructions and need for follow-up.  We discussed reasons for early return to the emergency department.        Problems Addressed:  At risk for polypharmacy: complicated acute illness or injury  Depression, unspecified depression type: complicated acute illness or injury  Hypotension due to drugs: complicated acute illness or injury  Physical debility: complicated acute illness or injury    Amount and/or Complexity of Data Reviewed  Independent Historian: spouse     Details: The patient's significant other provided additional historical details.  Labs: ordered. Decision-making details documented in ED Course.  Radiology: ordered.  ECG/medicine tests: ordered and independent interpretation performed.     Details: EKG interpretation: Interpreted myself.  Normal sinus rhythm.  Rate 81.  Normal P wave and KS interval.  Normal QRS and axis.  Normal QTc interval.  ST segments are normal.  Slight artifact present.    Risk  OTC drugs.  Prescription drug management.  Decision regarding hospitalization.        Final diagnoses:   At risk for polypharmacy   Depression, unspecified depression type   Physical debility   Hypotension due to drugs       ED Disposition  ED Disposition       ED Disposition   Discharge    Condition   Stable    Comment   --               Guanako Atkins MD  [address] [place] [id number] [phone]    Call today  To make an appointment to be seen in the office to discuss additional home health resources and to pare down unnecessary home medications if possible to improve quality of life and decrease side effects.  Please also discuss a referral for treatment of depression.    [facility] EMERGENCY DEPARTMENT  [address] [place] [id number] [phone]    As needed,  If symptoms worsen at any time         Medication List        New Prescriptions      nicotine 21 MG/24HR patch  Commonly known as: NICODERM CQ  Place 1 patch on the skin as directed by provider Daily.            Stop      isosorbide mononitrate 60 MG 24 hr tablet  Commonly known as: IMDUR               Where to Get Your Medications        These medications were sent to [facility][id number] - [place], [place] - [facility] [facility] [address] [facility] [address] - [phone]  - [phone] [place] [address] [address] [facility] [address]      Phone: [phone]   nicotine 21 MG/24HR patch            Mg Grimaldo DO  08/24/23 5580

## 2023-08-27 LAB
QT INTERVAL: 364 MS
QTC INTERVAL: 422 MS

## 2023-08-29 LAB — BACTERIA SPEC AEROBE CULT: NORMAL

## (undated) DEVICE — SINGLE-USE BIOPSY FORCEPS: Brand: RADIAL JAW 4

## (undated) DEVICE — CANN SMPL SOFTECH BIFLO ETCO2 A/M 7FT